# Patient Record
Sex: FEMALE | Race: BLACK OR AFRICAN AMERICAN | NOT HISPANIC OR LATINO | Employment: STUDENT | URBAN - METROPOLITAN AREA
[De-identification: names, ages, dates, MRNs, and addresses within clinical notes are randomized per-mention and may not be internally consistent; named-entity substitution may affect disease eponyms.]

---
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
## 2025-01-09 ENCOUNTER — LAB (OUTPATIENT)
Dept: LAB | Facility: HOSPITAL | Age: 20
End: 2025-01-09
Payer: COMMERCIAL

## 2025-01-09 ENCOUNTER — TRANSCRIBE ORDERS (OUTPATIENT)
Dept: ADMINISTRATIVE | Facility: HOSPITAL | Age: 20
End: 2025-01-09
Payer: COMMERCIAL

## 2025-01-09 DIAGNOSIS — R11.2 NAUSEA AND VOMITING, UNSPECIFIED VOMITING TYPE: Primary | ICD-10-CM

## 2025-01-09 DIAGNOSIS — E55.9 AVITAMINOSIS D: ICD-10-CM

## 2025-01-09 DIAGNOSIS — R11.2 NAUSEA AND VOMITING, UNSPECIFIED VOMITING TYPE: ICD-10-CM

## 2025-01-09 LAB
25(OH)D3 SERPL-MCNC: 43 NG/ML (ref 30–100)
ALBUMIN SERPL-MCNC: 4.9 G/DL (ref 3.5–5.2)
ALBUMIN/GLOB SERPL: 1.5 G/DL
ALP SERPL-CCNC: 83 U/L (ref 39–117)
ALT SERPL W P-5'-P-CCNC: 31 U/L (ref 1–33)
AMYLASE SERPL-CCNC: 85 U/L (ref 28–100)
ANION GAP SERPL CALCULATED.3IONS-SCNC: 13.3 MMOL/L (ref 5–15)
AST SERPL-CCNC: 26 U/L (ref 1–32)
B PARAPERT DNA SPEC QL NAA+PROBE: NOT DETECTED
B PERT DNA SPEC QL NAA+PROBE: NOT DETECTED
BASOPHILS # BLD AUTO: 0.01 10*3/MM3 (ref 0–0.2)
BASOPHILS NFR BLD AUTO: 0.1 % (ref 0–1.5)
BILIRUB SERPL-MCNC: 0.5 MG/DL (ref 0–1.2)
BUN SERPL-MCNC: 7 MG/DL (ref 6–20)
BUN/CREAT SERPL: 10.8 (ref 7–25)
C PNEUM DNA NPH QL NAA+NON-PROBE: NOT DETECTED
CALCIUM SPEC-SCNC: 9.9 MG/DL (ref 8.6–10.5)
CHLORIDE SERPL-SCNC: 103 MMOL/L (ref 98–107)
CO2 SERPL-SCNC: 22.7 MMOL/L (ref 22–29)
CREAT SERPL-MCNC: 0.65 MG/DL (ref 0.57–1)
DEPRECATED RDW RBC AUTO: 38 FL (ref 37–54)
EGFRCR SERPLBLD CKD-EPI 2021: 130.3 ML/MIN/1.73
EOSINOPHIL # BLD AUTO: 0 10*3/MM3 (ref 0–0.4)
EOSINOPHIL NFR BLD AUTO: 0 % (ref 0.3–6.2)
ERYTHROCYTE [DISTWIDTH] IN BLOOD BY AUTOMATED COUNT: 12.4 % (ref 12.3–15.4)
FLUAV SUBTYP SPEC NAA+PROBE: NOT DETECTED
FLUBV RNA ISLT QL NAA+PROBE: NOT DETECTED
GLOBULIN UR ELPH-MCNC: 3.2 GM/DL
GLUCOSE SERPL-MCNC: 107 MG/DL (ref 65–99)
HADV DNA SPEC NAA+PROBE: NOT DETECTED
HCOV 229E RNA SPEC QL NAA+PROBE: NOT DETECTED
HCOV HKU1 RNA SPEC QL NAA+PROBE: NOT DETECTED
HCOV NL63 RNA SPEC QL NAA+PROBE: NOT DETECTED
HCOV OC43 RNA SPEC QL NAA+PROBE: NOT DETECTED
HCT VFR BLD AUTO: 39.7 % (ref 34–46.6)
HGB BLD-MCNC: 13.7 G/DL (ref 12–15.9)
HMPV RNA NPH QL NAA+NON-PROBE: NOT DETECTED
HPIV1 RNA ISLT QL NAA+PROBE: NOT DETECTED
HPIV2 RNA SPEC QL NAA+PROBE: NOT DETECTED
HPIV3 RNA NPH QL NAA+PROBE: NOT DETECTED
HPIV4 P GENE NPH QL NAA+PROBE: NOT DETECTED
IMM GRANULOCYTES # BLD AUTO: 0.04 10*3/MM3 (ref 0–0.05)
IMM GRANULOCYTES NFR BLD AUTO: 0.4 % (ref 0–0.5)
LIPASE SERPL-CCNC: 24 U/L (ref 13–60)
LYMPHOCYTES # BLD AUTO: 0.76 10*3/MM3 (ref 0.7–3.1)
LYMPHOCYTES NFR BLD AUTO: 7.6 % (ref 19.6–45.3)
M PNEUMO IGG SER IA-ACNC: NOT DETECTED
MCH RBC QN AUTO: 29.2 PG (ref 26.6–33)
MCHC RBC AUTO-ENTMCNC: 34.5 G/DL (ref 31.5–35.7)
MCV RBC AUTO: 84.6 FL (ref 79–97)
MONOCYTES # BLD AUTO: 0.11 10*3/MM3 (ref 0.1–0.9)
MONOCYTES NFR BLD AUTO: 1.1 % (ref 5–12)
NEUTROPHILS NFR BLD AUTO: 9.07 10*3/MM3 (ref 1.7–7)
NEUTROPHILS NFR BLD AUTO: 90.8 % (ref 42.7–76)
NRBC BLD AUTO-RTO: 0 /100 WBC (ref 0–0.2)
PLATELET # BLD AUTO: 330 10*3/MM3 (ref 140–450)
PMV BLD AUTO: 10.7 FL (ref 6–12)
POTASSIUM SERPL-SCNC: 3.4 MMOL/L (ref 3.5–5.2)
PROT SERPL-MCNC: 8.1 G/DL (ref 6–8.5)
RBC # BLD AUTO: 4.69 10*6/MM3 (ref 3.77–5.28)
RHINOVIRUS RNA SPEC NAA+PROBE: DETECTED
RSV RNA NPH QL NAA+NON-PROBE: NOT DETECTED
SARS-COV-2 RNA RESP QL NAA+PROBE: NOT DETECTED
SODIUM SERPL-SCNC: 139 MMOL/L (ref 136–145)
WBC NRBC COR # BLD AUTO: 9.99 10*3/MM3 (ref 3.4–10.8)

## 2025-01-09 PROCEDURE — 82150 ASSAY OF AMYLASE: CPT

## 2025-01-09 PROCEDURE — 80053 COMPREHEN METABOLIC PANEL: CPT

## 2025-01-09 PROCEDURE — 85025 COMPLETE CBC W/AUTO DIFF WBC: CPT

## 2025-01-09 PROCEDURE — 87040 BLOOD CULTURE FOR BACTERIA: CPT

## 2025-01-09 PROCEDURE — 36415 COLL VENOUS BLD VENIPUNCTURE: CPT

## 2025-01-09 PROCEDURE — 83690 ASSAY OF LIPASE: CPT

## 2025-01-09 PROCEDURE — 82306 VITAMIN D 25 HYDROXY: CPT

## 2025-01-09 PROCEDURE — 0202U NFCT DS 22 TRGT SARS-COV-2: CPT

## 2025-01-14 LAB
BACTERIA SPEC AEROBE CULT: NORMAL
BACTERIA SPEC AEROBE CULT: NORMAL

## 2025-02-14 ENCOUNTER — HOSPITAL ENCOUNTER (EMERGENCY)
Facility: HOSPITAL | Age: 20
Discharge: HOME | End: 2025-02-14
Attending: EMERGENCY MEDICINE
Payer: COMMERCIAL

## 2025-02-14 ENCOUNTER — APPOINTMENT (OUTPATIENT)
Dept: GENERAL RADIOLOGY | Facility: HOSPITAL | Age: 20
End: 2025-02-14
Payer: COMMERCIAL

## 2025-02-14 VITALS
HEART RATE: 83 BPM | RESPIRATION RATE: 17 BRPM | OXYGEN SATURATION: 99 % | BODY MASS INDEX: 21.96 KG/M2 | TEMPERATURE: 99.7 F | DIASTOLIC BLOOD PRESSURE: 63 MMHG | HEIGHT: 67 IN | SYSTOLIC BLOOD PRESSURE: 105 MMHG

## 2025-02-14 DIAGNOSIS — J45.901 ASTHMA WITH ACUTE EXACERBATION, UNSPECIFIED ASTHMA SEVERITY, UNSPECIFIED WHETHER PERSISTENT: Primary | ICD-10-CM

## 2025-02-14 DIAGNOSIS — R00.2 PALPITATIONS: ICD-10-CM

## 2025-02-14 LAB
ALBUMIN SERPL-MCNC: 4.8 G/DL (ref 3.5–5.2)
ALBUMIN/GLOB SERPL: 1.5 G/DL
ALP SERPL-CCNC: 61 U/L (ref 39–117)
ALT SERPL W P-5'-P-CCNC: 16 U/L (ref 1–33)
ANION GAP SERPL CALCULATED.3IONS-SCNC: 11 MMOL/L (ref 5–15)
AST SERPL-CCNC: 29 U/L (ref 1–32)
BASOPHILS # BLD AUTO: 0.03 10*3/MM3 (ref 0–0.2)
BASOPHILS NFR BLD AUTO: 0.3 % (ref 0–1.5)
BILIRUB SERPL-MCNC: 0.8 MG/DL (ref 0–1.2)
BUN SERPL-MCNC: 9 MG/DL (ref 6–20)
BUN/CREAT SERPL: 13 (ref 7–25)
CALCIUM SPEC-SCNC: 9.6 MG/DL (ref 8.6–10.5)
CHLORIDE SERPL-SCNC: 102 MMOL/L (ref 98–107)
CO2 SERPL-SCNC: 24 MMOL/L (ref 22–29)
CREAT SERPL-MCNC: 0.69 MG/DL (ref 0.57–1)
D DIMER PPP FEU-MCNC: <0.27 MCGFEU/ML (ref 0–0.5)
DEPRECATED RDW RBC AUTO: 37.5 FL (ref 37–54)
EGFRCR SERPLBLD CKD-EPI 2021: 128.4 ML/MIN/1.73
EOSINOPHIL # BLD AUTO: 0.16 10*3/MM3 (ref 0–0.4)
EOSINOPHIL NFR BLD AUTO: 1.7 % (ref 0.3–6.2)
ERYTHROCYTE [DISTWIDTH] IN BLOOD BY AUTOMATED COUNT: 12.3 % (ref 12.3–15.4)
GEN 5 1HR TROPONIN T REFLEX: 8 NG/L
GLOBULIN UR ELPH-MCNC: 3.1 GM/DL
GLUCOSE SERPL-MCNC: 109 MG/DL (ref 65–99)
HCT VFR BLD AUTO: 40.1 % (ref 34–46.6)
HGB BLD-MCNC: 13.7 G/DL (ref 12–15.9)
HOLD SPECIMEN: NORMAL
IMM GRANULOCYTES # BLD AUTO: 0.03 10*3/MM3 (ref 0–0.05)
IMM GRANULOCYTES NFR BLD AUTO: 0.3 % (ref 0–0.5)
LYMPHOCYTES # BLD AUTO: 1.39 10*3/MM3 (ref 0.7–3.1)
LYMPHOCYTES NFR BLD AUTO: 14.4 % (ref 19.6–45.3)
MCH RBC QN AUTO: 28.4 PG (ref 26.6–33)
MCHC RBC AUTO-ENTMCNC: 34.2 G/DL (ref 31.5–35.7)
MCV RBC AUTO: 83.2 FL (ref 79–97)
MONOCYTES # BLD AUTO: 0.61 10*3/MM3 (ref 0.1–0.9)
MONOCYTES NFR BLD AUTO: 6.3 % (ref 5–12)
NEUTROPHILS NFR BLD AUTO: 7.46 10*3/MM3 (ref 1.7–7)
NEUTROPHILS NFR BLD AUTO: 77 % (ref 42.7–76)
NRBC BLD AUTO-RTO: 0 /100 WBC (ref 0–0.2)
PLATELET # BLD AUTO: 307 10*3/MM3 (ref 140–450)
PMV BLD AUTO: 10.6 FL (ref 6–12)
POTASSIUM SERPL-SCNC: 3.6 MMOL/L (ref 3.5–5.2)
PROT SERPL-MCNC: 7.9 G/DL (ref 6–8.5)
RBC # BLD AUTO: 4.82 10*6/MM3 (ref 3.77–5.28)
SODIUM SERPL-SCNC: 137 MMOL/L (ref 136–145)
TROPONIN T NUMERIC DELTA: NORMAL
TROPONIN T SERPL HS-MCNC: <6 NG/L
WBC NRBC COR # BLD AUTO: 9.68 10*3/MM3 (ref 3.4–10.8)

## 2025-02-14 PROCEDURE — 85025 COMPLETE CBC W/AUTO DIFF WBC: CPT | Performed by: EMERGENCY MEDICINE

## 2025-02-14 PROCEDURE — 71045 X-RAY EXAM CHEST 1 VIEW: CPT

## 2025-02-14 PROCEDURE — 99284 EMERGENCY DEPT VISIT MOD MDM: CPT

## 2025-02-14 PROCEDURE — 80053 COMPREHEN METABOLIC PANEL: CPT | Performed by: EMERGENCY MEDICINE

## 2025-02-14 PROCEDURE — 85379 FIBRIN DEGRADATION QUANT: CPT | Performed by: EMERGENCY MEDICINE

## 2025-02-14 PROCEDURE — 36415 COLL VENOUS BLD VENIPUNCTURE: CPT

## 2025-02-14 PROCEDURE — 84484 ASSAY OF TROPONIN QUANT: CPT | Performed by: EMERGENCY MEDICINE

## 2025-02-14 PROCEDURE — 93005 ELECTROCARDIOGRAM TRACING: CPT | Performed by: EMERGENCY MEDICINE

## 2025-02-14 RX ORDER — SODIUM CHLORIDE 0.9 % (FLUSH) 0.9 %
10 SYRINGE (ML) INJECTION AS NEEDED
Status: DISCONTINUED | OUTPATIENT
Start: 2025-02-14 | End: 2025-02-14 | Stop reason: HOSPADM

## 2025-02-14 NOTE — ED PROVIDER NOTES
Subjective   History of Present Illness  Patient is a 20y/o female with a history of chronic asthma who presents with a flare-up experienced this morning. While at work, she became lightheaded and noted her heart rate was 150 beats per minute and her oxygen saturation was 87%. She was moved to a different room and experienced a 20-minute coughing fit, during which she felt like she was choking. Subsequently, the right side of her head started tingling, and she had difficulty catching her breath. She used her rescue inhaler, which initially helped, but during a second flare-up, she experienced burning in the lower part of her chest, was hunched over, and sweating. She reports feeling out of breath and having episodes of tachycardia. She states she is seeing a specialist as she has been experiencing breathlessness and tachycardia for a period of 2-6 weeks.    Past Medical History: chronic asthma.    Medications: Rescue inhaler.  Review of Systems  See hpi  Past Medical History:   Diagnosis Date    Allergies     Asthma        Not on File    Past Surgical History:   Procedure Laterality Date    LEG TENDON REPAIR Right 01/24/2022    Procedure: TENDON REPAIR FOOT/TOE -posterior tibial tendon repair, Tarsal Tunnel decompression, biopsy of soft tissue mass;  Surgeon: DAMION Hammer DPM;  Location: [facility];  Service: Podiatry;  Laterality: Right;    LYMPH NODE BIOPSY Right     neck       Family History   Problem Relation Age of Onset    No Known Problems Mother     No Known Problems Father     Diabetes Maternal Grandmother     Cancer Maternal Grandmother     Hypertension Maternal Grandmother     Heart disease Maternal Grandfather     Heart attack Maternal Grandfather     Hypertension Maternal Grandfather        Social History     Socioeconomic History    Marital status: Single   Tobacco Use    Smoking status: Never    Smokeless tobacco: Never   Vaping Use    Vaping status: Never Used   Substance and Sexual Activity  "   Alcohol use: Never    Drug use: Never    Sexual activity: Not Currently           Objective   Physical Exam  No acute distress. Normocephalic. No scleral icterus. Moist oral mucosa. No observable neck masses on external visualization. Slight bilateral expiratory wheezes. Tachycardic rate and irregular rhythm. No lower extremity edema. No murmur appreciated. Intact distal pulses. Abdomen soft and nontender without peritoneal signs. Alert. Normal Speech.  Procedures           ED Course      /63   Pulse 83   Temp 99.7 °F (37.6 °C)   Resp 17   Ht 170.2 cm (67\")   SpO2 99%   BMI 21.96 kg/m²   Labs Reviewed   COMPREHENSIVE METABOLIC PANEL - Abnormal; Notable for the following components:       Result Value    Glucose 109 (*)     All other components within normal limits    Narrative:     GFR Categories in Chronic Kidney Disease (CKD)      GFR Category          GFR (mL/min/1.73)    Interpretation  G1                     90 or greater         Normal or high (1)  G2                      60-89                Mild decrease (1)  G3a                   45-59                Mild to moderate decrease  G3b                   30-44                Moderate to severe decrease  G4                    15-29                Severe decrease  G5                    14 or less           Kidney failure          (1)In the absence of evidence of kidney disease, neither GFR category G1 or G2 fulfill the criteria for CKD.    eGFR calculation 2021 CKD-EPI creatinine equation, which does not include race as a factor   CBC WITH AUTO DIFFERENTIAL - Abnormal; Notable for the following components:    Neutrophil % 77.0 (*)     Lymphocyte % 14.4 (*)     Neutrophils, Absolute 7.46 (*)     All other components within normal limits   TROPONIN - Normal    Narrative:     High Sensitive Troponin T Reference Range:  <14.0 ng/L- Negative Female for AMI  <22.0 ng/L- Negative Male for AMI  >=14 - Abnormal Female indicating possible myocardial " "injury.  >=22 - Abnormal Male indicating possible myocardial injury.   Clinicians would have to utilize clinical acumen, EKG, Troponin, and serial changes to determine if it is an Acute Myocardial Infarction or myocardial injury due to an underlying chronic condition.        D-DIMER, QUANTITATIVE - Normal    Narrative:     According to the assay 's published package insert, a normal (<0.50 MCGFEU/mL) D-dimer result in conjunction with a non-high clinical probability assessment, excludes deep vein thrombosis (DVT) and pulmonary embolism (PE) with high sensitivity.    D-dimer values increase with age and this can make VTE exclusion of an older population difficult. To address this, the [employer], based on best available evidence and recent guidelines, recommends that clinicians use age-adjusted D-dimer thresholds in patients greater than 50 years of age with: a) a low probability of PE who do not meet all Pulmonary Embolism Rule Out Criteria, or b) in those with intermediate probability of PE.   The formula for an age-adjusted D-dimer cut-off is \"age/100\".  For example, a 60 year old patient would have an age-adjusted cut-off of 0.60 MCGFEU/mL and an 80 year old 0.80 MCGFEU/mL.   HIGH SENSITIVITIY TROPONIN T 1HR    Narrative:     High Sensitive Troponin T Reference Range:  <14.0 ng/L- Negative Female for AMI  <22.0 ng/L- Negative Male for AMI  >=14 - Abnormal Female indicating possible myocardial injury.  >=22 - Abnormal Male indicating possible myocardial injury.   Clinicians would have to utilize clinical acumen, EKG, Troponin, and serial changes to determine if it is an Acute Myocardial Infarction or myocardial injury due to an underlying chronic condition.        CBC AND DIFFERENTIAL    Narrative:     The following orders were created for panel order CBC & Differential.  Procedure                               Abnormality         Status                     ---------                 "               -----------         ------                     CBC Auto Differential[[id number]]        Abnormal            Final result                 Please view results for these tests on the individual orders.   EXTRA TUBES    Narrative:     The following orders were created for panel order Extra Tubes.  Procedure                               Abnormality         Status                     ---------                               -----------         ------                     Gold Top - SST[[id number]]                                   Final result                 Please view results for these tests on the individual orders.   GOLD TOP - SST     Medications - No data to display  XR Chest 1 View   Final Result   Impression:   No acute cardiopulmonary findings.         Electronically Signed: Shirin Murray MD     2/14/2025 1:55 PM EST     Workstation ID: [id number]                                                         Medical Decision Making  Problems Addressed:  Asthma with acute exacerbation, unspecified asthma severity, unspecified whether persistent: complicated acute illness or injury  Palpitations: complicated acute illness or injury    Amount and/or Complexity of Data Reviewed  Labs: ordered.  Radiology: ordered.  ECG/medicine tests: ordered.    Risk  Prescription drug management.      EKG interpretation: 1335, rate 94, normal sinus rhythm, normal axis, normals, no acute ischemic changes.    Interpretation chest x-ray is no focal infiltrate or pneumothorax.  See system for radiology interpretation.    Troponin negative.  Dimer negative.  Reassuring labs and vital signs.  Patient nontoxic-appearing.  Wheezing resolved after breathing treatment.  Will DC.  Return ER precautions discussed.  Final diagnoses:   Asthma with acute exacerbation, unspecified asthma severity, unspecified whether persistent   Palpitations       ED Disposition  ED Disposition       ED Disposition   Discharge    Condition   Stable     Comment   --               Lola Handley, BELLA  [address] [place] [address]  [phone]    In 3 days           Medication List      No changes were made to your prescriptions during this visit.            Jarvis Deal MD  02/15/25 0000

## 2025-02-15 LAB
QT INTERVAL: 339 MS
QTC INTERVAL: 424 MS

## 2025-02-20 ENCOUNTER — HOSPITAL ENCOUNTER (EMERGENCY)
Facility: HOSPITAL | Age: 20
Discharge: HOME | End: 2025-02-20
Attending: EMERGENCY MEDICINE
Payer: COMMERCIAL

## 2025-02-20 ENCOUNTER — APPOINTMENT (OUTPATIENT)
Dept: GENERAL RADIOLOGY | Facility: HOSPITAL | Age: 20
End: 2025-02-20
Payer: COMMERCIAL

## 2025-02-20 VITALS
TEMPERATURE: 98.3 F | BODY MASS INDEX: 22.15 KG/M2 | HEIGHT: 67 IN | RESPIRATION RATE: 18 BRPM | OXYGEN SATURATION: 99 % | WEIGHT: 141.09 LBS | HEART RATE: 107 BPM | DIASTOLIC BLOOD PRESSURE: 69 MMHG | SYSTOLIC BLOOD PRESSURE: 105 MMHG

## 2025-02-20 DIAGNOSIS — J45.901 EXACERBATION OF ASTHMA, UNSPECIFIED ASTHMA SEVERITY, UNSPECIFIED WHETHER PERSISTENT: Primary | ICD-10-CM

## 2025-02-20 LAB
ANION GAP SERPL CALCULATED.3IONS-SCNC: 10.1 MMOL/L (ref 5–15)
B PARAPERT DNA SPEC QL NAA+PROBE: NOT DETECTED
B PERT DNA SPEC QL NAA+PROBE: NOT DETECTED
BASOPHILS # BLD AUTO: 0.05 10*3/MM3 (ref 0–0.2)
BASOPHILS NFR BLD AUTO: 0.5 % (ref 0–1.5)
BUN SERPL-MCNC: 6 MG/DL (ref 6–20)
BUN/CREAT SERPL: 9.8 (ref 7–25)
C PNEUM DNA NPH QL NAA+NON-PROBE: NOT DETECTED
CALCIUM SPEC-SCNC: 9.9 MG/DL (ref 8.6–10.5)
CHLORIDE SERPL-SCNC: 104 MMOL/L (ref 98–107)
CO2 SERPL-SCNC: 22.9 MMOL/L (ref 22–29)
CREAT SERPL-MCNC: 0.61 MG/DL (ref 0.57–1)
DEPRECATED RDW RBC AUTO: 37.3 FL (ref 37–54)
EGFRCR SERPLBLD CKD-EPI 2021: 132.3 ML/MIN/1.73
EOSINOPHIL # BLD AUTO: 0.05 10*3/MM3 (ref 0–0.4)
EOSINOPHIL NFR BLD AUTO: 0.5 % (ref 0.3–6.2)
ERYTHROCYTE [DISTWIDTH] IN BLOOD BY AUTOMATED COUNT: 12.5 % (ref 12.3–15.4)
FLUAV SUBTYP SPEC NAA+PROBE: NOT DETECTED
FLUBV RNA ISLT QL NAA+PROBE: NOT DETECTED
GLUCOSE SERPL-MCNC: 113 MG/DL (ref 65–99)
HADV DNA SPEC NAA+PROBE: NOT DETECTED
HCOV 229E RNA SPEC QL NAA+PROBE: NOT DETECTED
HCOV HKU1 RNA SPEC QL NAA+PROBE: NOT DETECTED
HCOV NL63 RNA SPEC QL NAA+PROBE: NOT DETECTED
HCOV OC43 RNA SPEC QL NAA+PROBE: NOT DETECTED
HCT VFR BLD AUTO: 37.5 % (ref 34–46.6)
HGB BLD-MCNC: 12.8 G/DL (ref 12–15.9)
HMPV RNA NPH QL NAA+NON-PROBE: NOT DETECTED
HOLD SPECIMEN: NORMAL
HPIV1 RNA ISLT QL NAA+PROBE: NOT DETECTED
HPIV2 RNA SPEC QL NAA+PROBE: NOT DETECTED
HPIV3 RNA NPH QL NAA+PROBE: NOT DETECTED
HPIV4 P GENE NPH QL NAA+PROBE: NOT DETECTED
IMM GRANULOCYTES # BLD AUTO: 0.04 10*3/MM3 (ref 0–0.05)
IMM GRANULOCYTES NFR BLD AUTO: 0.4 % (ref 0–0.5)
LYMPHOCYTES # BLD AUTO: 1.19 10*3/MM3 (ref 0.7–3.1)
LYMPHOCYTES NFR BLD AUTO: 12.7 % (ref 19.6–45.3)
M PNEUMO IGG SER IA-ACNC: NOT DETECTED
MCH RBC QN AUTO: 28.1 PG (ref 26.6–33)
MCHC RBC AUTO-ENTMCNC: 34.1 G/DL (ref 31.5–35.7)
MCV RBC AUTO: 82.2 FL (ref 79–97)
MONOCYTES # BLD AUTO: 0.29 10*3/MM3 (ref 0.1–0.9)
MONOCYTES NFR BLD AUTO: 3.1 % (ref 5–12)
NEUTROPHILS NFR BLD AUTO: 7.75 10*3/MM3 (ref 1.7–7)
NEUTROPHILS NFR BLD AUTO: 82.8 % (ref 42.7–76)
NRBC BLD AUTO-RTO: 0 /100 WBC (ref 0–0.2)
PLATELET # BLD AUTO: 314 10*3/MM3 (ref 140–450)
PMV BLD AUTO: 10.5 FL (ref 6–12)
POTASSIUM SERPL-SCNC: 3.6 MMOL/L (ref 3.5–5.2)
RBC # BLD AUTO: 4.56 10*6/MM3 (ref 3.77–5.28)
RHINOVIRUS RNA SPEC NAA+PROBE: NOT DETECTED
RSV RNA NPH QL NAA+NON-PROBE: NOT DETECTED
SARS-COV-2 RNA RESP QL NAA+PROBE: NOT DETECTED
SODIUM SERPL-SCNC: 137 MMOL/L (ref 136–145)
WBC NRBC COR # BLD AUTO: 9.37 10*3/MM3 (ref 3.4–10.8)
WHOLE BLOOD HOLD COAG: NORMAL

## 2025-02-20 PROCEDURE — 80048 BASIC METABOLIC PNL TOTAL CA: CPT | Performed by: EMERGENCY MEDICINE

## 2025-02-20 PROCEDURE — 94799 UNLISTED PULMONARY SVC/PX: CPT

## 2025-02-20 PROCEDURE — 94640 AIRWAY INHALATION TREATMENT: CPT

## 2025-02-20 PROCEDURE — 71045 X-RAY EXAM CHEST 1 VIEW: CPT

## 2025-02-20 PROCEDURE — 96366 THER/PROPH/DIAG IV INF ADDON: CPT

## 2025-02-20 PROCEDURE — 99284 EMERGENCY DEPT VISIT MOD MDM: CPT

## 2025-02-20 PROCEDURE — 85025 COMPLETE CBC W/AUTO DIFF WBC: CPT | Performed by: EMERGENCY MEDICINE

## 2025-02-20 PROCEDURE — 96365 THER/PROPH/DIAG IV INF INIT: CPT

## 2025-02-20 PROCEDURE — 25010000002 MAGNESIUM SULFATE 2 GM/50ML SOLUTION: Performed by: EMERGENCY MEDICINE

## 2025-02-20 PROCEDURE — 0202U NFCT DS 22 TRGT SARS-COV-2: CPT | Performed by: EMERGENCY MEDICINE

## 2025-02-20 PROCEDURE — 93005 ELECTROCARDIOGRAM TRACING: CPT | Performed by: EMERGENCY MEDICINE

## 2025-02-20 PROCEDURE — 94761 N-INVAS EAR/PLS OXIMETRY MLT: CPT

## 2025-02-20 RX ORDER — ALBUTEROL SULFATE 0.83 MG/ML
2.5 SOLUTION RESPIRATORY (INHALATION) ONCE
Status: COMPLETED | OUTPATIENT
Start: 2025-02-20 | End: 2025-02-20

## 2025-02-20 RX ORDER — SODIUM CHLORIDE 0.9 % (FLUSH) 0.9 %
10 SYRINGE (ML) INJECTION AS NEEDED
Status: DISCONTINUED | OUTPATIENT
Start: 2025-02-20 | End: 2025-02-20 | Stop reason: HOSPADM

## 2025-02-20 RX ORDER — MAGNESIUM SULFATE HEPTAHYDRATE 40 MG/ML
2 INJECTION, SOLUTION INTRAVENOUS ONCE
Status: COMPLETED | OUTPATIENT
Start: 2025-02-20 | End: 2025-02-20

## 2025-02-20 RX ADMIN — ALBUTEROL SULFATE 2.5 MG: 2.5 SOLUTION RESPIRATORY (INHALATION) at 17:12

## 2025-02-20 RX ADMIN — MAGNESIUM SULFATE IN WATER FOR 2 G: 40 INJECTION INTRAVENOUS at 16:43

## 2025-02-20 NOTE — ED PROVIDER NOTES
Subjective   History of Present Illness  Chief complaint: Shortness of breath    19-year-old female with history of asthma presents with shortness of breath.  Patient states symptoms have been present over the past 1 to 2 weeks.  She was at her primary doctor's office today and could not catch her breath.  She was given Solu-Medrol and a DuoNeb treatment in the office.  EMS also gave an albuterol treatment en route to the hospital.  She is oxygenating well on 2 L but still feels short of breath and is having cough and bronchospasm.  She denies fever.  She states she has been sweating and has been tachycardic.    History provided by:  Patient      Review of Systems   Constitutional:  Positive for diaphoresis. Negative for fever.   HENT:  Negative for congestion.    Respiratory:  Positive for cough and shortness of breath.    Cardiovascular:  Negative for chest pain.   Gastrointestinal:  Negative for abdominal pain and vomiting.   Neurological:  Negative for headaches.       Past Medical History:   Diagnosis Date    Allergies     Asthma        No Known Allergies    Past Surgical History:   Procedure Laterality Date    LEG TENDON REPAIR Right 01/24/2022    Procedure: TENDON REPAIR FOOT/TOE -posterior tibial tendon repair, Tarsal Tunnel decompression, biopsy of soft tissue mass;  Surgeon: DAMION Hammer DPM;  Location: [facility] OR;  Service: Podiatry;  Laterality: Right;    LYMPH NODE BIOPSY Right     neck       Family History   Problem Relation Age of Onset    No Known Problems Mother     No Known Problems Father     Diabetes Maternal Grandmother     Cancer Maternal Grandmother     Hypertension Maternal Grandmother     Heart disease Maternal Grandfather     Heart attack Maternal Grandfather     Hypertension Maternal Grandfather        Social History     Socioeconomic History    Marital status: Single   Tobacco Use    Smoking status: Never    Smokeless tobacco: Never   Vaping Use    Vaping status: Never Used  "  Substance and Sexual Activity    Alcohol use: Never    Drug use: Never    Sexual activity: Not Currently       /68   Pulse 101   Temp 98.3 °F (36.8 °C) (Oral)   Resp 18   Ht 170 cm (66.93\")   Wt 64 kg (141 lb 1.5 oz)   SpO2 96%   BMI 22.15 kg/m²       Objective   Physical Exam  Vitals and nursing note reviewed.   Constitutional:       Appearance: Normal appearance.   HENT:      Head: Normocephalic and atraumatic.      Mouth/Throat:      Mouth: Mucous membranes are moist.   Cardiovascular:      Rate and Rhythm: Regular rhythm. Tachycardia present.      Heart sounds: Normal heart sounds.   Pulmonary:      Effort: Accessory muscle usage present.      Breath sounds: Decreased breath sounds and wheezing present.   Abdominal:      Palpations: Abdomen is soft.      Tenderness: There is no abdominal tenderness.   Musculoskeletal:      Right lower leg: No edema.      Left lower leg: No edema.   Skin:     General: Skin is warm and dry.   Neurological:      Mental Status: She is alert and oriented to person, place, and time.         Procedures           ED Course      My interpretation of EKG shows sinus rhythm, rate of 95, no ST elevation, artifact present                                     Results for orders placed or performed during the hospital encounter of 02/20/25   ECG 12 Lead Dyspnea    Collection Time: 02/20/25  4:38 PM   Result Value Ref Range    QT Interval 343 ms    QTC Interval 432 ms   Respiratory Panel PCR w/COVID-19(SARS-CoV-2) SANTOSH/YUSEF/NEGRO/PAD/COR/GERDA In-House, NP Swab in [facility], 2 HR TAT - Swab, Nasopharynx    Collection Time: 02/20/25  4:40 PM    Specimen: Nasopharynx; Swab   Result Value Ref Range    ADENOVIRUS, PCR Not Detected Not Detected    Coronavirus 229E Not Detected Not Detected    Coronavirus HKU1 Not Detected Not Detected    Coronavirus NL63 Not Detected Not Detected    Coronavirus OC43 Not Detected Not Detected    COVID19 Not Detected Not Detected - Ref. Range    Human " Metapneumovirus Not Detected Not Detected    Human Rhinovirus/Enterovirus Not Detected Not Detected    Influenza A PCR Not Detected Not Detected    Influenza B PCR Not Detected Not Detected    Parainfluenza Virus 1 Not Detected Not Detected    Parainfluenza Virus 2 Not Detected Not Detected    Parainfluenza Virus 3 Not Detected Not Detected    Parainfluenza Virus 4 Not Detected Not Detected    RSV, PCR Not Detected Not Detected    Bordetella pertussis pcr Not Detected Not Detected    Bordetella parapertussis PCR Not Detected Not Detected    Chlamydophila pneumoniae PCR Not Detected Not Detected    Mycoplasma pneumo by PCR Not Detected Not Detected   Basic Metabolic Panel    Collection Time: 02/20/25  4:45 PM    Specimen: Blood   Result Value Ref Range    Glucose 113 (H) 65 - 99 mg/dL    BUN 6 6 - 20 mg/dL    Creatinine 0.61 0.57 - 1.00 mg/dL    Sodium 137 136 - 145 mmol/L    Potassium 3.6 3.5 - 5.2 mmol/L    Chloride 104 98 - 107 mmol/L    CO2 22.9 22.0 - 29.0 mmol/L    Calcium 9.9 8.6 - 10.5 mg/dL    BUN/Creatinine Ratio 9.8 7.0 - 25.0    Anion Gap 10.1 5.0 - 15.0 mmol/L    eGFR 132.3 >60.0 mL/min/1.73   CBC Auto Differential    Collection Time: 02/20/25  4:45 PM    Specimen: Blood   Result Value Ref Range    WBC 9.37 3.40 - 10.80 10*3/mm3    RBC 4.56 3.77 - 5.28 10*6/mm3    Hemoglobin 12.8 12.0 - 15.9 g/dL    Hematocrit 37.5 34.0 - 46.6 %    MCV 82.2 79.0 - 97.0 fL    MCH 28.1 26.6 - 33.0 pg    MCHC 34.1 31.5 - 35.7 g/dL    RDW 12.5 12.3 - 15.4 %    RDW-SD 37.3 37.0 - 54.0 fl    MPV 10.5 6.0 - 12.0 fL    Platelets 314 140 - 450 10*3/mm3    Neutrophil % 82.8 (H) 42.7 - 76.0 %    Lymphocyte % 12.7 (L) 19.6 - 45.3 %    Monocyte % 3.1 (L) 5.0 - 12.0 %    Eosinophil % 0.5 0.3 - 6.2 %    Basophil % 0.5 0.0 - 1.5 %    Immature Grans % 0.4 0.0 - 0.5 %    Neutrophils, Absolute 7.75 (H) 1.70 - 7.00 10*3/mm3    Lymphocytes, Absolute 1.19 0.70 - 3.10 10*3/mm3    Monocytes, Absolute 0.29 0.10 - 0.90 10*3/mm3    Eosinophils,  Absolute 0.05 0.00 - 0.40 10*3/mm3    Basophils, Absolute 0.05 0.00 - 0.20 10*3/mm3    Immature Grans, Absolute 0.04 0.00 - 0.05 10*3/mm3    nRBC 0.0 0.0 - 0.2 /100 WBC   Gold Top - SST    Collection Time: 02/20/25  4:45 PM   Result Value Ref Range    Extra Tube Hold for add-ons.    Light Blue Top    Collection Time: 02/20/25  4:45 PM   Result Value Ref Range    Extra Tube Hold for add-ons.      XR Chest 1 View    Result Date: 2/20/2025  Impression: No radiographic evidence of acute cardiopulmonary abnormality. Electronically Signed: Hugo Rivas  2/20/2025 5:12 PM EST  Workstation ID: [id number]                 Medical Decision Making  Amount and/or Complexity of Data Reviewed  Labs: ordered.  Radiology: ordered.  ECG/medicine tests: ordered.    Risk  Prescription drug management.      Patient had the above evaluation.  Results were discussed with the patient.  My interpretation of chest x-ray shows no infiltrate or effusion.  EKG shows no acute ischemia.  White blood cell count is normal.  BMP is unremarkable.  Respiratory panel is negative.  Patient was given breathing treatments and magnesium in the emergency room.  She is feeling much better.  Her cough is resolved.  She no longer has any wheezing on exam.  She is oxygenating well on room air.  She will be discharged to follow-up with her primary provider and pulmonologist.      Final diagnoses:   Exacerbation of asthma, unspecified asthma severity, unspecified whether persistent       ED Disposition  ED Disposition       ED Disposition   Discharge    Condition   Stable    Comment   --               Lola Handley NP  [address] [facility] [place] [facility]  [phone]    Call in 2 days           Medication List      No changes were made to your prescriptions during this visit.            Chidi Brothers MD  02/20/25 [id number]

## 2025-02-20 NOTE — DISCHARGE INSTRUCTIONS
Follow-up with your primary provider and pulmonologist.  Return to the emergency room for any new or worsening symptoms or if you have any other questions or concerns.

## 2025-02-21 LAB
QT INTERVAL: 343 MS
QTC INTERVAL: 432 MS

## 2025-03-24 ENCOUNTER — APPOINTMENT (OUTPATIENT)
Dept: GENERAL RADIOLOGY | Facility: HOSPITAL | Age: 20
End: 2025-03-24
Payer: COMMERCIAL

## 2025-03-24 ENCOUNTER — HOSPITAL ENCOUNTER (EMERGENCY)
Facility: HOSPITAL | Age: 20
Discharge: HOME | End: 2025-03-24
Attending: EMERGENCY MEDICINE | Admitting: EMERGENCY MEDICINE
Payer: COMMERCIAL

## 2025-03-24 VITALS
SYSTOLIC BLOOD PRESSURE: 101 MMHG | DIASTOLIC BLOOD PRESSURE: 65 MMHG | OXYGEN SATURATION: 100 % | TEMPERATURE: 98.4 F | RESPIRATION RATE: 16 BRPM | WEIGHT: 150.79 LBS | HEIGHT: 67 IN | HEART RATE: 85 BPM | BODY MASS INDEX: 23.67 KG/M2

## 2025-03-24 DIAGNOSIS — R11.10 VOMITING, UNSPECIFIED VOMITING TYPE, UNSPECIFIED WHETHER NAUSEA PRESENT: Primary | ICD-10-CM

## 2025-03-24 LAB
ALBUMIN SERPL-MCNC: 4.4 G/DL (ref 3.5–5.2)
ALBUMIN/GLOB SERPL: 1.8 G/DL
ALP SERPL-CCNC: 78 U/L (ref 39–117)
ALT SERPL W P-5'-P-CCNC: 82 U/L (ref 1–33)
ANION GAP SERPL CALCULATED.3IONS-SCNC: 11.1 MMOL/L (ref 5–15)
AST SERPL-CCNC: 48 U/L (ref 1–32)
B PARAPERT DNA SPEC QL NAA+PROBE: NOT DETECTED
B PERT DNA SPEC QL NAA+PROBE: NOT DETECTED
B-HCG UR QL: NEGATIVE
BASOPHILS # BLD AUTO: 0.03 10*3/MM3 (ref 0–0.2)
BASOPHILS NFR BLD AUTO: 0.4 % (ref 0–1.5)
BILIRUB SERPL-MCNC: 0.3 MG/DL (ref 0–1.2)
BILIRUB UR QL STRIP: NEGATIVE
BUN SERPL-MCNC: 5 MG/DL (ref 6–20)
BUN/CREAT SERPL: 7.8 (ref 7–25)
C PNEUM DNA NPH QL NAA+NON-PROBE: NOT DETECTED
CALCIUM SPEC-SCNC: 8.7 MG/DL (ref 8.6–10.5)
CHLORIDE SERPL-SCNC: 106 MMOL/L (ref 98–107)
CLARITY UR: CLEAR
CO2 SERPL-SCNC: 21.9 MMOL/L (ref 22–29)
COLOR UR: YELLOW
CREAT SERPL-MCNC: 0.64 MG/DL (ref 0.57–1)
DEPRECATED RDW RBC AUTO: 39.2 FL (ref 37–54)
EGFRCR SERPLBLD CKD-EPI 2021: 129.9 ML/MIN/1.73
EOSINOPHIL # BLD AUTO: 0.12 10*3/MM3 (ref 0–0.4)
EOSINOPHIL NFR BLD AUTO: 1.6 % (ref 0.3–6.2)
ERYTHROCYTE [DISTWIDTH] IN BLOOD BY AUTOMATED COUNT: 12.7 % (ref 12.3–15.4)
FLUAV SUBTYP SPEC NAA+PROBE: NOT DETECTED
FLUBV RNA ISLT QL NAA+PROBE: NOT DETECTED
GLOBULIN UR ELPH-MCNC: 2.5 GM/DL
GLUCOSE SERPL-MCNC: 88 MG/DL (ref 65–99)
GLUCOSE UR STRIP-MCNC: NEGATIVE MG/DL
HADV DNA SPEC NAA+PROBE: NOT DETECTED
HCOV 229E RNA SPEC QL NAA+PROBE: NOT DETECTED
HCOV HKU1 RNA SPEC QL NAA+PROBE: NOT DETECTED
HCOV NL63 RNA SPEC QL NAA+PROBE: NOT DETECTED
HCOV OC43 RNA SPEC QL NAA+PROBE: NOT DETECTED
HCT VFR BLD AUTO: 38 % (ref 34–46.6)
HGB BLD-MCNC: 12.6 G/DL (ref 12–15.9)
HGB UR QL STRIP.AUTO: NEGATIVE
HMPV RNA NPH QL NAA+NON-PROBE: NOT DETECTED
HPIV1 RNA ISLT QL NAA+PROBE: NOT DETECTED
HPIV2 RNA SPEC QL NAA+PROBE: NOT DETECTED
HPIV3 RNA NPH QL NAA+PROBE: NOT DETECTED
HPIV4 P GENE NPH QL NAA+PROBE: NOT DETECTED
IMM GRANULOCYTES # BLD AUTO: 0.03 10*3/MM3 (ref 0–0.05)
IMM GRANULOCYTES NFR BLD AUTO: 0.4 % (ref 0–0.5)
KETONES UR QL STRIP: NEGATIVE
LEUKOCYTE ESTERASE UR QL STRIP.AUTO: NEGATIVE
LIPASE SERPL-CCNC: 22 U/L (ref 13–60)
LYMPHOCYTES # BLD AUTO: 2.24 10*3/MM3 (ref 0.7–3.1)
LYMPHOCYTES NFR BLD AUTO: 29.7 % (ref 19.6–45.3)
M PNEUMO IGG SER IA-ACNC: NOT DETECTED
MCH RBC QN AUTO: 28.3 PG (ref 26.6–33)
MCHC RBC AUTO-ENTMCNC: 33.2 G/DL (ref 31.5–35.7)
MCV RBC AUTO: 85.2 FL (ref 79–97)
MONOCYTES # BLD AUTO: 0.56 10*3/MM3 (ref 0.1–0.9)
MONOCYTES NFR BLD AUTO: 7.4 % (ref 5–12)
NEUTROPHILS NFR BLD AUTO: 4.57 10*3/MM3 (ref 1.7–7)
NEUTROPHILS NFR BLD AUTO: 60.5 % (ref 42.7–76)
NITRITE UR QL STRIP: NEGATIVE
NRBC BLD AUTO-RTO: 0 /100 WBC (ref 0–0.2)
PH UR STRIP.AUTO: 7.5 [PH] (ref 5–8)
PLATELET # BLD AUTO: 286 10*3/MM3 (ref 140–450)
PMV BLD AUTO: 10.2 FL (ref 6–12)
POTASSIUM SERPL-SCNC: 3.7 MMOL/L (ref 3.5–5.2)
PROT SERPL-MCNC: 6.9 G/DL (ref 6–8.5)
PROT UR QL STRIP: NEGATIVE
RBC # BLD AUTO: 4.46 10*6/MM3 (ref 3.77–5.28)
RHINOVIRUS RNA SPEC NAA+PROBE: NOT DETECTED
RSV RNA NPH QL NAA+NON-PROBE: NOT DETECTED
SARS-COV-2 RNA RESP QL NAA+PROBE: NOT DETECTED
SODIUM SERPL-SCNC: 139 MMOL/L (ref 136–145)
SP GR UR STRIP: 1.01 (ref 1–1.03)
UROBILINOGEN UR QL STRIP: NORMAL
WBC NRBC COR # BLD AUTO: 7.55 10*3/MM3 (ref 3.4–10.8)

## 2025-03-24 PROCEDURE — 99284 EMERGENCY DEPT VISIT MOD MDM: CPT

## 2025-03-24 PROCEDURE — 85025 COMPLETE CBC W/AUTO DIFF WBC: CPT | Performed by: EMERGENCY MEDICINE

## 2025-03-24 PROCEDURE — 93005 ELECTROCARDIOGRAM TRACING: CPT | Performed by: EMERGENCY MEDICINE

## 2025-03-24 PROCEDURE — 83690 ASSAY OF LIPASE: CPT | Performed by: EMERGENCY MEDICINE

## 2025-03-24 PROCEDURE — 25010000002 ONDANSETRON PER 1 MG: Performed by: EMERGENCY MEDICINE

## 2025-03-24 PROCEDURE — 81025 URINE PREGNANCY TEST: CPT | Performed by: EMERGENCY MEDICINE

## 2025-03-24 PROCEDURE — 96374 THER/PROPH/DIAG INJ IV PUSH: CPT

## 2025-03-24 PROCEDURE — 81003 URINALYSIS AUTO W/O SCOPE: CPT | Performed by: EMERGENCY MEDICINE

## 2025-03-24 PROCEDURE — 71045 X-RAY EXAM CHEST 1 VIEW: CPT

## 2025-03-24 PROCEDURE — 25810000003 SODIUM CHLORIDE 0.9 % SOLUTION: Performed by: EMERGENCY MEDICINE

## 2025-03-24 PROCEDURE — 0202U NFCT DS 22 TRGT SARS-COV-2: CPT | Performed by: EMERGENCY MEDICINE

## 2025-03-24 PROCEDURE — 80053 COMPREHEN METABOLIC PANEL: CPT | Performed by: EMERGENCY MEDICINE

## 2025-03-24 RX ORDER — ONDANSETRON 2 MG/ML
4 INJECTION INTRAMUSCULAR; INTRAVENOUS ONCE
Status: COMPLETED | OUTPATIENT
Start: 2025-03-24 | End: 2025-03-24

## 2025-03-24 RX ORDER — SODIUM CHLORIDE 0.9 % (FLUSH) 0.9 %
10 SYRINGE (ML) INJECTION AS NEEDED
Status: DISCONTINUED | OUTPATIENT
Start: 2025-03-24 | End: 2025-03-24 | Stop reason: HOSPADM

## 2025-03-24 RX ORDER — OMEPRAZOLE 40 MG/1
40 CAPSULE, DELAYED RELEASE ORAL DAILY
Qty: 30 CAPSULE | Refills: 0 | Status: SHIPPED | OUTPATIENT
Start: 2025-03-24

## 2025-03-24 RX ORDER — ONDANSETRON 4 MG/1
4 TABLET, ORALLY DISINTEGRATING ORAL EVERY 8 HOURS PRN
Qty: 10 TABLET | Refills: 0 | Status: SHIPPED | OUTPATIENT
Start: 2025-03-24

## 2025-03-24 RX ADMIN — ONDANSETRON 4 MG: 2 INJECTION, SOLUTION INTRAMUSCULAR; INTRAVENOUS at 13:43

## 2025-03-24 RX ADMIN — SODIUM CHLORIDE 1000 ML: 9 INJECTION, SOLUTION INTRAVENOUS at 13:20

## 2025-03-24 NOTE — ED PROVIDER NOTES
Subjective   History of Present Illness  Chief complaint: Vomiting    20-year-old female presents with cough and vomiting.  She has a history of asthma.  She states she was recently started on DuoNeb and Pulmicort for her asthma.  She states she has been getting nauseous after doing the nebulizer treatments.  She has had a lot of vomiting.  She does report some diarrhea as well.  She states she has occasionally noticed small bits of blood in her vomit.  She states she saw her pulmonologist who recommended she half her DuoNeb treatments.  She states she has had no improvement.  She states after she vomits then she goes into coughing fits because of the acid sensation in her throat.  She denies any fever.    History provided by:  Patient      Review of Systems   Constitutional:  Negative for fever.   HENT:  Negative for congestion.    Respiratory:  Positive for cough and shortness of breath.    Cardiovascular:  Negative for chest pain.   Gastrointestinal:  Positive for diarrhea, nausea and vomiting. Negative for abdominal pain.   Musculoskeletal:  Negative for back pain.   Neurological:  Negative for headaches.   Psychiatric/Behavioral:  Negative for confusion.        Past Medical History:   Diagnosis Date    Allergies     Asthma        Allergies   Allergen Reactions    Breo Ellipta [Fluticasone Furoate-Vilanterol] Seizure       Past Surgical History:   Procedure Laterality Date    LEG TENDON REPAIR Right 01/24/2022    Procedure: TENDON REPAIR FOOT/TOE -posterior tibial tendon repair, Tarsal Tunnel decompression, biopsy of soft tissue mass;  Surgeon: DAMION Hammer DPM;  Location: [facility] OR;  Service: Podiatry;  Laterality: Right;    LYMPH NODE BIOPSY Right     neck       Family History   Problem Relation Age of Onset    No Known Problems Mother     No Known Problems Father     Diabetes Maternal Grandmother     Cancer Maternal Grandmother     Hypertension Maternal Grandmother     Heart disease Maternal  "Grandfather     Heart attack Maternal Grandfather     Hypertension Maternal Grandfather        Social History     Socioeconomic History    Marital status: Single   Tobacco Use    Smoking status: Never    Smokeless tobacco: Never   Vaping Use    Vaping status: Never Used   Substance and Sexual Activity    Alcohol use: Never    Drug use: Never    Sexual activity: Not Currently       /60   Pulse 93   Temp 98.4 °F (36.9 °C) (Temporal)   Resp 12   Ht 170.2 cm (67\")   Wt 68.4 kg (150 lb 12.7 oz)   LMP 03/11/2025 (Approximate)   SpO2 100%   BMI 23.62 kg/m²       Objective   Physical Exam  Vitals and nursing note reviewed.   Constitutional:       Appearance: Normal appearance.   HENT:      Head: Normocephalic and atraumatic.      Mouth/Throat:      Mouth: Mucous membranes are moist.   Cardiovascular:      Rate and Rhythm: Normal rate and regular rhythm.      Heart sounds: Normal heart sounds.   Pulmonary:      Effort: Pulmonary effort is normal. No respiratory distress.      Breath sounds: Normal breath sounds.      Comments: Frequent coughing  Abdominal:      Palpations: Abdomen is soft.      Tenderness: There is no abdominal tenderness.   Skin:     General: Skin is warm and dry.   Neurological:      Mental Status: She is alert and oriented to person, place, and time.         Procedures           ED Course      My interpretation of EKG shows sinus tachycardia, rate of 102, no ST elevation                                     Results for orders placed or performed during the hospital encounter of 03/24/25   CBC Auto Differential    Collection Time: 03/24/25  1:03 PM    Specimen: Blood   Result Value Ref Range    WBC 7.55 3.40 - 10.80 10*3/mm3    RBC 4.46 3.77 - 5.28 10*6/mm3    Hemoglobin 12.6 12.0 - 15.9 g/dL    Hematocrit 38.0 34.0 - 46.6 %    MCV 85.2 79.0 - 97.0 fL    MCH 28.3 26.6 - 33.0 pg    MCHC 33.2 31.5 - 35.7 g/dL    RDW 12.7 12.3 - 15.4 %    RDW-SD 39.2 37.0 - 54.0 fl    MPV 10.2 6.0 - 12.0 fL    " Platelets 286 140 - 450 10*3/mm3    Neutrophil % 60.5 42.7 - 76.0 %    Lymphocyte % 29.7 19.6 - 45.3 %    Monocyte % 7.4 5.0 - 12.0 %    Eosinophil % 1.6 0.3 - 6.2 %    Basophil % 0.4 0.0 - 1.5 %    Immature Grans % 0.4 0.0 - 0.5 %    Neutrophils, Absolute 4.57 1.70 - 7.00 10*3/mm3    Lymphocytes, Absolute 2.24 0.70 - 3.10 10*3/mm3    Monocytes, Absolute 0.56 0.10 - 0.90 10*3/mm3    Eosinophils, Absolute 0.12 0.00 - 0.40 10*3/mm3    Basophils, Absolute 0.03 0.00 - 0.20 10*3/mm3    Immature Grans, Absolute 0.03 0.00 - 0.05 10*3/mm3    nRBC 0.0 0.0 - 0.2 /100 WBC   Pregnancy, Urine - Urine, Clean Catch    Collection Time: 03/24/25  1:15 PM    Specimen: Urine, Clean Catch   Result Value Ref Range    HCG, Urine QL Negative Negative   Urinalysis With Microscopic If Indicated (No Culture) - Urine, Clean Catch    Collection Time: 03/24/25  1:15 PM    Specimen: Urine, Clean Catch   Result Value Ref Range    Color, UA Yellow Yellow, Straw    Appearance, UA Clear Clear    pH, UA 7.5 5.0 - 8.0    Specific Gravity, UA 1.008 1.005 - 1.030    Glucose, UA Negative Negative    Ketones, UA Negative Negative    Bilirubin, UA Negative Negative    Blood, UA Negative Negative    Protein, UA Negative Negative    Leuk Esterase, UA Negative Negative    Nitrite, UA Negative Negative    Urobilinogen, UA 0.2 E.U./dL 0.2 - 1.0 E.U./dL   Respiratory Panel PCR w/COVID-19(SARS-CoV-2) SANTOSH/YUSEF/NEGRO/PAD/COR/GERDA In-House, NP Swab in UTM/VTM, 2 HR TAT - Swab, Nasopharynx    Collection Time: 03/24/25  1:47 PM    Specimen: Nasopharynx; Swab   Result Value Ref Range    ADENOVIRUS, PCR Not Detected Not Detected    Coronavirus 229E Not Detected Not Detected    Coronavirus HKU1 Not Detected Not Detected    Coronavirus NL63 Not Detected Not Detected    Coronavirus OC43 Not Detected Not Detected    COVID19 Not Detected Not Detected - Ref. Range    Human Metapneumovirus Not Detected Not Detected    Human Rhinovirus/Enterovirus Not Detected Not Detected     Influenza A PCR Not Detected Not Detected    Influenza B PCR Not Detected Not Detected    Parainfluenza Virus 1 Not Detected Not Detected    Parainfluenza Virus 2 Not Detected Not Detected    Parainfluenza Virus 3 Not Detected Not Detected    Parainfluenza Virus 4 Not Detected Not Detected    RSV, PCR Not Detected Not Detected    Bordetella pertussis pcr Not Detected Not Detected    Bordetella parapertussis PCR Not Detected Not Detected    Chlamydophila pneumoniae PCR Not Detected Not Detected    Mycoplasma pneumo by PCR Not Detected Not Detected   Comprehensive Metabolic Panel    Collection Time: 03/24/25  1:47 PM    Specimen: Blood   Result Value Ref Range    Glucose 88 65 - 99 mg/dL    BUN 5 (L) 6 - 20 mg/dL    Creatinine 0.64 0.57 - 1.00 mg/dL    Sodium 139 136 - 145 mmol/L    Potassium 3.7 3.5 - 5.2 mmol/L    Chloride 106 98 - 107 mmol/L    CO2 21.9 (L) 22.0 - 29.0 mmol/L    Calcium 8.7 8.6 - 10.5 mg/dL    Total Protein 6.9 6.0 - 8.5 g/dL    Albumin 4.4 3.5 - 5.2 g/dL    ALT (SGPT) 82 (H) 1 - 33 U/L    AST (SGOT) 48 (H) 1 - 32 U/L    Alkaline Phosphatase 78 39 - 117 U/L    Total Bilirubin 0.3 0.0 - 1.2 mg/dL    Globulin 2.5 gm/dL    A/G Ratio 1.8 g/dL    BUN/Creatinine Ratio 7.8 7.0 - 25.0    Anion Gap 11.1 5.0 - 15.0 mmol/L    eGFR 129.9 >60.0 mL/min/1.73   Lipase    Collection Time: 03/24/25  1:47 PM    Specimen: Blood   Result Value Ref Range    Lipase 22 13 - 60 U/L   ECG 12 Lead Chest Pain    Collection Time: 03/24/25  1:49 PM   Result Value Ref Range    QT Interval 349 ms    QTC Interval 455 ms     XR Chest 1 View  Result Date: 3/24/2025  Impression: No active disease. Electronically Signed: Melchor Newell MD  3/24/2025 1:30 PM EDT  Workstation ID: [id number]                  Medical Decision Making  Amount and/or Complexity of Data Reviewed  Labs: ordered.  Radiology: ordered.  ECG/medicine tests: ordered.    Risk  Prescription drug management.      Patient had the above valuation.  Results were discussed  with the patient.  My interpretation of chest x-ray shows no infiltrate or effusion.  EKG shows no acute ischemia.  White blood cell count is normal.  CMP is unremarkable.  Respiratory panel is negative.  Urinalysis shows no UTI.  Patient is well-appearing on exam.  I see no indication for admission at this time.  She will be discharged with prescriptions for Zofran and omeprazole.  She will follow-up with her primary provider and pulmonologist.      Final diagnoses:   Vomiting, unspecified vomiting type, unspecified whether nausea present       ED Disposition  ED Disposition       ED Disposition   Discharge    Condition   Stable    Comment   --               Lola Handley, BELLA  [address] [facility] [place] [id number] [phone]    Call in 2 days           Medication List        New Prescriptions      omeprazole 40 MG capsule  Commonly known as: priLOSEC  Take 1 capsule by mouth Daily.     ondansetron ODT 4 MG disintegrating tablet  Commonly known as: ZOFRAN-ODT  Place 1 tablet on the tongue Every 8 (Eight) Hours As Needed for Nausea or Vomiting.               Where to Get Your Medications        These medications were sent to [id number] - [place], [place] - [address] [place] [address] - [phone]  - [phone] [address] [place] [address]      Hours: 24-hours Phone: [phone]   omeprazole 40 MG capsule  ondansetron ODT 4 MG disintegrating tablet            Chidi Brothers MD  03/24/25 1571

## 2025-03-25 LAB
QT INTERVAL: 349 MS
QTC INTERVAL: 455 MS

## 2025-04-21 ENCOUNTER — HOSPITAL ENCOUNTER (EMERGENCY)
Facility: HOSPITAL | Age: 20
Discharge: HOME | End: 2025-04-21
Payer: COMMERCIAL

## 2025-04-21 ENCOUNTER — APPOINTMENT (OUTPATIENT)
Dept: GENERAL RADIOLOGY | Facility: HOSPITAL | Age: 20
End: 2025-04-21
Payer: COMMERCIAL

## 2025-04-21 VITALS
DIASTOLIC BLOOD PRESSURE: 61 MMHG | SYSTOLIC BLOOD PRESSURE: 101 MMHG | TEMPERATURE: 97.4 F | RESPIRATION RATE: 20 BRPM | HEART RATE: 76 BPM | BODY MASS INDEX: 20.09 KG/M2 | HEIGHT: 67 IN | WEIGHT: 128 LBS | OXYGEN SATURATION: 96 %

## 2025-04-21 DIAGNOSIS — R52 ACUTE GENERALIZED BODY PAIN: ICD-10-CM

## 2025-04-21 DIAGNOSIS — E56.9 VITAMIN DEFICIENCY: ICD-10-CM

## 2025-04-21 DIAGNOSIS — R06.02 SHORTNESS OF BREATH: Primary | ICD-10-CM

## 2025-04-21 LAB
ALBUMIN SERPL-MCNC: 3.7 G/DL (ref 3.5–5.2)
ALBUMIN/GLOB SERPL: 1.4 G/DL
ALP SERPL-CCNC: 55 U/L (ref 39–117)
ALT SERPL W P-5'-P-CCNC: 27 U/L (ref 1–33)
ANION GAP SERPL CALCULATED.3IONS-SCNC: 11.2 MMOL/L (ref 5–15)
AST SERPL-CCNC: 26 U/L (ref 1–32)
BASOPHILS # BLD AUTO: 0.05 10*3/MM3 (ref 0–0.2)
BASOPHILS NFR BLD AUTO: 0.4 % (ref 0–1.5)
BILIRUB SERPL-MCNC: 0.4 MG/DL (ref 0–1.2)
BILIRUB UR QL STRIP: NEGATIVE
BUN SERPL-MCNC: 7 MG/DL (ref 6–20)
BUN/CREAT SERPL: 11.7 (ref 7–25)
CALCIUM SPEC-SCNC: 8.6 MG/DL (ref 8.6–10.5)
CHLORIDE SERPL-SCNC: 102 MMOL/L (ref 98–107)
CLARITY UR: CLEAR
CO2 SERPL-SCNC: 23.8 MMOL/L (ref 22–29)
COLOR UR: YELLOW
CREAT SERPL-MCNC: 0.6 MG/DL (ref 0.57–1)
DEPRECATED RDW RBC AUTO: 39.7 FL (ref 37–54)
EGFRCR SERPLBLD CKD-EPI 2021: 132 ML/MIN/1.73
EOSINOPHIL # BLD AUTO: 0.06 10*3/MM3 (ref 0–0.4)
EOSINOPHIL NFR BLD AUTO: 0.5 % (ref 0.3–6.2)
ERYTHROCYTE [DISTWIDTH] IN BLOOD BY AUTOMATED COUNT: 12.7 % (ref 12.3–15.4)
GLOBULIN UR ELPH-MCNC: 2.7 GM/DL
GLUCOSE SERPL-MCNC: 83 MG/DL (ref 65–99)
GLUCOSE UR STRIP-MCNC: NEGATIVE MG/DL
HCT VFR BLD AUTO: 36.6 % (ref 34–46.6)
HGB BLD-MCNC: 11.6 G/DL (ref 12–15.9)
HGB UR QL STRIP.AUTO: NEGATIVE
IMM GRANULOCYTES # BLD AUTO: 0.04 10*3/MM3 (ref 0–0.05)
IMM GRANULOCYTES NFR BLD AUTO: 0.3 % (ref 0–0.5)
KETONES UR QL STRIP: NEGATIVE
LEUKOCYTE ESTERASE UR QL STRIP.AUTO: NEGATIVE
LYMPHOCYTES # BLD AUTO: 1.64 10*3/MM3 (ref 0.7–3.1)
LYMPHOCYTES NFR BLD AUTO: 12.7 % (ref 19.6–45.3)
MCH RBC QN AUTO: 27.2 PG (ref 26.6–33)
MCHC RBC AUTO-ENTMCNC: 31.7 G/DL (ref 31.5–35.7)
MCV RBC AUTO: 85.9 FL (ref 79–97)
MONOCYTES # BLD AUTO: 0.85 10*3/MM3 (ref 0.1–0.9)
MONOCYTES NFR BLD AUTO: 6.6 % (ref 5–12)
NEUTROPHILS NFR BLD AUTO: 10.23 10*3/MM3 (ref 1.7–7)
NEUTROPHILS NFR BLD AUTO: 79.5 % (ref 42.7–76)
NITRITE UR QL STRIP: NEGATIVE
NRBC BLD AUTO-RTO: 0 /100 WBC (ref 0–0.2)
PH UR STRIP.AUTO: 7.5 [PH] (ref 5–8)
PLATELET # BLD AUTO: 251 10*3/MM3 (ref 140–450)
PMV BLD AUTO: 10.7 FL (ref 6–12)
POTASSIUM SERPL-SCNC: 3.6 MMOL/L (ref 3.5–5.2)
PROT SERPL-MCNC: 6.4 G/DL (ref 6–8.5)
PROT UR QL STRIP: NEGATIVE
QT INTERVAL: 360 MS
QTC INTERVAL: 437 MS
RBC # BLD AUTO: 4.26 10*6/MM3 (ref 3.77–5.28)
SODIUM SERPL-SCNC: 137 MMOL/L (ref 136–145)
SP GR UR STRIP: 1.01 (ref 1–1.03)
UROBILINOGEN UR QL STRIP: NORMAL
WBC NRBC COR # BLD AUTO: 12.87 10*3/MM3 (ref 3.4–10.8)

## 2025-04-21 PROCEDURE — 81003 URINALYSIS AUTO W/O SCOPE: CPT

## 2025-04-21 PROCEDURE — 99284 EMERGENCY DEPT VISIT MOD MDM: CPT

## 2025-04-21 PROCEDURE — 71045 X-RAY EXAM CHEST 1 VIEW: CPT

## 2025-04-21 PROCEDURE — 93005 ELECTROCARDIOGRAM TRACING: CPT | Performed by: EMERGENCY MEDICINE

## 2025-04-21 PROCEDURE — 36415 COLL VENOUS BLD VENIPUNCTURE: CPT

## 2025-04-21 PROCEDURE — 80053 COMPREHEN METABOLIC PANEL: CPT

## 2025-04-21 PROCEDURE — 85025 COMPLETE CBC W/AUTO DIFF WBC: CPT

## 2025-04-21 NOTE — DISCHARGE INSTRUCTIONS
You were seen today for an exacerbation of asthma with shortness of breath.  A breathing treatment was applied by EMS prior to arrival in the hospital, and additional breathing treatments were not indicated after you arrived.  Basic labs were obtained and were all within normal limits, showing no anemia, no electrolyte imbalance, and no kidney injury.  You may use [facility] connections for assistance with obtaining new primary care.  And I recommended you continue with rheumatology consults, as well as follow-up with pulmonology and mental health.  When she obtained primary care, please use them for all nonemergent medical concerns.  Return to the emergency department with any worsening of today's complaints, such as worsening shortness of breath, chest pain, dizziness/lightheadedness, or any other medical complaints or concerns.

## 2025-04-21 NOTE — ED NOTES
Pt states she is here for asthma attack. Pt states she was walking back to work from parking lot and became short of breath. Pt states she used rescue inhaler 4x. Pt states she is on multiple medications for asthma. Pt applied to monitor & placed in gown. V/s stable at this time

## 2025-04-21 NOTE — ED PROVIDER NOTES
Subjective   History of Present Illness    Review of Systems    Past Medical History:   Diagnosis Date    Allergies     Asthma        Allergies   Allergen Reactions    Breo Ellipta [Fluticasone Furoate-Vilanterol] Seizure       Past Surgical History:   Procedure Laterality Date    LEG TENDON REPAIR Right 01/24/2022    Procedure: TENDON REPAIR FOOT/TOE -posterior tibial tendon repair, Tarsal Tunnel decompression, biopsy of soft tissue mass;  Surgeon: DAMION Hammer DPM;  Location: [facility] OR;  Service: Podiatry;  Laterality: Right;    LYMPH NODE BIOPSY Right     neck       Family History   Problem Relation Age of Onset    No Known Problems Mother     No Known Problems Father     Diabetes Maternal Grandmother     Cancer Maternal Grandmother     Hypertension Maternal Grandmother     Heart disease Maternal Grandfather     Heart attack Maternal Grandfather     Hypertension Maternal Grandfather        Social History     Socioeconomic History    Marital status: Single   Tobacco Use    Smoking status: Never    Smokeless tobacco: Never   Vaping Use    Vaping status: Never Used   Substance and Sexual Activity    Alcohol use: Never    Drug use: Never    Sexual activity: Not Currently           Objective   Physical Exam    Procedures           ED Course                                                       Medical Decision Making    You were seen today for an exacerbation of asthma with shortness of breath.  A breathing treatment was applied by EMS prior to arrival in the hospital, and additional breathing treatments were not indicated after you arrived.  Basic labs were obtained and were all within normal limits, showing no anemia, no electrolyte imbalance, and no kidney injury.  You may use [facility] connections for assistance with obtaining new primary care.  And I recommended you continue with rheumatology consults, as well as follow-up with pulmonology and mental health.  When she obtained primary care, please use them  for all nonemergent medical concerns.  Return to the emergency department with any worsening of today's complaints, such as worsening shortness of breath, chest pain, dizziness/lightheadedness, or any other medical complaints or concerns.    Problems Addressed:  Acute generalized body pain: complicated acute illness or injury  Shortness of breath: complicated acute illness or injury  Vitamin deficiency: complicated acute illness or injury    Amount and/or Complexity of Data Reviewed  Labs: ordered.  Radiology: ordered.        Final diagnoses:   Shortness of breath   Acute generalized body pain   Vitamin deficiency       ED Disposition  ED Disposition       ED Disposition   Discharge    Condition   Stable    Comment   --               PATIENT CONNECTION - [facility] [address]  [phone]             Medication List      No changes were made to your prescriptions during this visit.          "General: No focal deficit present.      Mental Status: She is alert. Mental status is at baseline.   Psychiatric:         Mood and Affect: Mood normal.         Behavior: Behavior normal.         Thought Content: Thought content normal.         Judgment: Judgment normal.         Procedures           ED Course  /61   Pulse 76   Temp 97.4 °F (36.3 °C) (Oral)   Resp 20   Ht 170.2 cm (67\")   Wt 58.1 kg (128 lb)   SpO2 96%   BMI 20.05 kg/m²   Labs Reviewed   CBC WITH AUTO DIFFERENTIAL - Abnormal; Notable for the following components:       Result Value    WBC 12.87 (*)     Hemoglobin 11.6 (*)     Neutrophil % 79.5 (*)     Lymphocyte % 12.7 (*)     Neutrophils, Absolute 10.23 (*)     All other components within normal limits   URINALYSIS W/ CULTURE IF INDICATED - Normal    Narrative:     In absence of clinical symptoms, the presence of pyuria, bacteria, and/or nitrites on the urinalysis result does not correlate with infection.  Urine microscopic not indicated.   COMPREHENSIVE METABOLIC PANEL    Narrative:     GFR Categories in Chronic Kidney Disease (CKD)      GFR Category          GFR (mL/min/1.73)    Interpretation  G1                     90 or greater         Normal or high (1)  G2                      60-89                Mild decrease (1)  G3a                   45-59                Mild to moderate decrease  G3b                   30-44                Moderate to severe decrease  G4                    15-29                Severe decrease  G5                    14 or less           Kidney failure          (1)In the absence of evidence of kidney disease, neither GFR category G1 or G2 fulfill the criteria for CKD.    eGFR calculation 2021 CKD-EPI creatinine equation, which does not include race as a factor   CBC AND DIFFERENTIAL    Narrative:     The following orders were created for panel order CBC & Differential.  Procedure                               Abnormality         Status                   " "  ---------                               -----------         ------                     CBC Auto Differential[[id number]]        Abnormal            Final result                 Please view results for these tests on the individual orders.     Medications - No data to display  No radiology results for the last day  XR Chest 1 View  Result Date: 4/27/2025  Impression: No acute cardiopulmonary abnormality. Electronically Signed: Felix Perez MD  4/27/2025 3:25 PM EDT  Workstation ID: [id number]                                                         Medical Decision Making  /61   Pulse 76   Temp 97.4 °F (36.3 °C) (Oral)   Resp 20   Ht 170.2 cm (67\")   Wt 58.1 kg (128 lb)   SpO2 96%   BMI 20.05 kg/m²      Chart review: Patient has had multiple ED/UC visits for respiratory complaints as well as for mental health reasons in 2025.  Most recent complaints and visits reviewed.    Patient is a 20-year-old female who presents to the emergency department with complaints of asthma attack.  Full HPI, primary assessment, and initial physical exam are noted above.    Patient is placed into an ED room and bed for assessment.  IV access is maintained for labs and medication administration if indicated.  Primary differential for patient is asthma attack, pneumonia, sepsis, other viral infection.    Comorbidities:  has a past medical history of Allergic (2016), Allergies, Anxiety (2024), Arthritis (2021), Asthma, Depression (2025), Eating disorder (2019), Traumatic closed nondisplaced fracture of distal fibula, right, initial encounter (05/03/2021), and Visual impairment (2019).    Discussion with provider: Dr. Brothers    Radiology interpretation:  X-rays reviewed by me and interpreted by radiologist,   XR Chest 1 View  Result Date: 4/21/2025  Impression: No active disease. Electronically Signed: Lorenzo Up MD  4/21/2025 5:04 PM EDT  Workstation ID: [id number]      Lab interpretation:  Labs viewed by me significant for " leukocytosis of 12; patient has no other signs of any infectious process.  Otherwise labs unremarkable with CMP negative for electrolyte derangement or kidney injury and unremarkable UA.    EKG reviewed by ED attending physician and myself as sinus rhythm; rate 88,  QTc 437.       Findings and plan of care discussed with patient.  Repeat physical exam completed at this time.  Repeat exam unchanged from previous except patient's level of distress is significantly lowered due to breathing treatments and time.  Patient remains alert and oriented, nontoxic in appearance with GCS 15.  Patient is informed the plan of care will be to discharge from the ED and follow-up with her primary care provider.  Patient is also given patient [facility] information if she desires to obtain new primary care.  Patient verbalizes understanding of and is supportive of these plans.    The following discharge instructions were given to the patient:  You were seen today for an exacerbation of asthma with shortness of breath.  A breathing treatment was applied by EMS prior to arrival in the hospital, and additional breathing treatments were not indicated after you arrived.  Basic labs were obtained and were all within normal limits, showing no anemia, no electrolyte imbalance, and no kidney injury.  You may use [facility] connections for assistance with obtaining new primary care.  And I recommended you continue with rheumatology consults, as well as follow-up with pulmonology and mental health.  When she obtained primary care, please use them for all nonemergent medical concerns.  Return to the emergency department with any worsening of today's complaints, such as worsening shortness of breath, chest pain, dizziness/lightheadedness, or any other medical complaints or concerns.    Appropriate PPE worn during exam.    i discussed findings with patient who voices understanding of discharge instructions, signs and symptoms requiring  return to ED; discharged improved and in stable condition with follow up for re-evaluation.  This document is intended for medical expert use only. Reading of this document by patients and/or patient's family without participating medical staff guidance may result in misinterpretation and unintended morbidity.  Any interpretation of such data is the responsibility of the patient and/or family member responsible for the patient in concert with their primary or specialist providers, not to be left for sources of online searches such as [employer], [employer] or similar queries. Relying on these approaches to knowledge may result in misinterpretation, misguided goals of care and even death should patients or family members try recommendations outside of the realm of professional medical care in a supervised inpatient environment.       Problems Addressed:  Acute generalized body pain: complicated acute illness or injury  Shortness of breath: complicated acute illness or injury  Vitamin deficiency: complicated acute illness or injury    Amount and/or Complexity of Data Reviewed  Labs: ordered.  Radiology: ordered.        Final diagnoses:   Shortness of breath   Acute generalized body pain   Vitamin deficiency       ED Disposition  ED Disposition       ED Disposition   Discharge    Condition   Stable    Comment   --               PATIENT CONNECTION - [place] [id number]  [phone]             Medication List      No changes were made to your prescriptions during this visit.            Kelvin Bower PA-C  05/03/25 1525

## 2025-04-25 ENCOUNTER — OFFICE VISIT (OUTPATIENT)
Dept: FAMILY MEDICINE CLINIC | Facility: CLINIC | Age: 20
End: 2025-04-25
Payer: COMMERCIAL

## 2025-04-25 ENCOUNTER — LAB (OUTPATIENT)
Dept: FAMILY MEDICINE CLINIC | Facility: CLINIC | Age: 20
End: 2025-04-25
Payer: COMMERCIAL

## 2025-04-25 VITALS
BODY MASS INDEX: 24.84 KG/M2 | RESPIRATION RATE: 16 BRPM | DIASTOLIC BLOOD PRESSURE: 61 MMHG | WEIGHT: 158.3 LBS | HEART RATE: 95 BPM | HEIGHT: 67 IN | TEMPERATURE: 97.3 F | SYSTOLIC BLOOD PRESSURE: 108 MMHG | OXYGEN SATURATION: 100 %

## 2025-04-25 DIAGNOSIS — Z00.00 HEALTHCARE MAINTENANCE: ICD-10-CM

## 2025-04-25 DIAGNOSIS — J45.51 SEVERE PERSISTENT ASTHMA WITH ACUTE EXACERBATION: ICD-10-CM

## 2025-04-25 DIAGNOSIS — T78.40XD ALLERGY, SUBSEQUENT ENCOUNTER: ICD-10-CM

## 2025-04-25 DIAGNOSIS — Z00.00 HEALTHCARE MAINTENANCE: Primary | ICD-10-CM

## 2025-04-25 PROBLEM — T78.40XA ALLERGIES: Status: ACTIVE | Noted: 2025-04-25

## 2025-04-25 PROBLEM — S82.831A TRAUMATIC CLOSED NONDISPLACED FRACTURE OF DISTAL FIBULA, RIGHT, INITIAL ENCOUNTER: Status: RESOLVED | Noted: 2021-05-03 | Resolved: 2025-04-25

## 2025-04-25 PROBLEM — F33.2 MDD (MAJOR DEPRESSIVE DISORDER), RECURRENT SEVERE, WITHOUT PSYCHOSIS: Chronic | Status: ACTIVE | Noted: 2025-02-22

## 2025-04-25 LAB
25(OH)D3 SERPL-MCNC: 37.6 NG/ML (ref 30–100)
ALBUMIN SERPL-MCNC: 4.4 G/DL (ref 3.5–5.2)
ALBUMIN/GLOB SERPL: 1.4 G/DL
ALP SERPL-CCNC: 73 U/L (ref 39–117)
ALT SERPL W P-5'-P-CCNC: 66 U/L (ref 1–33)
ANION GAP SERPL CALCULATED.3IONS-SCNC: 9.6 MMOL/L (ref 5–15)
AST SERPL-CCNC: 74 U/L (ref 1–32)
BASOPHILS # BLD MANUAL: 0 10*3/MM3 (ref 0–0.2)
BASOPHILS NFR BLD MANUAL: 0 % (ref 0–1.5)
BILIRUB SERPL-MCNC: 0.3 MG/DL (ref 0–1.2)
BILIRUB UR QL STRIP: NEGATIVE
BUN SERPL-MCNC: 8 MG/DL (ref 6–20)
BUN/CREAT SERPL: 11.4 (ref 7–25)
BURR CELLS BLD QL SMEAR: NORMAL
C TRACH RRNA CVX QL NAA+PROBE: NOT DETECTED
CALCIUM SPEC-SCNC: 9.2 MG/DL (ref 8.6–10.5)
CHLORIDE SERPL-SCNC: 104 MMOL/L (ref 98–107)
CHOLEST SERPL-MCNC: 177 MG/DL (ref 0–200)
CLARITY UR: CLEAR
CO2 SERPL-SCNC: 24.4 MMOL/L (ref 22–29)
COLOR UR: YELLOW
CREAT SERPL-MCNC: 0.7 MG/DL (ref 0.57–1)
DEPRECATED RDW RBC AUTO: 37.9 FL (ref 37–54)
EGFRCR SERPLBLD CKD-EPI 2021: 127.2 ML/MIN/1.73
EOSINOPHIL # BLD MANUAL: 0.27 10*3/MM3 (ref 0–0.4)
EOSINOPHIL NFR BLD MANUAL: 4 % (ref 0.3–6.2)
ERYTHROCYTE [DISTWIDTH] IN BLOOD BY AUTOMATED COUNT: 12.4 % (ref 12.3–15.4)
GLOBULIN UR ELPH-MCNC: 3.1 GM/DL
GLUCOSE SERPL-MCNC: 89 MG/DL (ref 65–99)
GLUCOSE UR STRIP-MCNC: NEGATIVE MG/DL
HBA1C MFR BLD: 5.1 % (ref 4.8–5.6)
HCT VFR BLD AUTO: 38.4 % (ref 34–46.6)
HCV AB SER QL: NORMAL
HDLC SERPL-MCNC: 74 MG/DL (ref 40–60)
HGB BLD-MCNC: 12.5 G/DL (ref 12–15.9)
HGB UR QL STRIP.AUTO: NEGATIVE
HOLD SPECIMEN: NORMAL
KETONES UR QL STRIP: NEGATIVE
LDLC SERPL CALC-MCNC: 91 MG/DL (ref 0–100)
LDLC/HDLC SERPL: 1.22 {RATIO}
LEUKOCYTE ESTERASE UR QL STRIP.AUTO: NEGATIVE
LYMPHOCYTES # BLD MANUAL: 1.34 10*3/MM3 (ref 0.7–3.1)
LYMPHOCYTES NFR BLD MANUAL: 6 % (ref 5–12)
MCH RBC QN AUTO: 27.6 PG (ref 26.6–33)
MCHC RBC AUTO-ENTMCNC: 32.6 G/DL (ref 31.5–35.7)
MCV RBC AUTO: 84.8 FL (ref 79–97)
MONOCYTES # BLD: 0.4 10*3/MM3 (ref 0.1–0.9)
N GONORRHOEA RRNA SPEC QL NAA+PROBE: NOT DETECTED
NEUTROPHILS # BLD AUTO: 4.7 10*3/MM3 (ref 1.7–7)
NEUTROPHILS NFR BLD MANUAL: 70 % (ref 42.7–76)
NITRITE UR QL STRIP: NEGATIVE
OVALOCYTES BLD QL SMEAR: NORMAL
PH UR STRIP.AUTO: 7 [PH] (ref 5–8)
PLAT MORPH BLD: NORMAL
PLATELET # BLD AUTO: 316 10*3/MM3 (ref 140–450)
PMV BLD AUTO: 11.6 FL (ref 6–12)
POIKILOCYTOSIS BLD QL SMEAR: NORMAL
POTASSIUM SERPL-SCNC: 4.5 MMOL/L (ref 3.5–5.2)
PROT SERPL-MCNC: 7.5 G/DL (ref 6–8.5)
PROT UR QL STRIP: ABNORMAL
RBC # BLD AUTO: 4.53 10*6/MM3 (ref 3.77–5.28)
SODIUM SERPL-SCNC: 138 MMOL/L (ref 136–145)
SP GR UR STRIP: 1.02 (ref 1–1.03)
T4 FREE SERPL-MCNC: 1.03 NG/DL (ref 0.93–1.7)
TRICHOMONAS VAGINALIS PCR: NOT DETECTED
TRIGL SERPL-MCNC: 62 MG/DL (ref 0–150)
TSH SERPL DL<=0.05 MIU/L-ACNC: 1.86 UIU/ML (ref 0.27–4.2)
UROBILINOGEN UR QL STRIP: ABNORMAL
VARIANT LYMPHS NFR BLD MANUAL: 20 % (ref 19.6–45.3)
VIT B12 BLD-MCNC: 663 PG/ML (ref 211–946)
VLDLC SERPL-MCNC: 12 MG/DL (ref 5–40)
WBC MORPH BLD: NORMAL
WBC NRBC COR # BLD AUTO: 6.71 10*3/MM3 (ref 3.4–10.8)

## 2025-04-25 PROCEDURE — 87491 CHLMYD TRACH DNA AMP PROBE: CPT | Performed by: NURSE PRACTITIONER

## 2025-04-25 PROCEDURE — 80061 LIPID PANEL: CPT | Performed by: NURSE PRACTITIONER

## 2025-04-25 PROCEDURE — 81003 URINALYSIS AUTO W/O SCOPE: CPT | Performed by: NURSE PRACTITIONER

## 2025-04-25 PROCEDURE — 85007 BL SMEAR W/DIFF WBC COUNT: CPT | Performed by: NURSE PRACTITIONER

## 2025-04-25 PROCEDURE — 87591 N.GONORRHOEAE DNA AMP PROB: CPT | Performed by: NURSE PRACTITIONER

## 2025-04-25 PROCEDURE — 86803 HEPATITIS C AB TEST: CPT | Performed by: NURSE PRACTITIONER

## 2025-04-25 PROCEDURE — 82607 VITAMIN B-12: CPT | Performed by: NURSE PRACTITIONER

## 2025-04-25 PROCEDURE — 82306 VITAMIN D 25 HYDROXY: CPT | Performed by: NURSE PRACTITIONER

## 2025-04-25 PROCEDURE — 87661 TRICHOMONAS VAGINALIS AMPLIF: CPT | Performed by: NURSE PRACTITIONER

## 2025-04-25 PROCEDURE — 83036 HEMOGLOBIN GLYCOSYLATED A1C: CPT | Performed by: NURSE PRACTITIONER

## 2025-04-25 PROCEDURE — 84439 ASSAY OF FREE THYROXINE: CPT | Performed by: NURSE PRACTITIONER

## 2025-04-25 PROCEDURE — 80050 GENERAL HEALTH PANEL: CPT | Performed by: NURSE PRACTITIONER

## 2025-04-25 PROCEDURE — 36415 COLL VENOUS BLD VENIPUNCTURE: CPT

## 2025-04-25 RX ORDER — HYDROXYZINE PAMOATE 50 MG/1
50 CAPSULE ORAL 4 TIMES DAILY PRN
COMMUNITY
Start: 2025-03-14

## 2025-04-25 RX ORDER — CETIRIZINE HYDROCHLORIDE 10 MG/1
10 TABLET ORAL DAILY
COMMUNITY
Start: 2025-04-01

## 2025-04-25 RX ORDER — TIOTROPIUM BROMIDE AND OLODATEROL 3.124; 2.736 UG/1; UG/1
SPRAY, METERED RESPIRATORY (INHALATION)
COMMUNITY
Start: 2025-04-11

## 2025-04-25 RX ORDER — CARIPRAZINE 1.5 MG/1
1 CAPSULE, GELATIN COATED ORAL DAILY
COMMUNITY
Start: 2025-04-11

## 2025-04-25 RX ORDER — LAMOTRIGINE 100 MG/1
0.5 TABLET ORAL EVERY 12 HOURS SCHEDULED
COMMUNITY
Start: 2025-04-03

## 2025-04-25 RX ORDER — FLUVOXAMINE MALEATE 50 MG
50 TABLET ORAL NIGHTLY
COMMUNITY
Start: 2025-04-03

## 2025-04-25 RX ORDER — TEZEPELUMAB-EKKO 210 MG/1.9ML
INJECTION, SOLUTION SUBCUTANEOUS
COMMUNITY
Start: 2025-04-23

## 2025-04-25 RX ORDER — FLUTICASONE PROPIONATE 50 MCG
2 SPRAY, SUSPENSION (ML) NASAL DAILY
COMMUNITY

## 2025-04-25 NOTE — ASSESSMENT & PLAN NOTE
Asthma is worsening.  The patient is experiencing frequent daytime asthma symptoms and she is experiencing weekly nighttime asthma symptoms.    Plan: Continue same medication/s without change.   and f/u with Dr Krishna.    Discussed medication dosage, use, side effects, and goals of treatment in detail.    Discussed distinction between quick-relief and maintenance control medications.  Discussed monitoring symptoms and use of quick-relief medications and contacting provider early in the course of exacerbations.  Warning signs of respiratory distress were reviewed with the patient.   Pulmonology referral.  F/u with Dr Krishna asap.    Patient Treatment Goals: symptom prevention, minimizing limitation in activity, prevention of exacerbations and use of ER/inpatient care, maintenance of optimal pulmonary function, and minimization of adverse effects of treatment.    Followup in 2 weeks.

## 2025-04-25 NOTE — PROGRESS NOTES
"Chief Complaint  Establish Care and Asthma    Subjective        Rand Asencio presents to [facility]  History of Present Illness  Very pleasant 19 y/o  female presents to PC office to establish care.    Previous PCP, Lola Handley NP - last seen in March of 2025.    Asthma - Zehra Krishna.  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  Asthma, Long Covid.    MDD, PTSD, Eating disorder - Viktor ( saw 4/24/25 and f/u is in one month)    Allergist - Orlando Lognoria, [facility]    Rheumatology - pending establishment; 12/25.    LMP 4/6/25  Asthma  She complains of chest tightness, cough and shortness of breath. This is a chronic problem. The current episode started more than 1 year ago. The problem occurs daily. The problem has been gradually worsening. The cough is non-productive. Associated symptoms include dyspnea on exertion. Pertinent negatives include no myalgias or nasal congestion. Her symptoms are aggravated by any activity. Her symptoms are alleviated by rest, beta-agonist and ipratropium. She reports minimal improvement on treatment. Her symptoms are not alleviated by beta-agonist. Her past medical history is significant for asthma, bronchiectasis, bronchitis and pneumonia.       Objective   Vital Signs:  /61 (BP Location: Left arm, Patient Position: Sitting, Cuff Size: Adult)   Pulse 95   Temp 97.3 °F (36.3 °C) (Infrared)   Resp 16   Ht 170.2 cm (67\")   Wt 71.8 kg (158 lb 4.8 oz)   SpO2 100%   BMI 24.79 kg/m²   Estimated body mass index is 24.79 kg/m² as calculated from the following:    Height as of this encounter: 170.2 cm (67\").    Weight as of this encounter: 71.8 kg (158 lb 4.8 oz).    BMI is within normal parameters. No other follow-up for BMI required.      Physical Exam  Constitutional:       General: She is not in acute distress.     Appearance: Normal appearance. She is normal weight. She is " not ill-appearing, toxic-appearing or diaphoretic.   HENT:      Head: Normocephalic and atraumatic.      Right Ear: Tympanic membrane, ear canal and external ear normal. There is no impacted cerumen.      Left Ear: Tympanic membrane, ear canal and external ear normal. There is no impacted cerumen.      Nose: Nose normal. No congestion or rhinorrhea.      Mouth/Throat:      Mouth: Mucous membranes are moist.      Pharynx: Oropharynx is clear. No oropharyngeal exudate or posterior oropharyngeal erythema.   Eyes:      Extraocular Movements: Extraocular movements intact.      Pupils: Pupils are equal, round, and reactive to light.   Cardiovascular:      Rate and Rhythm: Normal rate.      Pulses: Normal pulses.      Heart sounds: Normal heart sounds.   Pulmonary:      Effort: Pulmonary effort is normal.      Breath sounds: Wheezing and rhonchi present.   Abdominal:      General: Abdomen is flat. Bowel sounds are normal.      Palpations: Abdomen is soft.      Tenderness: There is no abdominal tenderness. There is no right CVA tenderness, left CVA tenderness, guarding or rebound.   Musculoskeletal:         General: Normal range of motion.      Cervical back: Normal range of motion and neck supple.      Right lower leg: No edema.      Left lower leg: No edema.   Skin:     General: Skin is warm and dry.      Capillary Refill: Capillary refill takes less than 2 seconds.   Neurological:      General: No focal deficit present.      Mental Status: She is alert and oriented to person, place, and time.      Motor: Weakness present.   Psychiatric:         Mood and Affect: Mood normal.         Behavior: Behavior normal.         Thought Content: Thought content normal.         Judgment: Judgment normal.        Result Review  N/a         Assessment and Plan   Diagnoses and all orders for this visit:    1. Healthcare maintenance (Primary)  Assessment & Plan:  [facility] placard paperwork filled out.  Physical exam performed.  Establish care  labs ordered and obtained.  RTO in 2-4 weeks for f/u and to discuss establish care labs.  No refills of maintenance medications needed at this time.    Orders:  -     Vitamin D 25 hydroxy; Future  -     Vitamin B12  -     Ambulatory Referral to Gynecology  -     Hepatitis C antibody; Future  -     Urinalysis With Culture If Indicated - Urine, Clean Catch; Future  -     Chlamydia trachomatis, Neisseria gonorrhoeae, Trichomonas vaginalis, PCR - Urine, Urine, Clean Catch  -     CBC w MANUAL Differential; Future  -     Comprehensive metabolic panel; Future  -     TSH+Free T4; Future  -     Lipid panel; Future  -     Hemoglobin A1c; Future    2. Severe persistent asthma with acute exacerbation  Assessment & Plan:  Asthma is worsening.  The patient is experiencing frequent daytime asthma symptoms and she is experiencing weekly nighttime asthma symptoms.    Plan: Continue same medication/s without change.   and f/u with Dr Krishna.    Discussed medication dosage, use, side effects, and goals of treatment in detail.    Discussed distinction between quick-relief and maintenance control medications.  Discussed monitoring symptoms and use of quick-relief medications and contacting provider early in the course of exacerbations.  Warning signs of respiratory distress were reviewed with the patient.   Pulmonology referral.  F/u with Dr Krishna, asap.    Patient Treatment Goals: symptom prevention, minimizing limitation in activity, prevention of exacerbations and use of ER/inpatient care, maintenance of optimal pulmonary function, and minimization of adverse effects of treatment.    Followup in 2 weeks.            Orders:  -     CT Chest Without Contrast; Future    3. Allergy, subsequent encounter  Assessment & Plan:  Continue Cetirizine, daily and Flonase, daily.  Continue singulair 10mg, nightly.             I spent 30 minutes caring for Rand on this date of service. This time includes time spent by me in the following  activities:obtaining and/or reviewing a separately obtained history, performing a medically appropriate examination and/or evaluation , counseling and educating the patient/family/caregiver, ordering medications, tests, or procedures, referring and communicating with other health care professionals , and documenting information in the medical record  Follow Up   Return in about 3 weeks (around 5/16/2025).  Patient was given instructions and counseling regarding her condition or for health maintenance advice. Please see specific information pulled into the AVS if appropriate.

## 2025-04-25 NOTE — ASSESSMENT & PLAN NOTE
BMV placard paperwork filled out.  Physical exam performed.  Establish care labs ordered and obtained.  RTO in 2-4 weeks for f/u and to discuss establish care labs.  No refills of maintenance medications needed at this time.

## 2025-04-27 ENCOUNTER — APPOINTMENT (OUTPATIENT)
Dept: GENERAL RADIOLOGY | Facility: HOSPITAL | Age: 20
End: 2025-04-27
Payer: COMMERCIAL

## 2025-04-27 ENCOUNTER — HOSPITAL ENCOUNTER (EMERGENCY)
Facility: HOSPITAL | Age: 20
Discharge: HOME | End: 2025-04-27
Admitting: EMERGENCY MEDICINE
Payer: COMMERCIAL

## 2025-04-27 VITALS
BODY MASS INDEX: 24.57 KG/M2 | HEART RATE: 105 BPM | TEMPERATURE: 98.5 F | SYSTOLIC BLOOD PRESSURE: 132 MMHG | WEIGHT: 156.53 LBS | RESPIRATION RATE: 8 BRPM | OXYGEN SATURATION: 100 % | HEIGHT: 67 IN | DIASTOLIC BLOOD PRESSURE: 80 MMHG

## 2025-04-27 DIAGNOSIS — F41.9 ANXIETY: ICD-10-CM

## 2025-04-27 DIAGNOSIS — J45.21 EXACERBATION OF INTERMITTENT ASTHMA, UNSPECIFIED ASTHMA SEVERITY: Primary | ICD-10-CM

## 2025-04-27 DIAGNOSIS — R06.02 SHORTNESS OF BREATH: ICD-10-CM

## 2025-04-27 DIAGNOSIS — R06.2 WHEEZING: ICD-10-CM

## 2025-04-27 LAB
ANION GAP SERPL CALCULATED.3IONS-SCNC: 11.7 MMOL/L (ref 5–15)
B PARAPERT DNA SPEC QL NAA+PROBE: NOT DETECTED
B PERT DNA SPEC QL NAA+PROBE: NOT DETECTED
BASOPHILS # BLD AUTO: 0.04 10*3/MM3 (ref 0–0.2)
BASOPHILS NFR BLD AUTO: 0.5 % (ref 0–1.5)
BUN SERPL-MCNC: 9 MG/DL (ref 6–20)
BUN/CREAT SERPL: 12.3 (ref 7–25)
C PNEUM DNA NPH QL NAA+NON-PROBE: NOT DETECTED
CALCIUM SPEC-SCNC: 9.3 MG/DL (ref 8.6–10.5)
CHLORIDE SERPL-SCNC: 102 MMOL/L (ref 98–107)
CO2 SERPL-SCNC: 22.3 MMOL/L (ref 22–29)
CREAT SERPL-MCNC: 0.73 MG/DL (ref 0.57–1)
D DIMER PPP FEU-MCNC: <0.27 MCGFEU/ML (ref 0–0.5)
DEPRECATED RDW RBC AUTO: 37.6 FL (ref 37–54)
EGFRCR SERPLBLD CKD-EPI 2021: 120.9 ML/MIN/1.73
EOSINOPHIL # BLD AUTO: 0.05 10*3/MM3 (ref 0–0.4)
EOSINOPHIL NFR BLD AUTO: 0.6 % (ref 0.3–6.2)
ERYTHROCYTE [DISTWIDTH] IN BLOOD BY AUTOMATED COUNT: 12.5 % (ref 12.3–15.4)
FLUAV SUBTYP SPEC NAA+PROBE: NOT DETECTED
FLUBV RNA ISLT QL NAA+PROBE: NOT DETECTED
GLUCOSE SERPL-MCNC: 86 MG/DL (ref 65–99)
HADV DNA SPEC NAA+PROBE: NOT DETECTED
HCOV 229E RNA SPEC QL NAA+PROBE: NOT DETECTED
HCOV HKU1 RNA SPEC QL NAA+PROBE: NOT DETECTED
HCOV NL63 RNA SPEC QL NAA+PROBE: NOT DETECTED
HCOV OC43 RNA SPEC QL NAA+PROBE: NOT DETECTED
HCT VFR BLD AUTO: 38.5 % (ref 34–46.6)
HGB BLD-MCNC: 12.8 G/DL (ref 12–15.9)
HMPV RNA NPH QL NAA+NON-PROBE: NOT DETECTED
HOLD SPECIMEN: NORMAL
HOLD SPECIMEN: NORMAL
HPIV1 RNA ISLT QL NAA+PROBE: NOT DETECTED
HPIV2 RNA SPEC QL NAA+PROBE: NOT DETECTED
HPIV3 RNA NPH QL NAA+PROBE: NOT DETECTED
HPIV4 P GENE NPH QL NAA+PROBE: NOT DETECTED
IMM GRANULOCYTES # BLD AUTO: 0.03 10*3/MM3 (ref 0–0.05)
IMM GRANULOCYTES NFR BLD AUTO: 0.3 % (ref 0–0.5)
LYMPHOCYTES # BLD AUTO: 1.7 10*3/MM3 (ref 0.7–3.1)
LYMPHOCYTES NFR BLD AUTO: 19.2 % (ref 19.6–45.3)
M PNEUMO IGG SER IA-ACNC: NOT DETECTED
MCH RBC QN AUTO: 27.3 PG (ref 26.6–33)
MCHC RBC AUTO-ENTMCNC: 33.2 G/DL (ref 31.5–35.7)
MCV RBC AUTO: 82.1 FL (ref 79–97)
MONOCYTES # BLD AUTO: 0.79 10*3/MM3 (ref 0.1–0.9)
MONOCYTES NFR BLD AUTO: 8.9 % (ref 5–12)
NEUTROPHILS NFR BLD AUTO: 6.23 10*3/MM3 (ref 1.7–7)
NEUTROPHILS NFR BLD AUTO: 70.5 % (ref 42.7–76)
NRBC BLD AUTO-RTO: 0 /100 WBC (ref 0–0.2)
PLATELET # BLD AUTO: 311 10*3/MM3 (ref 140–450)
PMV BLD AUTO: 10.2 FL (ref 6–12)
POTASSIUM SERPL-SCNC: 3.9 MMOL/L (ref 3.5–5.2)
RBC # BLD AUTO: 4.69 10*6/MM3 (ref 3.77–5.28)
RHINOVIRUS RNA SPEC NAA+PROBE: NOT DETECTED
RSV RNA NPH QL NAA+NON-PROBE: NOT DETECTED
SARS-COV-2 RNA RESP QL NAA+PROBE: NOT DETECTED
SODIUM SERPL-SCNC: 136 MMOL/L (ref 136–145)
WBC NRBC COR # BLD AUTO: 8.84 10*3/MM3 (ref 3.4–10.8)
WHOLE BLOOD HOLD COAG: NORMAL
WHOLE BLOOD HOLD SPECIMEN: NORMAL

## 2025-04-27 PROCEDURE — 85379 FIBRIN DEGRADATION QUANT: CPT

## 2025-04-27 PROCEDURE — 94799 UNLISTED PULMONARY SVC/PX: CPT

## 2025-04-27 PROCEDURE — 96374 THER/PROPH/DIAG INJ IV PUSH: CPT

## 2025-04-27 PROCEDURE — 93005 ELECTROCARDIOGRAM TRACING: CPT

## 2025-04-27 PROCEDURE — 80048 BASIC METABOLIC PNL TOTAL CA: CPT

## 2025-04-27 PROCEDURE — 0202U NFCT DS 22 TRGT SARS-COV-2: CPT

## 2025-04-27 PROCEDURE — 99284 EMERGENCY DEPT VISIT MOD MDM: CPT

## 2025-04-27 PROCEDURE — 94640 AIRWAY INHALATION TREATMENT: CPT

## 2025-04-27 PROCEDURE — 71045 X-RAY EXAM CHEST 1 VIEW: CPT

## 2025-04-27 PROCEDURE — 94664 DEMO&/EVAL PT USE INHALER: CPT

## 2025-04-27 PROCEDURE — 94761 N-INVAS EAR/PLS OXIMETRY MLT: CPT

## 2025-04-27 PROCEDURE — 25010000002 DEXAMETHASONE PER 1 MG

## 2025-04-27 PROCEDURE — 85025 COMPLETE CBC W/AUTO DIFF WBC: CPT

## 2025-04-27 RX ORDER — PREDNISONE 20 MG/1
20 TABLET ORAL DAILY
Qty: 4 TABLET | Refills: 0 | Status: SHIPPED | OUTPATIENT
Start: 2025-04-27 | End: 2025-05-01

## 2025-04-27 RX ORDER — DEXAMETHASONE SODIUM PHOSPHATE 4 MG/ML
10 INJECTION, SOLUTION INTRA-ARTICULAR; INTRALESIONAL; INTRAMUSCULAR; INTRAVENOUS; SOFT TISSUE ONCE
Status: COMPLETED | OUTPATIENT
Start: 2025-04-27 | End: 2025-04-27

## 2025-04-27 RX ORDER — IPRATROPIUM BROMIDE AND ALBUTEROL SULFATE 2.5; .5 MG/3ML; MG/3ML
3 SOLUTION RESPIRATORY (INHALATION) ONCE
Status: COMPLETED | OUTPATIENT
Start: 2025-04-27 | End: 2025-04-27

## 2025-04-27 RX ORDER — SODIUM CHLORIDE 0.9 % (FLUSH) 0.9 %
10 SYRINGE (ML) INJECTION AS NEEDED
Status: DISCONTINUED | OUTPATIENT
Start: 2025-04-27 | End: 2025-04-27 | Stop reason: HOSPADM

## 2025-04-27 RX ADMIN — DEXAMETHASONE SODIUM PHOSPHATE 10 MG: 4 INJECTION, SOLUTION INTRAMUSCULAR; INTRAVENOUS at 13:28

## 2025-04-27 RX ADMIN — IPRATROPIUM BROMIDE AND ALBUTEROL SULFATE 3 ML: .5; 3 SOLUTION RESPIRATORY (INHALATION) at 16:04

## 2025-04-27 NOTE — DISCHARGE INSTRUCTIONS
Take prednisone daily starting tomorrow for the next 4 days.  Continue taking medications previously prescribed to you as directed.  Rest.  Avoid triggers such as allergens.      Follow-up with your pulmonologist for further evaluation of asthma.  Follow-up with primary care provider as needed.    Return to the ER for new or worsening symptoms.

## 2025-04-27 NOTE — ED PROVIDER NOTES
"Subjective   History of Present Illness  Patient is a pleasant 20-year old female with a history of asthma and anxiety presenting to the ER from home with complaints of SOA and midsternal chest pain following an asthma attack that woke her from sleep this morning. She states her asthma attacks will typically cause a spike in BP and HR that will \"level out\" but today she felt that her heart rate did not improve as normal. She reports palpitations since the asthma attack without dysuria, dizziness, or nausea. She took all her asthma medications this morning and reports allergies to Breo. She denies known exposure to illness and recent travel.     Pulm: Tomi  PCP: CHRISTINA Reyes      Review of Systems   Constitutional:  Negative for appetite change and fever.   Respiratory:  Positive for cough, chest tightness and shortness of breath.    Cardiovascular:  Positive for palpitations.   Gastrointestinal:  Negative for abdominal pain and nausea.   Genitourinary:  Negative for dysuria.   Neurological:  Negative for headaches.   Psychiatric/Behavioral:  The patient is nervous/anxious.    All other systems reviewed and are negative.      Past Medical History:   Diagnosis Date    Allergic 2016    Last allergy test was with  at [facility] in [place] in 2024    Allergies     Anxiety 2024    Arthritis 2021    Due to multiple ankle sprains/surgery    Asthma     Depression 2025    Due to suicide attempt    Eating disorder 2019    Due to wrestling    Traumatic closed nondisplaced fracture of distal fibula, right, initial encounter 05/03/2021    Visual impairment 2019    Astigmatism       Allergies   Allergen Reactions    Fluticasone Furoate-Vilanterol Seizure       Past Surgical History:   Procedure Laterality Date    LEG TENDON REPAIR Right 01/24/2022    Procedure: TENDON REPAIR FOOT/TOE -posterior tibial tendon repair, Tarsal Tunnel decompression, biopsy of soft tissue mass;  Surgeon: DAMION Hammer, " DPM;  Location: [facility] MAIN OR;  Service: Podiatry;  Laterality: Right;    LYMPH NODE BIOPSY Right     neck       Family History   Problem Relation Age of Onset    Miscarriages / Stillbirths Mother     No Known Problems Father     Diabetes Maternal Grandmother     Cancer Maternal Grandmother     Hypertension Maternal Grandmother     Heart disease Maternal Grandmother     Vision loss Maternal Grandmother     Heart disease Maternal Grandfather     Heart attack Maternal Grandfather     Hypertension Maternal Grandfather        Social History     Socioeconomic History    Marital status: Single   Tobacco Use    Smoking status: Never     Passive exposure: Never    Smokeless tobacco: Never   Vaping Use    Vaping status: Never Used   Substance and Sexual Activity    Alcohol use: Never    Drug use: Never    Sexual activity: Yes     Partners: Male     Birth control/protection: Condom           Objective   Physical Exam  Vitals and nursing note reviewed.   Constitutional:       General: She is not in acute distress.     Appearance: Normal appearance. She is not ill-appearing.   HENT:      Head: Normocephalic and atraumatic.   Eyes:      Pupils: Pupils are equal, round, and reactive to light.   Cardiovascular:      Rate and Rhythm: Regular rhythm. Tachycardia present.      Pulses: Normal pulses.      Heart sounds: Normal heart sounds. No murmur heard.  Pulmonary:      Effort: No respiratory distress.      Breath sounds: Wheezing present.   Abdominal:      General: Bowel sounds are normal.      Tenderness: There is no abdominal tenderness.   Neurological:      Mental Status: She is alert and oriented to person, place, and time.         Procedures           ED Course  ED Course as of 04/27/25 1622   Sun Apr 27, 2025   1612 Respiratory therapist at bedside administering breathing treatment [SJ]      ED Course User Index  [SJ] Phoebe Reveles, APRN      /70   Pulse 83   Temp 98.5 °F (36.9 °C) (Oral)   Resp 8   Ht 170.2  "cm (67\")   Wt 71 kg (156 lb 8.4 oz)   LMP 04/06/2025   SpO2 97%   BMI 24.52 kg/m²   Labs Reviewed   CBC WITH AUTO DIFFERENTIAL - Abnormal; Notable for the following components:       Result Value    Lymphocyte % 19.2 (*)     All other components within normal limits   RESPIRATORY PANEL PCR W/ COVID-19 (SARS-COV-2), NP SWAB IN UTM/VTP, 2 HR TAT - Normal    Narrative:     In the setting of a positive respiratory panel with a viral infection PLUS a negative procalcitonin without other underlying concern for bacterial infection, consider observing off antibiotics or discontinuation of antibiotics and continue supportive care. If the respiratory panel is positive for atypical bacterial infection (Bordetella pertussis, Chlamydophila pneumoniae, or Mycoplasma pneumoniae), consider antibiotic de-escalation to target atypical bacterial infection.   BASIC METABOLIC PANEL - Normal    Narrative:     GFR Categories in Chronic Kidney Disease (CKD)      GFR Category          GFR (mL/min/1.73)    Interpretation  G1                     90 or greater         Normal or high (1)  G2                      60-89                Mild decrease (1)  G3a                   45-59                Mild to moderate decrease  G3b                   30-44                Moderate to severe decrease  G4                    15-29                Severe decrease  G5                    14 or less           Kidney failure          (1)In the absence of evidence of kidney disease, neither GFR category G1 or G2 fulfill the criteria for CKD.    eGFR calculation 2021 CKD-EPI creatinine equation, which does not include race as a factor   D-DIMER, QUANTITATIVE - Normal    Narrative:     According to the assay 's published package insert, a normal (<0.50 MCGFEU/mL) D-dimer result in conjunction with a non-high clinical probability assessment, excludes deep vein thrombosis (DVT) and pulmonary embolism (PE) with high sensitivity.    D-dimer values " "increase with age and this can make VTE exclusion of an older population difficult. To address this, the American College of Physicians, based on best available evidence and recent guidelines, recommends that clinicians use age-adjusted D-dimer thresholds in patients greater than 50 years of age with: a) a low probability of PE who do not meet all Pulmonary Embolism Rule Out Criteria, or b) in those with intermediate probability of PE.   The formula for an age-adjusted D-dimer cut-off is \"age/100\".  For example, a 60 year old patient would have an age-adjusted cut-off of 0.60 MCGFEU/mL and an 80 year old 0.80 MCGFEU/mL.   RAINBOW DRAW    Narrative:     The following orders were created for panel order [place] Draw.  Procedure                               Abnormality         Status                     ---------                               -----------         ------                     Green Top (Gel)[[id number]]                                  Final result               Lavender Top[[id number]]                                     Final result               Gold Top - SST[[id number]]                                   Final result               Light Blue Top[[id number]]                                   Final result                 Please view results for these tests on the individual orders.   GREEN TOP   LAVENDER TOP   GOLD TOP - SST   LIGHT BLUE TOP   CBC AND DIFFERENTIAL    Narrative:     The following orders were created for panel order CBC & Differential.  Procedure                               Abnormality         Status                     ---------                               -----------         ------                     CBC Auto Differential[[id number]]        Abnormal            Final result                 Please view results for these tests on the individual orders.     Medications   sodium chloride 0.9 % flush 10 mL (has no administration in time range)   dexAMETHasone (DECADRON) injection 10 mg (10 mg " Intravenous Given 4/27/25 1328)   ipratropium-albuterol (DUO-NEB) nebulizer solution 3 mL (3 mL Nebulization Given 4/27/25 1604)     XR Chest 1 View  Result Date: 4/27/2025  Impression: No acute cardiopulmonary abnormality. Electronically Signed: Felix Perez MD  4/27/2025 3:25 PM EDT  Workstation ID: [id number]                                                     Medical Decision Making  Amount and/or Complexity of Data Reviewed  Labs: ordered. Decision-making details documented in ED Course.  Radiology: ordered. Decision-making details documented in ED Course.  ECG/medicine tests: ordered.     Details: My interpretation of EKG reveals sinus tachycardia with a regular rate of 105.  No acute ST elevation or ectopy.  Other than rate, there is no significant change from previous study completed 4/21/2025.  EKG was also reviewed by Dr. Pierre, who is agreeable to my interpretation.    Risk  Prescription drug management.    Patient is a 20-year old female with a history of asthma who presents to the emergency room with complaints of shortness of breath related to an asthma attack that woke her up from sleep this morning.  On exam, patient is anxious but answers questions appropriately.  Vitals are stable and she is not tachycardic or hypoxic.  Mild tachypnea noted.  on exam, she has normal S1/S2 without clicks murmurs.  No JVD.  Wheezing noted throughout lungs on auscultation.  Her abdomen is soft and nontender with normal bowel sounds throughout.  Initial differentials include asthma exacerbation, viral syndrome, pneumonia, pulmonary embolism.  This is not a complete list.    IV was established labs were obtained.  Patient received the above examination.  She was given Decadron in the emergency room.  Her CBC and CMP are grossly unremarkable.  D-dimer was collected to rule out pulmonary embolism and found to be negative.  Respiratory panel negative.  My interpretation of chest x-ray reveals no evidence of infiltrates,  pneumothorax or nodules.  This did concur with radiologist, who notes an unremarkable study.  After negative respiratory panel was reviewed, breathing treatment administered from respiratory therapist.  Upon reassessment, patient is far less anxious and heart rate has improved.  She is no longer tachycardic.  Patient feels generally improved after treatment in the emergency room.  At this time, I do not believe further treatment or admission is required.  Discharge was discussed with patient and she will be given a short course of steroids with follow-up urged to her primary care provider or pulmonologist.  Additionally, I considered treating patient's anxiety but she affirms that she takes anxiety medication at home.  I encouraged her to continue doing so because her anxiety may be compounding her asthma and vice versa.  She verbalized understanding and is agreeable to plan of care.  No acute distress noted.  She is ambulatory upright, steadily without assistance upon discharge.    I discussed the findings with patient who voices understanding of discharge instructions, signs and symptoms requiring return to the ED; discharged improved and stable condition with follow-up for reevaluation.    Patient is aware that discharge does not mean that nothing is wrong but it indicates no emergency is present and they must continue care with follow-up as given below or physician of their choice.    This document is intended for medical expert use only.  Reading of this document by patients and/or patient's family without participating medical staff guidance may result in misinterpretation and unintended morbidity.  Any interpretation of such data is the responsibility of the patient and/or family member responsible for the patient in concert with their primary or specialist providers, not to be left for sources of online search as such as [place], ooma or similar queries.  Relying on these approaches to knowledge may result  in misinterpretation, misguided goals of care and even death should patient or family members try recommendations outside of the realm of professional medical care in a supervised inpatient environment.    This medical document was created using Dragon dictation system. Some errors in speech recognition may occur.    Final diagnoses:   Exacerbation of intermittent asthma, unspecified asthma severity   Shortness of breath   Wheezing   Anxiety       ED Disposition  ED Disposition       ED Disposition   Discharge    Condition   Stable    Comment   --               Aida Reyes, APRN  [address] [facility]  [address]  [place] [address] [phone]          Zehra Krishna MD  [address] [place] [address] [phone]               Medication List        New Prescriptions      predniSONE 20 MG tablet  Commonly known as: DELTASONE  Take 1 tablet by mouth Daily for 4 days.               Where to Get Your Medications        These medications were sent to [facility]/pharmacy #[id number] - [place], [place] - [address] [place] [address] - [phone]  - [phone] [address] [place] [address]      Hours: 24-hours Phone: [phone]   predniSONE 20 MG tablet            Phoebe Reveles, APRN  04/27/25 8451

## 2025-04-28 LAB
QT INTERVAL: 324 MS
QTC INTERVAL: 428 MS

## 2025-05-02 ENCOUNTER — TELEPHONE (OUTPATIENT)
Dept: FAMILY MEDICINE CLINIC | Facility: CLINIC | Age: 20
End: 2025-05-02
Payer: COMMERCIAL

## 2025-05-02 NOTE — TELEPHONE ENCOUNTER
Caller: Rand Asencio    Relationship to patient: Self    Best call back number: [phone]    Patient is needing: PATIENT STATES THAT HER PULMONOLOGIST DOESN'T FEEL COMFORTABLE WITH MAKING THE DECISION FOR HER TO RETURN TO WORK AND TOLD HER TO GET WITH PCP TO GET RELEASED TO RETURN TO WORK.     SHE STATES THAT HER FORMER PCP AND SPECIALIST ARE THE ONES WHO TOOK HER OFF WORK DUE TO HER ASTHMA. PLEASE REACH OUT TO PATIENT TO ADVISE.

## 2025-05-07 ENCOUNTER — HOSPITAL ENCOUNTER (OUTPATIENT)
Dept: PET IMAGING | Facility: HOSPITAL | Age: 20
Discharge: HOME | End: 2025-05-07
Admitting: NURSE PRACTITIONER
Payer: COMMERCIAL

## 2025-05-07 DIAGNOSIS — J45.51 SEVERE PERSISTENT ASTHMA WITH ACUTE EXACERBATION: ICD-10-CM

## 2025-05-07 LAB
QT INTERVAL: 360 MS
QTC INTERVAL: 437 MS

## 2025-05-07 PROCEDURE — 71250 CT THORAX DX C-: CPT

## 2025-05-12 NOTE — TELEPHONE ENCOUNTER
Could you look at this message and let me know what to do. Please.  This was sent on 5/2. Thank you

## 2025-05-14 NOTE — TELEPHONE ENCOUNTER
LVM for pt to call back to discuss if needing [place] [employer] paperwork, or if [employer] took care of needs for work.     Advised to call back or can discuss at OV on 5/16.    HUB TO RELAY

## 2025-05-16 ENCOUNTER — OFFICE VISIT (OUTPATIENT)
Dept: FAMILY MEDICINE CLINIC | Facility: CLINIC | Age: 20
End: 2025-05-16
Payer: COMMERCIAL

## 2025-05-16 VITALS
SYSTOLIC BLOOD PRESSURE: 101 MMHG | RESPIRATION RATE: 16 BRPM | TEMPERATURE: 98.3 F | BODY MASS INDEX: 24.34 KG/M2 | HEIGHT: 67 IN | WEIGHT: 155.1 LBS | DIASTOLIC BLOOD PRESSURE: 68 MMHG | OXYGEN SATURATION: 98 % | HEART RATE: 96 BPM

## 2025-05-16 DIAGNOSIS — R00.2 PALPITATIONS: Primary | ICD-10-CM

## 2025-05-16 DIAGNOSIS — F33.2 MDD (MAJOR DEPRESSIVE DISORDER), RECURRENT SEVERE, WITHOUT PSYCHOSIS: Chronic | ICD-10-CM

## 2025-05-16 DIAGNOSIS — J45.51 SEVERE PERSISTENT ASTHMA WITH ACUTE EXACERBATION: ICD-10-CM

## 2025-05-16 NOTE — LETTER
May 16, 2025     Patient: Rand Asencio   YOB: 2005   Date of Visit: 5/16/2025       To Whom It May Concern:    It is my medical opinion that Rand Asencio may return to work in two days and she must be able to take inhalers and prescribed medications, as needed.         Sincerely,        MARIAELENA Kauffman    CC: No Recipients

## 2025-05-16 NOTE — ASSESSMENT & PLAN NOTE
Asthma is stable, but patient insists that it is worsening.  The patient is experiencing frequent daytime asthma symptoms and she is experiencing frequent nighttime asthma symptoms.    Plan: Continue same medication/s without change.   and f/u with pulmonology for medication recommendations and changes.    Discussed medication dosage, use, side effects, and goals of treatment in detail.    Discussed distinction between quick-relief and maintenance control medications.  Discussed monitoring symptoms and use of quick-relief medications and contacting provider early in the course of exacerbations.  Warning signs of respiratory distress were reviewed with the patient.   Discussed result of CT.    Patient Treatment Goals: symptom prevention, minimizing limitation in activity, prevention of exacerbations and use of ER/inpatient care, maintenance of optimal pulmonary function, and minimization of adverse effects of treatment.    Followup in 3 months.

## 2025-05-16 NOTE — ASSESSMENT & PLAN NOTE
Persistent.  Numerous ER visits for shortness of breath and palpitations.  V/s WNL today and at previous visit.  Patient is very tearful at visit today.  Reassured patient that palpations is a normal side effect of using rescue inhaler.  Listened and assessed lung sounds throughout visit, today; good air movement, stable SaO2.  She states that she sees her pulmonologist on a regular basis and tells her asthma is well controlled with listed medications.  [facility] 30 day holter monitor ordered today.  Advised patient that she will hear from [facility] to schedule a time to place holter monitor and that she must wear for 30 days.  Plan to return to office in about 6-12 weeks, to discuss results.  Patient was very friendly at previous visits, today she is very tearful and asking what to do when she can't work due to shortness of breath/asthma attack and palpitations.  Discussed and educated on rescue inhaler safety and use.  HIGHLY encouraged patient to discuss findings with Psych provider, perhaps psych medication adjustments need to be made.

## 2025-05-16 NOTE — ASSESSMENT & PLAN NOTE
Patient's depression is a recurrent episode that is moderate without psychosis. Depression is active but patient insists that mental health is better and worsening.    Plan:   Continue current medication therapy   F/u with psych for further evaluation and care and medication management.    Followup in 3 months.

## 2025-05-16 NOTE — PROGRESS NOTES
"Chief Complaint  Follow-up (3 week f/u)    Subjective        Rand Asencio presents to [facility]  History of Present Illness    Objective   Vital Signs:  /68 (BP Location: Left arm, Patient Position: Sitting, Cuff Size: Adult)   Pulse 96   Temp 98.3 °F (36.8 °C) (Infrared)   Resp 16   Ht 170.2 cm (67\")   Wt 70.4 kg (155 lb 1.6 oz)   SpO2 98%   BMI 24.29 kg/m²   Estimated body mass index is 24.29 kg/m² as calculated from the following:    Height as of this encounter: 170.2 cm (67\").    Weight as of this encounter: 70.4 kg (155 lb 1.6 oz).    BMI is within normal parameters. No other follow-up for BMI required.      Physical Exam   Result Review :                Assessment and Plan   There are no diagnoses linked to this encounter.         Follow Up   No follow-ups on file.  Patient was given instructions and counseling regarding her condition or for health maintenance advice. Please see specific information pulled into the AVS if appropriate.             "

## 2025-05-16 NOTE — PROGRESS NOTES
"Chief Complaint  Follow-up (3 week f/u)    Subjective        Rand Asencio presents to [facility]  History of Present Illness  21 y/o Rand presents to PC office for 3 week f/u.  Shortness of Breath  Chronicity:  Chronic  Onset:  More than 1 year ago  Frequency:  Constantly  Progression since onset:  Coming and going  Fever:  No fever  Associated symptoms: chest congestion, chest pain/tightness, headaches, leg pain, orthopnea, PND, sore throat, sputum production and wheezing    Associated symptoms: no congestion, no fever, no hemoptysis, no leg swelling, no swollen glands, no syncope, no vomiting and no non-productive cough    Aggravating factors:  Emotional upset, occupational exposure, exercise, pollens, weather changes and lying flat  PMH Includes: asthma    PMH Includes: no COPD    Recent oxygen %:  93  Additional Information:  My heartrate has been uncontrolable, continuous body weakness, and shortness of breath      Objective   Vital Signs:  /68 (BP Location: Left arm, Patient Position: Sitting, Cuff Size: Adult)   Pulse 96   Temp 98.3 °F (36.8 °C) (Infrared)   Resp 16   Ht 170.2 cm (67\")   Wt 70.4 kg (155 lb 1.6 oz)   SpO2 98%   BMI 24.29 kg/m²   Estimated body mass index is 24.29 kg/m² as calculated from the following:    Height as of this encounter: 170.2 cm (67\").    Weight as of this encounter: 70.4 kg (155 lb 1.6 oz).    BMI is within normal parameters. No other follow-up for BMI required.      Physical Exam  Constitutional:       General: She is not in acute distress.     Appearance: She is not ill-appearing, toxic-appearing or diaphoretic.   HENT:      Head: Normocephalic and atraumatic.   Cardiovascular:      Rate and Rhythm: Normal rate and regular rhythm.      Pulses: Normal pulses.      Heart sounds: Normal heart sounds.   Pulmonary:      Effort: Pulmonary effort is normal.      Breath sounds: Normal breath sounds. No wheezing, rhonchi or " rales.   Skin:     General: Skin is warm and dry.      Capillary Refill: Capillary refill takes less than 2 seconds.   Neurological:      General: No focal deficit present.      Mental Status: She is alert and oriented to person, place, and time.      Motor: No weakness.      Gait: Gait normal.   Psychiatric:         Mood and Affect: Affect is blunt, flat and tearful.         Speech: Speech normal.         Behavior: Behavior is cooperative.         Thought Content: Thought content normal.         Cognition and Memory: Cognition and memory normal.        Result Review :  The following data was reviewed by: MARIAELENA Kauffman on 05/16/2025:  CMP          4/21/2025    18:11 4/25/2025    09:31 4/27/2025    13:11   CMP   Glucose 83  89  86    BUN 7  8  9    Creatinine 0.60  0.70  0.73    EGFR 132.0  127.2  120.9    Sodium 137  138  136    Potassium 3.6  4.5  3.9    Chloride 102  104  102    Calcium 8.6  9.2  9.3    Total Protein 6.4  7.5     Albumin 3.7  4.4     Globulin 2.7  3.1     Total Bilirubin 0.4  0.3     Alkaline Phosphatase 55  73     AST (SGOT) 26  74     ALT (SGPT) 27  66     Albumin/Globulin Ratio 1.4  1.4     BUN/Creatinine Ratio 11.7  11.4  12.3    Anion Gap 11.2  9.6  11.7      CBC w/diff          4/21/2025    17:04 4/25/2025    09:31 4/27/2025    13:11   CBC w/Diff   WBC 12.87  6.71  8.84    RBC 4.26  4.53  4.69    Hemoglobin 11.6  12.5  12.8    Hematocrit 36.6  38.4  38.5    MCV 85.9  84.8  82.1    MCH 27.2  27.6  27.3    MCHC 31.7  32.6  33.2    RDW 12.7  12.4  12.5    Platelets 251  316  311    Neutrophil Rel % 79.5   70.5    Immature Granulocyte Rel % 0.3   0.3    Lymphocyte Rel % 12.7   19.2    Monocyte Rel % 6.6   8.9    Eosinophil Rel % 0.5   0.6    Basophil Rel % 0.4   0.5      Lipid Panel          4/25/2025    09:31   Lipid Panel   Total Cholesterol 177    Triglycerides 62    HDL Cholesterol 74    VLDL Cholesterol 12    LDL Cholesterol  91    LDL/HDL Ratio 1.22      TSH          4/25/2025     09:31   TSH   TSH 1.860      Most Recent A1C          4/25/2025    09:31   HGBA1C Most Recent   Hemoglobin A1C 5.10      UA          3/24/2025    13:15 4/21/2025    17:20 4/25/2025    09:31   Urinalysis   Specific Gravity, UA 1.008  1.012  1.025    Ketones, UA Negative  Negative  Negative    Blood, UA Negative  Negative  Negative    Leukocytes, UA Negative  Negative  Negative    Nitrite, UA Negative  Negative  Negative        Data reviewed : Radiologic studies reviewed with patient at f/u visit 5/16/25  CT CHEST WO CONTRAST DIAGNOSTIC     Date of Exam: 5/7/2025 11:01 AM EDT     Indication: cough.     Comparison: Chest x-ray April 27, 2025     Technique: Axial CT images were obtained of the chest without contrast administration.  Sagittal and coronal reconstructions were performed.  Automated exposure control and iterative reconstruction methods were used.        Findings:  There is calcification in the subcarinal, right hilar and right lower lobe areas compatible with prior granulomatous exposure. There is some minimal atelectasis in the right middle lobe and lingula. There is no kamini consolidation or obvious pleural   effusions.     There is soft tissue in the anterior mediastinum which could reflect residual thymic tissue.     Lower slices through the upper abdomen reveal no acute abnormality.     Visualized osseous structures do not appear unusual.     IMPRESSION:  Impression:  1.Evidence for prior granulomatous exposure.  2.Minimal atelectasis right middle lobe and lingula.  3.Soft tissue in the anterior mediastinum that could reflect residual thymic tissue.           Electronically Signed: Darrell Strickland MD    5/12/2025 1:36 PM EDT    Workstation ID: [id number]         Assessment and Plan   Diagnoses and all orders for this visit:    1. Palpitations (Primary)  Assessment & Plan:  Persistent.  Numerous ER visits for shortness of breath and palpitations.  V/s WNL today and at previous visit.  Patient is very tearful  at visit today.  Reassured patient that palpations is a normal side effect of using rescue inhaler.  Listened and assessed lung sounds throughout visit, today; good air movement, stable SaO2.  She states that she sees her pulmonologist on a regular basis and tells her asthma is well controlled with listed medications.  OT 30 day holter monitor ordered today.  Advised patient that she will hear from [facility] to schedule a time to place holter monitor and that she must wear for 30 days.  Plan to return to office in about 6-12 weeks, to discuss results.  Patient was very friendly at previous visits, today she is very tearful and asking what to do when she can't work due to shortness of breath/asthma attack and palpitations.  Discussed and educated on rescue inhaler safety and use.  HIGHLY encouraged patient to discuss findings with Psych provider, perhaps psych medication adjustments need to be made.    Orders:  -     Cardiac Event Monitor (LEONILA) or Mobile Cardiac Outpatient Telemetry (MCT); Future    2. Severe persistent asthma with acute exacerbation  Assessment & Plan:  Asthma is stable, but patient insists that it is worsening.  The patient is experiencing frequent daytime asthma symptoms and she is experiencing frequent nighttime asthma symptoms.    Plan: Continue same medication/s without change.   and f/u with pulmonology for medication recommendations and changes.    Discussed medication dosage, use, side effects, and goals of treatment in detail.    Discussed distinction between quick-relief and maintenance control medications.  Discussed monitoring symptoms and use of quick-relief medications and contacting provider early in the course of exacerbations.  Warning signs of respiratory distress were reviewed with the patient.   Discussed result of CT.    Patient Treatment Goals: symptom prevention, minimizing limitation in activity, prevention of exacerbations and use of ER/inpatient care, maintenance  of optimal pulmonary function, and minimization of adverse effects of treatment.    Followup in 3 months.              3. MDD (major depressive disorder), recurrent severe, without psychosis  Assessment & Plan:  Patient's depression is a recurrent episode that is moderate without psychosis. Depression is active but patient insists that mental health is better and worsening.    Plan:   Continue current medication therapy   F/u with psych for further evaluation and care and medication management.    Followup in 3 months.              I spent 40 minutes caring for Rand on this date of service. This time includes time spent by me in the following activities:preparing for the visit, reviewing tests, obtaining and/or reviewing a separately obtained history, performing a medically appropriate examination and/or evaluation , counseling and educating the patient/family/caregiver, ordering medications, tests, or procedures, documenting information in the medical record, and independently interpreting results and communicating that information with the patient/family/caregiver  Follow Up   Return in about 3 months (around 8/16/2025) for Next scheduled follow up.  Patient was given instructions and counseling regarding her condition or for health maintenance advice. Please see specific information pulled into the AVS if appropriate.

## 2025-06-02 ENCOUNTER — HOSPITAL ENCOUNTER (EMERGENCY)
Facility: HOSPITAL | Age: 20
Discharge: HOME | End: 2025-06-02
Attending: EMERGENCY MEDICINE | Admitting: EMERGENCY MEDICINE
Payer: COMMERCIAL

## 2025-06-02 ENCOUNTER — APPOINTMENT (OUTPATIENT)
Dept: MRI IMAGING | Facility: HOSPITAL | Age: 20
End: 2025-06-02
Payer: COMMERCIAL

## 2025-06-02 ENCOUNTER — APPOINTMENT (OUTPATIENT)
Dept: GENERAL RADIOLOGY | Facility: HOSPITAL | Age: 20
End: 2025-06-02
Payer: COMMERCIAL

## 2025-06-02 VITALS
DIASTOLIC BLOOD PRESSURE: 73 MMHG | HEART RATE: 83 BPM | SYSTOLIC BLOOD PRESSURE: 102 MMHG | WEIGHT: 155.2 LBS | RESPIRATION RATE: 20 BRPM | TEMPERATURE: 98.4 F | OXYGEN SATURATION: 100 % | HEIGHT: 67 IN | BODY MASS INDEX: 24.36 KG/M2

## 2025-06-02 DIAGNOSIS — J45.909 MILD ASTHMA WITHOUT COMPLICATION, UNSPECIFIED WHETHER PERSISTENT: ICD-10-CM

## 2025-06-02 DIAGNOSIS — H53.9 VISUAL DISTURBANCE: Primary | ICD-10-CM

## 2025-06-02 LAB
ANION GAP SERPL CALCULATED.3IONS-SCNC: 14.9 MMOL/L (ref 5–15)
B PARAPERT DNA SPEC QL NAA+PROBE: NOT DETECTED
B PERT DNA SPEC QL NAA+PROBE: NOT DETECTED
BASOPHILS # BLD AUTO: 0.06 10*3/MM3 (ref 0–0.2)
BASOPHILS NFR BLD AUTO: 0.3 % (ref 0–1.5)
BUN SERPL-MCNC: 7 MG/DL (ref 6–20)
BUN/CREAT SERPL: 9.5 (ref 7–25)
C PNEUM DNA NPH QL NAA+NON-PROBE: NOT DETECTED
CALCIUM SPEC-SCNC: 9.1 MG/DL (ref 8.6–10.5)
CHLORIDE SERPL-SCNC: 100 MMOL/L (ref 98–107)
CO2 SERPL-SCNC: 21.1 MMOL/L (ref 22–29)
CREAT SERPL-MCNC: 0.74 MG/DL (ref 0.57–1)
DEPRECATED RDW RBC AUTO: 39.6 FL (ref 37–54)
EGFRCR SERPLBLD CKD-EPI 2021: 119 ML/MIN/1.73
EOSINOPHIL # BLD AUTO: 0.07 10*3/MM3 (ref 0–0.4)
EOSINOPHIL NFR BLD AUTO: 0.4 % (ref 0.3–6.2)
ERYTHROCYTE [DISTWIDTH] IN BLOOD BY AUTOMATED COUNT: 13 % (ref 12.3–15.4)
ERYTHROCYTE [SEDIMENTATION RATE] IN BLOOD: 5 MM/HR (ref 0–20)
FLUAV SUBTYP SPEC NAA+PROBE: NOT DETECTED
FLUBV RNA ISLT QL NAA+PROBE: NOT DETECTED
GLUCOSE SERPL-MCNC: 88 MG/DL (ref 65–99)
HADV DNA SPEC NAA+PROBE: NOT DETECTED
HCOV 229E RNA SPEC QL NAA+PROBE: NOT DETECTED
HCOV HKU1 RNA SPEC QL NAA+PROBE: NOT DETECTED
HCOV NL63 RNA SPEC QL NAA+PROBE: NOT DETECTED
HCOV OC43 RNA SPEC QL NAA+PROBE: NOT DETECTED
HCT VFR BLD AUTO: 40.1 % (ref 34–46.6)
HGB BLD-MCNC: 13.3 G/DL (ref 12–15.9)
HMPV RNA NPH QL NAA+NON-PROBE: NOT DETECTED
HPIV1 RNA ISLT QL NAA+PROBE: NOT DETECTED
HPIV2 RNA SPEC QL NAA+PROBE: NOT DETECTED
HPIV3 RNA NPH QL NAA+PROBE: NOT DETECTED
HPIV4 P GENE NPH QL NAA+PROBE: NOT DETECTED
IMM GRANULOCYTES # BLD AUTO: 0.15 10*3/MM3 (ref 0–0.05)
IMM GRANULOCYTES NFR BLD AUTO: 0.8 % (ref 0–0.5)
LYMPHOCYTES # BLD AUTO: 3.3 10*3/MM3 (ref 0.7–3.1)
LYMPHOCYTES NFR BLD AUTO: 17.4 % (ref 19.6–45.3)
M PNEUMO IGG SER IA-ACNC: NOT DETECTED
MAGNESIUM SERPL-MCNC: 2.2 MG/DL (ref 1.7–2.2)
MCH RBC QN AUTO: 27.7 PG (ref 26.6–33)
MCHC RBC AUTO-ENTMCNC: 33.2 G/DL (ref 31.5–35.7)
MCV RBC AUTO: 83.4 FL (ref 79–97)
MONOCYTES # BLD AUTO: 1.13 10*3/MM3 (ref 0.1–0.9)
MONOCYTES NFR BLD AUTO: 6 % (ref 5–12)
NEUTROPHILS NFR BLD AUTO: 14.28 10*3/MM3 (ref 1.7–7)
NEUTROPHILS NFR BLD AUTO: 75.1 % (ref 42.7–76)
NRBC BLD AUTO-RTO: 0 /100 WBC (ref 0–0.2)
PLATELET # BLD AUTO: 360 10*3/MM3 (ref 140–450)
PMV BLD AUTO: 10.2 FL (ref 6–12)
POTASSIUM SERPL-SCNC: 3.3 MMOL/L (ref 3.5–5.2)
RBC # BLD AUTO: 4.81 10*6/MM3 (ref 3.77–5.28)
RHINOVIRUS RNA SPEC NAA+PROBE: NOT DETECTED
RSV RNA NPH QL NAA+NON-PROBE: NOT DETECTED
SARS-COV-2 RNA RESP QL NAA+PROBE: NOT DETECTED
SODIUM SERPL-SCNC: 136 MMOL/L (ref 136–145)
TSH SERPL DL<=0.05 MIU/L-ACNC: 2.01 UIU/ML (ref 0.27–4.2)
WBC NRBC COR # BLD AUTO: 18.99 10*3/MM3 (ref 3.4–10.8)

## 2025-06-02 PROCEDURE — 99284 EMERGENCY DEPT VISIT MOD MDM: CPT

## 2025-06-02 PROCEDURE — 70551 MRI BRAIN STEM W/O DYE: CPT

## 2025-06-02 PROCEDURE — 85025 COMPLETE CBC W/AUTO DIFF WBC: CPT | Performed by: EMERGENCY MEDICINE

## 2025-06-02 PROCEDURE — 71045 X-RAY EXAM CHEST 1 VIEW: CPT

## 2025-06-02 PROCEDURE — 83735 ASSAY OF MAGNESIUM: CPT | Performed by: EMERGENCY MEDICINE

## 2025-06-02 PROCEDURE — 0202U NFCT DS 22 TRGT SARS-COV-2: CPT | Performed by: NURSE PRACTITIONER

## 2025-06-02 PROCEDURE — 85652 RBC SED RATE AUTOMATED: CPT | Performed by: EMERGENCY MEDICINE

## 2025-06-02 PROCEDURE — 84443 ASSAY THYROID STIM HORMONE: CPT | Performed by: EMERGENCY MEDICINE

## 2025-06-02 PROCEDURE — 80048 BASIC METABOLIC PNL TOTAL CA: CPT | Performed by: EMERGENCY MEDICINE

## 2025-06-02 RX ORDER — SODIUM CHLORIDE 0.9 % (FLUSH) 0.9 %
10 SYRINGE (ML) INJECTION AS NEEDED
Status: DISCONTINUED | OUTPATIENT
Start: 2025-06-02 | End: 2025-06-03 | Stop reason: HOSPADM

## 2025-06-02 RX ORDER — POTASSIUM CHLORIDE 1500 MG/1
40 TABLET, EXTENDED RELEASE ORAL ONCE
Status: COMPLETED | OUTPATIENT
Start: 2025-06-02 | End: 2025-06-02

## 2025-06-02 RX ADMIN — POTASSIUM CHLORIDE 40 MEQ: 1500 TABLET, EXTENDED RELEASE ORAL at 22:47

## 2025-06-02 NOTE — ED PROVIDER NOTES
"Subjective   Provider in Triage Note  Reports headache and \"asthma attack\" this evening. Used an inhaler with improvement. Presented to the er \"because I still feel out of it.\"   +thc use. Denies etoh use. No seizure hx.    Due to significant overcrowding in the emergency department patient was initially seen and evaluated in triage.  Provider in triage recommended patient placement in the treatment area to initiate therapy and movement to an ER bed as soon as possible.   Orders placed; medications will be deferred to main provider per protocol.        History of Present Illness  Chief complaint I had an asthma attack and I feel numb in my head and the visions not normal.    History of present illness 20-year-old female recently seen at another facility for asthma who is finishing up a steroid pack who was at work today and she started to feel like she was wheezing having an asthma attack she used her inhaler and then she started to feel numb in her head and had blurred vision.  There was no loss of vision.  She denies any speech difficulty chest pain neck arm jaw pain she states she is no longer short of breath.  She has no weakness to extremities she is able to walk and function and talk normally otherwise.  She still feels out of it and still feels like her vision is not normal blurred bilaterally.  Denies any recent injury illness flus viruses trauma chiropractic manipulations or ill exposures or foreign travels leg pain or swelling      Review of Systems   Constitutional:  Negative for chills and fever.   Eyes:  Positive for visual disturbance. Negative for photophobia.   Respiratory:  Positive for shortness of breath. Negative for chest tightness.    Cardiovascular:  Negative for chest pain, palpitations and leg swelling.   Gastrointestinal:  Negative for abdominal pain and vomiting.   Skin:  Negative for rash.   Neurological:  Positive for numbness. Negative for facial asymmetry and speech difficulty. "   Psychiatric/Behavioral:  Negative for confusion.        Past Medical History:   Diagnosis Date    Allergic 2016    Last allergy test was with  at [facility] in [place] in 2024    Allergies     Anxiety 2024    Arthritis 2021    Due to multiple ankle sprains/surgery    Asthma     Depression 2025    Due to suicide attempt    Eating disorder 2019    Due to wrestling    Traumatic closed nondisplaced fracture of distal fibula, right, initial encounter 05/03/2021    Visual impairment 2019    Astigmatism       Allergies   Allergen Reactions    Fluticasone Furoate-Vilanterol Seizure       Past Surgical History:   Procedure Laterality Date    LEG TENDON REPAIR Right 01/24/2022    Procedure: TENDON REPAIR FOOT/TOE -posterior tibial tendon repair, Tarsal Tunnel decompression, biopsy of soft tissue mass;  Surgeon: DAMION Hammer DPM;  Location: [facility] MAIN OR;  Service: Podiatry;  Laterality: Right;    LYMPH NODE BIOPSY Right     neck       Family History   Problem Relation Age of Onset    Miscarriages / Stillbirths Mother     No Known Problems Father     Diabetes Maternal Grandmother     Cancer Maternal Grandmother     Hypertension Maternal Grandmother     Heart disease Maternal Grandmother     Vision loss Maternal Grandmother     Heart disease Maternal Grandfather     Heart attack Maternal Grandfather     Hypertension Maternal Grandfather        Social History     Socioeconomic History    Marital status: Single   Tobacco Use    Smoking status: Never     Passive exposure: Never    Smokeless tobacco: Never   Vaping Use    Vaping status: Never Used   Substance and Sexual Activity    Alcohol use: Never    Drug use: Never    Sexual activity: Yes     Partners: Male     Birth control/protection: Condom     Prior to Admission medications    Medication Sig Start Date End Date Taking? Authorizing Provider   albuterol (ACCUNEB) 0.63 MG/3ML nebulizer solution Take 3 mL by nebulization Every 6 (Six) Hours As  Needed for Wheezing. 11/8/24   Marissa Vogel PA   budesonide (Pulmicort) 0.5 MG/2ML nebulizer solution Take 2 mL by nebulization 2 (Two) Times a Day. Dispense as 1 box of unit doses 11/20/24   Eric Pierre MD   cetirizine (zyrTEC) 10 MG tablet Take 1 tablet by mouth Daily. 4/1/25   Екатерина Alcocer MD   fluticasone (FLONASE) 50 MCG/ACT nasal spray Administer 2 sprays into the nostril(s) as directed by provider Daily.    Екатерина Alcocer MD   fluvoxaMINE (LUVOX) 50 MG tablet Take 1 tablet by mouth Every Night. 4/3/25   Екатерина Alcocer MD   hydrOXYzine pamoate (VISTARIL) 50 MG capsule Take 1 capsule by mouth 4 (Four) Times a Day As Needed. 3/14/25   Екатерина Alcocer MD   ipratropium-albuterol (COMBIVENT RESPIMAT)  MCG/ACT inhaler Inhale 1 puff 4 (Four) Times a Day As Needed for Wheezing.    Екатерина Alcocer MD   ipratropium-albuterol (DUO-NEB) 0.5-2.5 mg/3 ml nebulizer Take 3 mL by nebulization Every 4 (Four) Hours As Needed for Wheezing. 11/20/24   Eric Pierre MD   lamoTRIgine (LaMICtal) 100 MG tablet Take 0.5 tablets by mouth Every 12 (Twelve) Hours. 4/3/25   Екатерина Alcocer MD   montelukast (SINGULAIR) 10 MG tablet Take 1 tablet by mouth Every Night. 11/20/24   Eric Pierre MD   Tezspire 210 MG/1.91ML solution auto-injector  4/23/25   Екатерина Alcocer MD   Vraylar 1.5 MG capsule capsule Take 1 capsule by mouth Daily. 4/11/25   Екатерина Alcocer MD          Objective   Physical Exam  Constitutional is a 20-year-old female awake alert no acute distress triage vital signs reviewed.  HEENT extraocular muscles are intact pupils equal round react there is no photophobia there is no papilledema no conjunctival erythema.  Mouth is clear no exudate abscess stridor drooling neck is supple no adenopathy no JV no bruits no meningeal signs.  Lungs clear no retraction no use of accessory heart was regular without murmur rub abdomen soft nontender good bowel sounds  extremities pulses equal upper and lower extremities no edema no cords no Homans' sign no evidence of DVT skin warm dry without rashes or cellulitic changes neurologic awake alert orientated x 4 no facial asymmetry speech normal all extraocular muscles intact pupils equal round reaction vision is intact she can read print appropriately peripheral vision intact to confrontation no extinction no drift the arms or legs normal finger-to-nose no truncal ataxia she walks without difficulty NIH is 0.  Procedures           ED Course      Results for orders placed or performed during the hospital encounter of 06/02/25   Respiratory Panel PCR w/COVID-19(SARS-CoV-2) SANTOSH/YUSEF/NEGRO/PAD/COR/GERDA In-House, NP Swab in UTM/VTM, 2 HR TAT - Swab, Nasopharynx    Collection Time: 06/02/25  7:31 PM    Specimen: Nasopharynx; Swab   Result Value Ref Range    ADENOVIRUS, PCR Not Detected Not Detected    Coronavirus 229E Not Detected Not Detected    Coronavirus HKU1 Not Detected Not Detected    Coronavirus NL63 Not Detected Not Detected    Coronavirus OC43 Not Detected Not Detected    COVID19 Not Detected Not Detected - Ref. Range    Human Metapneumovirus Not Detected Not Detected    Human Rhinovirus/Enterovirus Not Detected Not Detected    Influenza A PCR Not Detected Not Detected    Influenza B PCR Not Detected Not Detected    Parainfluenza Virus 1 Not Detected Not Detected    Parainfluenza Virus 2 Not Detected Not Detected    Parainfluenza Virus 3 Not Detected Not Detected    Parainfluenza Virus 4 Not Detected Not Detected    RSV, PCR Not Detected Not Detected    Bordetella pertussis pcr Not Detected Not Detected    Bordetella parapertussis PCR Not Detected Not Detected    Chlamydophila pneumoniae PCR Not Detected Not Detected    Mycoplasma pneumo by PCR Not Detected Not Detected   Basic Metabolic Panel    Collection Time: 06/02/25  8:52 PM    Specimen: Blood   Result Value Ref Range    Glucose 88 65 - 99 mg/dL    BUN 7.0 6.0 - 20.0 mg/dL     Creatinine 0.74 0.57 - 1.00 mg/dL    Sodium 136 136 - 145 mmol/L    Potassium 3.3 (L) 3.5 - 5.2 mmol/L    Chloride 100 98 - 107 mmol/L    CO2 21.1 (L) 22.0 - 29.0 mmol/L    Calcium 9.1 8.6 - 10.5 mg/dL    BUN/Creatinine Ratio 9.5 7.0 - 25.0    Anion Gap 14.9 5.0 - 15.0 mmol/L    eGFR 119.0 >60.0 mL/min/1.73   TSH Rfx On Abnormal To Free T4    Collection Time: 06/02/25  8:52 PM    Specimen: Blood   Result Value Ref Range    TSH 2.010 0.270 - 4.200 uIU/mL   Magnesium    Collection Time: 06/02/25  8:52 PM    Specimen: Blood   Result Value Ref Range    Magnesium 2.2 1.7 - 2.2 mg/dL   CBC Auto Differential    Collection Time: 06/02/25  8:52 PM    Specimen: Blood   Result Value Ref Range    WBC 18.99 (H) 3.40 - 10.80 10*3/mm3    RBC 4.81 3.77 - 5.28 10*6/mm3    Hemoglobin 13.3 12.0 - 15.9 g/dL    Hematocrit 40.1 34.0 - 46.6 %    MCV 83.4 79.0 - 97.0 fL    MCH 27.7 26.6 - 33.0 pg    MCHC 33.2 31.5 - 35.7 g/dL    RDW 13.0 12.3 - 15.4 %    RDW-SD 39.6 37.0 - 54.0 fl    MPV 10.2 6.0 - 12.0 fL    Platelets 360 140 - 450 10*3/mm3    Neutrophil % 75.1 42.7 - 76.0 %    Lymphocyte % 17.4 (L) 19.6 - 45.3 %    Monocyte % 6.0 5.0 - 12.0 %    Eosinophil % 0.4 0.3 - 6.2 %    Basophil % 0.3 0.0 - 1.5 %    Immature Grans % 0.8 (H) 0.0 - 0.5 %    Neutrophils, Absolute 14.28 (H) 1.70 - 7.00 10*3/mm3    Lymphocytes, Absolute 3.30 (H) 0.70 - 3.10 10*3/mm3    Monocytes, Absolute 1.13 (H) 0.10 - 0.90 10*3/mm3    Eosinophils, Absolute 0.07 0.00 - 0.40 10*3/mm3    Basophils, Absolute 0.06 0.00 - 0.20 10*3/mm3    Immature Grans, Absolute 0.15 (H) 0.00 - 0.05 10*3/mm3    nRBC 0.0 0.0 - 0.2 /100 WBC   Sedimentation Rate    Collection Time: 06/02/25  8:52 PM    Specimen: Blood   Result Value Ref Range    Sed Rate 5 0 - 20 mm/hr     MRI Brain Without Contrast  Result Date: 6/2/2025  Impression: No acute intracranial process identified. Electronically Signed: Shashank Donald MD  6/2/2025 9:54 PM EDT  Workstation ID: [id number]    XR Chest AP  Result  Date: 6/2/2025  Impression: No acute cardiopulmonary process. Electronically Signed: Shashank Donald MD  6/2/2025 8:44 PM EDT  Workstation ID: [id number]    Medications   potassium chloride (KLOR-CON M20) CR tablet 40 mEq (40 mEq Oral Given 6/2/25 2470)                                                        Medical Decision Making  Medical decision making.  Patient had respiratory panel which was negative.  She was placed on the monitor my review of sinus rhythm.  An IV established.  The patient underwent a chest x-ray my independent review no evidence of failure pneumonia or pneumothorax radiology review unremarkable.  Labs obtained by independent review basic metabolic profile unremarkable other than potassium 3.3 TSH magnesium normal sed rate normal CBC white count is 18.9.  Otherwise unremarkable.  Patient given potassium 40 mill equivalents p.o.  MRI of the brain obtained my independent review I do not see any tumors masses or acute findings on CT acute stroke radiology reviewed no acute findings were noted.  The patient on repeat exam is resting company no distress sats were 100% on room air respiratory of 18 lungs clear heart was regular without murmur she is awake alert follows commands no facial asymmetry speech normal there is no focal findings all extraocular muscles are intact there is no photophobia or papilledema.  I do not see any evidence suggest an acute stroke I do not see any evidence of an acute carotid or vertebral dissection or acute posterior infarct do not see any evidence that would suggest an acute meningitis or encephalitis or acute optic ischemia or any evidence that suggest optic neuritis or any glaucoma or acute intraocular issue.  I do not see any evidence that suggest acute cardiac rhythm myocardial infarction DVT pulm embolism or dissection pericardial effusion or sepsis based on my history and physical and clinical findings at this time.  The patient white count is about 18,000 but  she is on a steroid pack currently.  And I suspect is related to that no evidence of pneumonia.  She denies any urinary complaints.  We talked about the findings and what to return for and she was understanding she was discharged home for outpatient management and follow-up.    Problems Addressed:  Mild asthma without complication, unspecified whether persistent: complicated acute illness or injury  Visual disturbance: complicated acute illness or injury    Amount and/or Complexity of Data Reviewed  Labs: ordered. Decision-making details documented in ED Course.  Radiology: ordered and independent interpretation performed. Decision-making details documented in ED Course.    Risk  Prescription drug management.        Final diagnoses:   Visual disturbance   Mild asthma without complication, unspecified whether persistent       ED Disposition  ED Disposition       ED Disposition   Discharge    Condition   Stable    Comment   --               Aida Reyes S, APRN  [address]  [address]  [place] [address]  [phone]    In 1 day      Rosendo Thayer MD  [address] [facility] [address]  [place] [address]  [phone]    In 1 day           Medication List      No changes were made to your prescriptions during this visit.            Lorenzo Lieberman MD  06/03/25 [id number]

## 2025-06-03 NOTE — DISCHARGE INSTRUCTIONS
Follow-up with your eye doctor or the eye doctor listed above tomorrow.  Follow-up primary care doctor this week.  Return for severe headache loss of vision paralysis to one-sided any other increasing shortness of breath coughing up blood chest pain altered mental status dizziness passing out leg pain or swelling or any other new or worsening problems or concerns return immediately to the ER

## 2025-06-05 ENCOUNTER — APPOINTMENT (OUTPATIENT)
Dept: CT IMAGING | Facility: HOSPITAL | Age: 20
End: 2025-06-05
Payer: COMMERCIAL

## 2025-06-05 ENCOUNTER — HOSPITAL ENCOUNTER (OUTPATIENT)
Facility: HOSPITAL | Age: 20
Setting detail: OBSERVATION
Discharge: HOME | End: 2025-06-06
Attending: EMERGENCY MEDICINE | Admitting: EMERGENCY MEDICINE
Payer: COMMERCIAL

## 2025-06-05 ENCOUNTER — APPOINTMENT (OUTPATIENT)
Dept: GENERAL RADIOLOGY | Facility: HOSPITAL | Age: 20
End: 2025-06-05
Payer: COMMERCIAL

## 2025-06-05 DIAGNOSIS — R41.82 ALTERED MENTAL STATUS, UNSPECIFIED ALTERED MENTAL STATUS TYPE: Primary | ICD-10-CM

## 2025-06-05 LAB
ALBUMIN SERPL-MCNC: 4.7 G/DL (ref 3.5–5.2)
ALBUMIN/GLOB SERPL: 1.7 G/DL
ALP SERPL-CCNC: 58 U/L (ref 39–117)
ALT SERPL W P-5'-P-CCNC: 28 U/L (ref 1–33)
AMPHET+METHAMPHET UR QL: NEGATIVE
AMPHETAMINES UR QL: NEGATIVE
ANION GAP SERPL CALCULATED.3IONS-SCNC: 10.9 MMOL/L (ref 5–15)
APAP SERPL-MCNC: <5 MCG/ML (ref 0–30)
AST SERPL-CCNC: 29 U/L (ref 1–32)
BARBITURATES UR QL SCN: NEGATIVE
BASOPHILS # BLD AUTO: 0.02 10*3/MM3 (ref 0–0.2)
BASOPHILS NFR BLD AUTO: 0.3 % (ref 0–1.5)
BENZODIAZ UR QL SCN: NEGATIVE
BILIRUB SERPL-MCNC: 0.6 MG/DL (ref 0–1.2)
BUN SERPL-MCNC: 7.7 MG/DL (ref 6–20)
BUN/CREAT SERPL: 10.5 (ref 7–25)
BUPRENORPHINE SERPL-MCNC: NEGATIVE NG/ML
CALCIUM SPEC-SCNC: 9.3 MG/DL (ref 8.6–10.5)
CANNABINOIDS SERPL QL: POSITIVE
CHLORIDE SERPL-SCNC: 104 MMOL/L (ref 98–107)
CK SERPL-CCNC: 51 U/L (ref 20–180)
CO2 SERPL-SCNC: 21.1 MMOL/L (ref 22–29)
COCAINE UR QL: NEGATIVE
CREAT SERPL-MCNC: 0.73 MG/DL (ref 0.57–1)
D-LACTATE SERPL-SCNC: <0.3 MMOL/L (ref 0.3–2)
DEPRECATED RDW RBC AUTO: 38.5 FL (ref 37–54)
EGFRCR SERPLBLD CKD-EPI 2021: 120.9 ML/MIN/1.73
EOSINOPHIL # BLD AUTO: 0.06 10*3/MM3 (ref 0–0.4)
EOSINOPHIL NFR BLD AUTO: 0.9 % (ref 0.3–6.2)
ERYTHROCYTE [DISTWIDTH] IN BLOOD BY AUTOMATED COUNT: 12.8 % (ref 12.3–15.4)
ETHANOL UR QL: <0.01 %
GLOBULIN UR ELPH-MCNC: 2.8 GM/DL
GLUCOSE BLDC GLUCOMTR-MCNC: 108 MG/DL (ref 70–105)
GLUCOSE SERPL-MCNC: 112 MG/DL (ref 65–99)
HCG SERPL QL: NEGATIVE
HCT VFR BLD AUTO: 39.9 % (ref 34–46.6)
HGB BLD-MCNC: 13 G/DL (ref 12–15.9)
IMM GRANULOCYTES # BLD AUTO: 0.03 10*3/MM3 (ref 0–0.05)
IMM GRANULOCYTES NFR BLD AUTO: 0.4 % (ref 0–0.5)
LYMPHOCYTES # BLD AUTO: 1.45 10*3/MM3 (ref 0.7–3.1)
LYMPHOCYTES NFR BLD AUTO: 21.4 % (ref 19.6–45.3)
MCH RBC QN AUTO: 26.9 PG (ref 26.6–33)
MCHC RBC AUTO-ENTMCNC: 32.6 G/DL (ref 31.5–35.7)
MCV RBC AUTO: 82.4 FL (ref 79–97)
METHADONE UR QL SCN: NEGATIVE
MONOCYTES # BLD AUTO: 0.62 10*3/MM3 (ref 0.1–0.9)
MONOCYTES NFR BLD AUTO: 9.2 % (ref 5–12)
NEUTROPHILS NFR BLD AUTO: 4.59 10*3/MM3 (ref 1.7–7)
NEUTROPHILS NFR BLD AUTO: 67.8 % (ref 42.7–76)
NRBC BLD AUTO-RTO: 0 /100 WBC (ref 0–0.2)
OPIATES UR QL: NEGATIVE
OXYCODONE UR QL SCN: NEGATIVE
PCP UR QL SCN: NEGATIVE
PLATELET # BLD AUTO: 340 10*3/MM3 (ref 140–450)
PMV BLD AUTO: 9.8 FL (ref 6–12)
POTASSIUM SERPL-SCNC: 4 MMOL/L (ref 3.5–5.2)
PROT SERPL-MCNC: 7.5 G/DL (ref 6–8.5)
RBC # BLD AUTO: 4.84 10*6/MM3 (ref 3.77–5.28)
SALICYLATES SERPL-MCNC: <0.5 MG/DL
SODIUM SERPL-SCNC: 136 MMOL/L (ref 136–145)
TRICYCLICS UR QL SCN: NEGATIVE
TSH SERPL DL<=0.05 MIU/L-ACNC: 1.17 UIU/ML (ref 0.27–4.2)
WBC NRBC COR # BLD AUTO: 6.77 10*3/MM3 (ref 3.4–10.8)

## 2025-06-05 PROCEDURE — 80050 GENERAL HEALTH PANEL: CPT | Performed by: EMERGENCY MEDICINE

## 2025-06-05 PROCEDURE — 80143 DRUG ASSAY ACETAMINOPHEN: CPT | Performed by: EMERGENCY MEDICINE

## 2025-06-05 PROCEDURE — 93005 ELECTROCARDIOGRAM TRACING: CPT | Performed by: EMERGENCY MEDICINE

## 2025-06-05 PROCEDURE — 82550 ASSAY OF CK (CPK): CPT | Performed by: EMERGENCY MEDICINE

## 2025-06-05 PROCEDURE — 83605 ASSAY OF LACTIC ACID: CPT

## 2025-06-05 PROCEDURE — 84703 CHORIONIC GONADOTROPIN ASSAY: CPT | Performed by: EMERGENCY MEDICINE

## 2025-06-05 PROCEDURE — 80179 DRUG ASSAY SALICYLATE: CPT | Performed by: EMERGENCY MEDICINE

## 2025-06-05 PROCEDURE — 81001 URINALYSIS AUTO W/SCOPE: CPT | Performed by: NURSE PRACTITIONER

## 2025-06-05 PROCEDURE — 99285 EMERGENCY DEPT VISIT HI MDM: CPT

## 2025-06-05 PROCEDURE — 25810000003 SODIUM CHLORIDE 0.9 % SOLUTION: Performed by: EMERGENCY MEDICINE

## 2025-06-05 PROCEDURE — 71045 X-RAY EXAM CHEST 1 VIEW: CPT

## 2025-06-05 PROCEDURE — 80306 DRUG TEST PRSMV INSTRMNT: CPT | Performed by: EMERGENCY MEDICINE

## 2025-06-05 PROCEDURE — G0378 HOSPITAL OBSERVATION PER HR: HCPCS

## 2025-06-05 PROCEDURE — 82948 REAGENT STRIP/BLOOD GLUCOSE: CPT

## 2025-06-05 PROCEDURE — 93005 ELECTROCARDIOGRAM TRACING: CPT

## 2025-06-05 PROCEDURE — 82077 ASSAY SPEC XCP UR&BREATH IA: CPT | Performed by: EMERGENCY MEDICINE

## 2025-06-05 PROCEDURE — 70450 CT HEAD/BRAIN W/O DYE: CPT

## 2025-06-05 RX ORDER — AMOXICILLIN 250 MG
2 CAPSULE ORAL 2 TIMES DAILY PRN
Status: DISCONTINUED | OUTPATIENT
Start: 2025-06-05 | End: 2025-06-06 | Stop reason: HOSPADM

## 2025-06-05 RX ORDER — SODIUM CHLORIDE 0.9 % (FLUSH) 0.9 %
10 SYRINGE (ML) INJECTION EVERY 12 HOURS SCHEDULED
Status: DISCONTINUED | OUTPATIENT
Start: 2025-06-05 | End: 2025-06-06 | Stop reason: HOSPADM

## 2025-06-05 RX ORDER — ACETAMINOPHEN 160 MG/5ML
650 SOLUTION ORAL EVERY 4 HOURS PRN
Status: DISCONTINUED | OUTPATIENT
Start: 2025-06-05 | End: 2025-06-06 | Stop reason: HOSPADM

## 2025-06-05 RX ORDER — BISACODYL 5 MG/1
5 TABLET, DELAYED RELEASE ORAL DAILY PRN
Status: DISCONTINUED | OUTPATIENT
Start: 2025-06-05 | End: 2025-06-06 | Stop reason: HOSPADM

## 2025-06-05 RX ORDER — TIOTROPIUM BROMIDE AND OLODATEROL 3.124; 2.736 UG/1; UG/1
1 SPRAY, METERED RESPIRATORY (INHALATION)
COMMUNITY

## 2025-06-05 RX ORDER — ALUMINA, MAGNESIA, AND SIMETHICONE 2400; 2400; 240 MG/30ML; MG/30ML; MG/30ML
15 SUSPENSION ORAL EVERY 6 HOURS PRN
Status: DISCONTINUED | OUTPATIENT
Start: 2025-06-05 | End: 2025-06-06 | Stop reason: HOSPADM

## 2025-06-05 RX ORDER — ACETAMINOPHEN 650 MG/1
650 SUPPOSITORY RECTAL EVERY 4 HOURS PRN
Status: DISCONTINUED | OUTPATIENT
Start: 2025-06-05 | End: 2025-06-06 | Stop reason: HOSPADM

## 2025-06-05 RX ORDER — ONDANSETRON 2 MG/ML
4 INJECTION INTRAMUSCULAR; INTRAVENOUS EVERY 6 HOURS PRN
Status: DISCONTINUED | OUTPATIENT
Start: 2025-06-05 | End: 2025-06-06 | Stop reason: HOSPADM

## 2025-06-05 RX ORDER — PREDNISONE 50 MG/1
50 TABLET ORAL DAILY
COMMUNITY

## 2025-06-05 RX ORDER — SODIUM CHLORIDE 0.9 % (FLUSH) 0.9 %
10 SYRINGE (ML) INJECTION AS NEEDED
Status: DISCONTINUED | OUTPATIENT
Start: 2025-06-05 | End: 2025-06-06 | Stop reason: HOSPADM

## 2025-06-05 RX ORDER — ACETAMINOPHEN 325 MG/1
650 TABLET ORAL EVERY 4 HOURS PRN
Status: DISCONTINUED | OUTPATIENT
Start: 2025-06-05 | End: 2025-06-06 | Stop reason: HOSPADM

## 2025-06-05 RX ORDER — BISACODYL 10 MG
10 SUPPOSITORY, RECTAL RECTAL DAILY PRN
Status: DISCONTINUED | OUTPATIENT
Start: 2025-06-05 | End: 2025-06-06 | Stop reason: HOSPADM

## 2025-06-05 RX ORDER — SODIUM CHLORIDE 9 MG/ML
40 INJECTION, SOLUTION INTRAVENOUS AS NEEDED
Status: DISCONTINUED | OUTPATIENT
Start: 2025-06-05 | End: 2025-06-06 | Stop reason: HOSPADM

## 2025-06-05 RX ORDER — ONDANSETRON 4 MG/1
4 TABLET, ORALLY DISINTEGRATING ORAL EVERY 6 HOURS PRN
Status: DISCONTINUED | OUTPATIENT
Start: 2025-06-05 | End: 2025-06-06 | Stop reason: HOSPADM

## 2025-06-05 RX ORDER — POLYETHYLENE GLYCOL 3350 17 G/17G
17 POWDER, FOR SOLUTION ORAL DAILY PRN
Status: DISCONTINUED | OUTPATIENT
Start: 2025-06-05 | End: 2025-06-06 | Stop reason: HOSPADM

## 2025-06-05 RX ADMIN — ACETAMINOPHEN 650 MG: 325 TABLET ORAL at 17:20

## 2025-06-05 RX ADMIN — SODIUM CHLORIDE 1000 ML: 900 INJECTION, SOLUTION INTRAVENOUS at 12:08

## 2025-06-05 NOTE — CASE MANAGEMENT/SOCIAL WORK
Discharge Planning Assessment   Rudi     Patient Name: Rand Asencio  MRN: [id number]  Today's Date: 6/5/2025    Admit Date: 6/5/2025    Plan: Home with Fan SO, [employer] (Unsure of [employer] company)   Discharge Needs Assessment       Row Name 06/05/25 0419       Living Environment    People in Home significant other;other (see comments)    Name(s) of People in Home Yandel Womack (adult brother to Fan)    Current Living Arrangements home    Potentially Unsafe Housing Conditions none    In the past 12 months has the electric, gas, oil, or water company threatened to shut off services in your home? No    Primary Care Provided by self    Provides Primary Care For no one    Family Caregiver if Needed significant other    Family Caregiver Names Fan    Quality of Family Relationships helpful;involved;supportive    Able to Return to Prior Arrangements yes       Resource/Environmental Concerns    Resource/Environmental Concerns none    Transportation Concerns none       Transportation Needs    In the past 12 months, has lack of transportation kept you from medical appointments or from getting medications? no    In the past 12 months, has lack of transportation kept you from meetings, work, or from getting things needed for daily living? No       Food Insecurity    Within the past 12 months, you worried that your food would run out before you got the money to buy more. Never true    Within the past 12 months, the food you bought just didn't last and you didn't have money to get more. Never true       Transition Planning    Patient/Family Anticipates Transition to other (see comments)  Home with Fan and his brother Yandel    Patient/Family Anticipated Services at Transition none    Transportation Anticipated car, drives self;family or friend will provide       Discharge Needs Assessment    Readmission Within the Last 30 Days no previous admission in last 30 days    Equipment Currently Used at Home cane,  straight;nebulizer  Fan is unsure of [email or web address] company pt uses.    Concerns to be Addressed discharge planning    Concerns Comments Pt. works FT job at home per Fan.    Anticipated Changes Related to Illness other (see comments)  Unknown at this time.    Equipment Needed After Discharge none              Discharge Plan       Row Name 06/05/25 1700       Plan    Plan Home with Fan SO, [employer] (Unsure of DME company)    Patient/Family in Agreement with Plan yes    Plan Comments CM met with patient at the bedside to review discharge planning. Patient asked that I call Fan to answer questions.  Fan called a [phone].  Patient lives in a house with Fan and his adult brother Yandel.  Pt is independent with most IADLs but Fan shares in helping out with some.  Fan will transport patient at d/c. Confirmed PCP-Aida Reyes, insurance, and pharmacy. Fan declined M2B, updated in EPIC. Fan voiced he thought pt had some difficulties paying rent at the house prior because she was paying for her apartment as well.  Fan voiced things are better now and Rand is helping pay the rent on the home. Fan denies any difficulty affording food, utilities, or medications. Fan voiced pt. has been stressed over the cost of medications but has been able to work things out. Patient is not current with any HHC/PT services. DC Barriers: Monitor labs, Neurology Consult.              Demographic Summary       Row Name 06/05/25 1653       General Information    Admission Type observation    Arrived From home    Referral Source admission list    Reason for Consult discharge planning    Preferred Language English       Contact Information    Permission Granted to Share Info With ;family/designee  Fan boyfriend              Functional Status       Row Name 06/05/25 1654       Functional Status    Usual Activity Tolerance moderate    Current Activity Tolerance poor       Functional Status, IADL    Medications  independent    Meal Preparation assistive person  Fan and patient share this chore.    Housekeeping assistive person  Fan and patient share this chore.    Laundry independent    Shopping assistive person  Fan and patient share this chore.    If for any reason you need help with day-to-day activities such as bathing, preparing meals, shopping, managing finances, etc., do you get the help you need? I get all the help I need     Petra Anaya RN    [address] [address]  Phone: [phone]  Fax: [phone]

## 2025-06-05 NOTE — PLAN OF CARE
Goal Outcome Evaluation:    New admit. Awaiting neurology consult for further recommendations

## 2025-06-05 NOTE — ED PROVIDER NOTES
Subjective   History of Present Illness  20-year-old female presents for altered mental status.  Found down this morning.  Had last talked to her boyfriend on the phone approximately 30 to 40 minutes before being found.  Initially alert and oriented x 0 now alert and oriented x 1.  Remote history of seizures when she was on a medication but has not had any since she stopped that medication.  States she has a headache and some slight chest pain.  Apparently recently had a pneumothorax per family that is with her.  See her birthday.  Review of Systems  See HPI.  Past Medical History:   Diagnosis Date    Allergic 2016    Last allergy test was with  at [facility] in [place] in 2024    Allergies     Anxiety 2024    Arthritis 2021    Due to multiple ankle sprains/surgery    Asthma     Depression 2025    Due to suicide attempt    Eating disorder 2019    Due to wrestling    Traumatic closed nondisplaced fracture of distal fibula, right, initial encounter 05/03/2021    Visual impairment 2019    Astigmatism       Allergies   Allergen Reactions    Fluticasone Furoate-Vilanterol Seizure       Past Surgical History:   Procedure Laterality Date    LEG TENDON REPAIR Right 01/24/2022    Procedure: TENDON REPAIR FOOT/TOE -posterior tibial tendon repair, Tarsal Tunnel decompression, biopsy of soft tissue mass;  Surgeon: DAMION Hammer DPM;  Location: [facility] OR;  Service: Podiatry;  Laterality: Right;    LYMPH NODE BIOPSY Right     neck       Family History   Problem Relation Age of Onset    Miscarriages / Stillbirths Mother     No Known Problems Father     Diabetes Maternal Grandmother     Cancer Maternal Grandmother     Hypertension Maternal Grandmother     Heart disease Maternal Grandmother     Vision loss Maternal Grandmother     Heart disease Maternal Grandfather     Heart attack Maternal Grandfather     Hypertension Maternal Grandfather        Social History     Socioeconomic History    Marital  "status: Single   Tobacco Use    Smoking status: Never     Passive exposure: Never    Smokeless tobacco: Never   Vaping Use    Vaping status: Never Used   Substance and Sexual Activity    Alcohol use: Never    Drug use: Never    Sexual activity: Yes     Partners: Male     Birth control/protection: Condom           Objective   Physical Exam  Constitutional: Tearful  Head:  Atraumatic.  Normocephalic.   Eyes:  No scleral icterus. Normal conjunctivae  ENT:  Moist mucosa.  No nasal discharge present.  Cardiovascular:  Well perfused.  Equal pulses.  Regular rate.  Normal capillary refill.    Pulmonary/Chest:  No respiratory distress.  Airway patent.  No tachypnea.  No accessory muscle usage.  Clear to auscultation bilaterally  Abdominal:  Nondistended. Nontender.   Skin:  Warm, dry  Neurological:  Alert, awake.  Normal speech.  Oriented x 1.  Normal strength sensation all 4 extremities.  Cranial nerves II through XII grossly intact.    Procedures           ED Course      /71 (BP Location: Right arm, Patient Position: Lying)   Pulse 83   Temp 98.1 °F (36.7 °C) (Oral)   Resp 16   Ht 170.2 cm (67\")   Wt 70.4 kg (155 lb 3.3 oz)   LMP 05/06/2025 (Exact Date)   SpO2 100%   BMI 24.31 kg/m²   Labs Reviewed   COMPREHENSIVE METABOLIC PANEL - Abnormal; Notable for the following components:       Result Value    Glucose 112 (*)     CO2 21.1 (*)     All other components within normal limits    Narrative:     GFR Categories in Chronic Kidney Disease (CKD)              GFR Category          GFR (mL/min/1.73)    Interpretation  G1                    90 or greater        Normal or high (1)  G2                    60-89                Mild decrease (1)  G3a                   45-59                Mild to moderate decrease  G3b                   30-44                Moderate to severe decrease  G4                    15-29                Severe decrease  G5                    14 or less           Kidney failure    (1)In the " absence of evidence of kidney disease, neither GFR category G1 or G2 fulfill the criteria for CKD.    eGFR calculation 2021 CKD-EPI creatinine equation, which does not include race as a factor   URINE DRUG SCREEN - Abnormal; Notable for the following components:    THC, Screen, Urine Positive (*)     All other components within normal limits    Narrative:     Cutoff For Drugs Screened:    Amphetamines               500 ng/ml  Barbiturates               200 ng/ml  Benzodiazepines            150 ng/ml  Cocaine                    150 ng/ml  Methadone                  200 ng/ml  Opiates                    100 ng/ml  Phencyclidine               25 ng/ml  THC                         50 ng/ml  Methamphetamine            500 ng/ml  Tricyclic Antidepressants  300 ng/ml  Oxycodone                  100 ng/ml  Buprenorphine               10 ng/ml    The normal value for all drugs tested is negative. This report includes unconfirmed screening results, with the cutoff values listed, to be used for medical treatment purposes only.  Unconfirmed results must not be used for non-medical purposes such as employment or legal testing.  Clinical consideration should be applied to any drug of abuse test, particularly when unconfirmed results are used.    All urine drugs of abuse requests without chain of custody are for medical screening purposes only.  False positives are possible.     POCT GLUCOSE FINGERSTICK - Abnormal; Notable for the following components:    Glucose 108 (*)     All other components within normal limits   POC LACTATE - Abnormal; Notable for the following components:    Lactate <0.3 (*)     All other components within normal limits   HCG, SERUM, QUALITATIVE - Normal   TSH RFX ON ABNORMAL TO FREE T4 - Normal   CK - Normal   SALICYLATE LEVEL - Normal   ACETAMINOPHEN LEVEL - Normal    Narrative:     Acetaminophen Therapeutic Range  5-20 ug/mL      Hours after ingestion            Toxic Value    4 Hours                            150 ug/mL    8 Hours                            70 ug/mL   12 Hours                            40 ug/mL   16 Hours                            20 ug/mL    These values apply to a single ingestion only.    CBC WITH AUTO DIFFERENTIAL - Normal   ETHANOL    Narrative:     Plasma Ethanol Clinical Symptoms:    ETOH (%)               Clinical Symptom  .01-.05              No apparent influence  .03-.12              Euphoria, Diminished judgment and attention   .09-.25              Impaired comprehension, Muscle incoordination  .18-.30              Confusion, Staggered gait, Slurred speech  .25-.40              Markedly decreased response to stimuli, unable to stand or                        walk, vomitting, sleep or stupor  .35-.50              Comatose, Anesthesia, Subnormal body temperature       POC LACTATE   CBC AND DIFFERENTIAL    Narrative:     The following orders were created for panel order CBC & Differential.  Procedure                               Abnormality         Status                     ---------                               -----------         ------                     CBC Auto Differential[[id number]]        Normal              Final result                 Please view results for these tests on the individual orders.     Medications   sodium chloride 0.9 % flush 10 mL (has no administration in time range)   sodium chloride 0.9 % flush 10 mL (has no administration in time range)   sodium chloride 0.9 % flush 10 mL (has no administration in time range)   sodium chloride 0.9 % infusion 40 mL (has no administration in time range)   aluminum-magnesium hydroxide-simethicone (MAALOX MAX) 400-400-40 MG/5ML suspension 15 mL (has no administration in time range)   ondansetron ODT (ZOFRAN-ODT) disintegrating tablet 4 mg (has no administration in time range)     Or   ondansetron (ZOFRAN) injection 4 mg (has no administration in time range)   sennosides-docusate (PERICOLACE) 8.6-50 MG per tablet 2 tablet (has  no administration in time range)     And   polyethylene glycol (MIRALAX) packet 17 g (has no administration in time range)     And   bisacodyl (DULCOLAX) EC tablet 5 mg (has no administration in time range)     And   bisacodyl (DULCOLAX) suppository 10 mg (has no administration in time range)   acetaminophen (TYLENOL) tablet 650 mg (has no administration in time range)     Or   acetaminophen (TYLENOL) 160 MG/5ML oral solution 650 mg (has no administration in time range)     Or   acetaminophen (TYLENOL) suppository 650 mg (has no administration in time range)   sodium chloride 0.9 % bolus 1,000 mL (0 mL Intravenous Stopped 6/5/25 7472)     CT Head Without Contrast   Final Result   Impression:   1. No acute intracranial abnormality.   2. Low-lying cerebellar tonsils.               Electronically Signed: Neto Dias MD     6/5/2025 1:57 PM EDT     Workstation ID: [id number]      XR Chest 1 View   Final Result   Impression:   No acute process.               Electronically Signed: Neto Dias MD     6/5/2025 1:23 PM EDT     Workstation ID: [id number]                                                         Medical Decision Making  Problems Addressed:  Altered mental status, unspecified altered mental status type: complicated acute illness or injury    Amount and/or Complexity of Data Reviewed  Labs: ordered.  Radiology: ordered.  ECG/medicine tests: ordered.    Risk  OTC drugs.  Prescription drug management.  Decision regarding hospitalization.    My interpretation of CT head is negative for subdural hematoma.  T-System for radiology interpretation.    EKG interpretation: 1145, rate 92, normal sinus rhythm, normal axis, normal intervals, no acute ischemic changes    Patient still not quite at mental status baseline but is improving.  Down his birthday.  Able to answer more questions appropriately per family.  Hemodynamically stable.  Unclear etiology.  High suspicion for seizure with prolonged postictal state based on  history and progression here.  Will place in observation.    Final diagnoses:   Altered mental status, unspecified altered mental status type       ED Disposition  ED Disposition       ED Disposition   Decision to Admit    Condition   --    Comment   --               No follow-up provider specified.       Medication List        ASK your doctor about these medications      ipratropium-albuterol 0.5-2.5 mg/3 ml nebulizer  Commonly known as: DUO-NEB  Take 3 mL by nebulization Every 4 (Four) Hours As Needed for Wheezing.  Ask about: Which instructions should I use?                 Jarvis Deal MD  06/05/25 6651

## 2025-06-06 ENCOUNTER — READMISSION MANAGEMENT (OUTPATIENT)
Dept: CALL CENTER | Facility: HOSPITAL | Age: 20
End: 2025-06-06
Payer: COMMERCIAL

## 2025-06-06 VITALS
TEMPERATURE: 97.9 F | WEIGHT: 155.2 LBS | DIASTOLIC BLOOD PRESSURE: 59 MMHG | SYSTOLIC BLOOD PRESSURE: 99 MMHG | BODY MASS INDEX: 24.36 KG/M2 | RESPIRATION RATE: 16 BRPM | OXYGEN SATURATION: 98 % | HEIGHT: 67 IN | HEART RATE: 78 BPM

## 2025-06-06 LAB
ANION GAP SERPL CALCULATED.3IONS-SCNC: 10.1 MMOL/L (ref 5–15)
BACTERIA UR QL AUTO: NORMAL /HPF
BASOPHILS # BLD AUTO: 0.03 10*3/MM3 (ref 0–0.2)
BASOPHILS NFR BLD AUTO: 0.5 % (ref 0–1.5)
BILIRUB UR QL STRIP: NEGATIVE
BUN SERPL-MCNC: 10 MG/DL (ref 6–20)
BUN/CREAT SERPL: 12.8 (ref 7–25)
CALCIUM SPEC-SCNC: 9.1 MG/DL (ref 8.6–10.5)
CHLORIDE SERPL-SCNC: 104 MMOL/L (ref 98–107)
CLARITY UR: CLEAR
CO2 SERPL-SCNC: 22.9 MMOL/L (ref 22–29)
COLOR UR: YELLOW
CREAT SERPL-MCNC: 0.78 MG/DL (ref 0.57–1)
DEPRECATED RDW RBC AUTO: 39.6 FL (ref 37–54)
EGFRCR SERPLBLD CKD-EPI 2021: 111.7 ML/MIN/1.73
EOSINOPHIL # BLD AUTO: 0.1 10*3/MM3 (ref 0–0.4)
EOSINOPHIL NFR BLD AUTO: 1.7 % (ref 0.3–6.2)
ERYTHROCYTE [DISTWIDTH] IN BLOOD BY AUTOMATED COUNT: 13 % (ref 12.3–15.4)
GLUCOSE SERPL-MCNC: 88 MG/DL (ref 65–99)
GLUCOSE UR STRIP-MCNC: NEGATIVE MG/DL
HCT VFR BLD AUTO: 36 % (ref 34–46.6)
HGB BLD-MCNC: 11.8 G/DL (ref 12–15.9)
HGB UR QL STRIP.AUTO: ABNORMAL
HYALINE CASTS UR QL AUTO: NORMAL /LPF
IMM GRANULOCYTES # BLD AUTO: 0.03 10*3/MM3 (ref 0–0.05)
IMM GRANULOCYTES NFR BLD AUTO: 0.5 % (ref 0–0.5)
KETONES UR QL STRIP: NEGATIVE
LEUKOCYTE ESTERASE UR QL STRIP.AUTO: ABNORMAL
LYMPHOCYTES # BLD AUTO: 2.14 10*3/MM3 (ref 0.7–3.1)
LYMPHOCYTES NFR BLD AUTO: 36.6 % (ref 19.6–45.3)
MAGNESIUM SERPL-MCNC: 2.2 MG/DL (ref 1.7–2.2)
MCH RBC QN AUTO: 27.6 PG (ref 26.6–33)
MCHC RBC AUTO-ENTMCNC: 32.8 G/DL (ref 31.5–35.7)
MCV RBC AUTO: 84.1 FL (ref 79–97)
MONOCYTES # BLD AUTO: 0.72 10*3/MM3 (ref 0.1–0.9)
MONOCYTES NFR BLD AUTO: 12.3 % (ref 5–12)
NEUTROPHILS NFR BLD AUTO: 2.83 10*3/MM3 (ref 1.7–7)
NEUTROPHILS NFR BLD AUTO: 48.4 % (ref 42.7–76)
NITRITE UR QL STRIP: NEGATIVE
NRBC BLD AUTO-RTO: 0 /100 WBC (ref 0–0.2)
PH UR STRIP.AUTO: 8 [PH] (ref 5–8)
PHOSPHATE SERPL-MCNC: 4 MG/DL (ref 2.5–4.5)
PLATELET # BLD AUTO: 284 10*3/MM3 (ref 140–450)
PMV BLD AUTO: 10.2 FL (ref 6–12)
POTASSIUM SERPL-SCNC: 3.9 MMOL/L (ref 3.5–5.2)
PROT UR QL STRIP: NEGATIVE
QT INTERVAL: 341 MS
QTC INTERVAL: 422 MS
RBC # BLD AUTO: 4.28 10*6/MM3 (ref 3.77–5.28)
RBC # UR STRIP: NORMAL /HPF
REF LAB TEST METHOD: NORMAL
SODIUM SERPL-SCNC: 137 MMOL/L (ref 136–145)
SP GR UR STRIP: 1.01 (ref 1–1.03)
SQUAMOUS #/AREA URNS HPF: NORMAL /HPF
UROBILINOGEN UR QL STRIP: ABNORMAL
WBC # UR STRIP: NORMAL /HPF
WBC NRBC COR # BLD AUTO: 5.85 10*3/MM3 (ref 3.4–10.8)

## 2025-06-06 PROCEDURE — 83735 ASSAY OF MAGNESIUM: CPT | Performed by: NURSE PRACTITIONER

## 2025-06-06 PROCEDURE — 85025 COMPLETE CBC W/AUTO DIFF WBC: CPT | Performed by: PHYSICIAN ASSISTANT

## 2025-06-06 PROCEDURE — 80048 BASIC METABOLIC PNL TOTAL CA: CPT | Performed by: PHYSICIAN ASSISTANT

## 2025-06-06 PROCEDURE — G0378 HOSPITAL OBSERVATION PER HR: HCPCS

## 2025-06-06 PROCEDURE — 94761 N-INVAS EAR/PLS OXIMETRY MLT: CPT

## 2025-06-06 PROCEDURE — 94799 UNLISTED PULMONARY SVC/PX: CPT

## 2025-06-06 PROCEDURE — 99214 OFFICE O/P EST MOD 30 MIN: CPT | Performed by: NURSE PRACTITIONER

## 2025-06-06 PROCEDURE — 84100 ASSAY OF PHOSPHORUS: CPT | Performed by: NURSE PRACTITIONER

## 2025-06-06 PROCEDURE — 99204 OFFICE O/P NEW MOD 45 MIN: CPT | Performed by: PSYCHIATRY & NEUROLOGY

## 2025-06-06 PROCEDURE — 94640 AIRWAY INHALATION TREATMENT: CPT

## 2025-06-06 PROCEDURE — 94664 DEMO&/EVAL PT USE INHALER: CPT

## 2025-06-06 RX ORDER — IPRATROPIUM BROMIDE AND ALBUTEROL SULFATE 2.5; .5 MG/3ML; MG/3ML
3 SOLUTION RESPIRATORY (INHALATION) EVERY 4 HOURS PRN
Status: DISCONTINUED | OUTPATIENT
Start: 2025-06-06 | End: 2025-06-06 | Stop reason: HOSPADM

## 2025-06-06 RX ORDER — ALBUTEROL SULFATE 0.63 MG/3ML
1 SOLUTION RESPIRATORY (INHALATION) EVERY 6 HOURS PRN
Status: DISCONTINUED | OUTPATIENT
Start: 2025-06-06 | End: 2025-06-06 | Stop reason: HOSPADM

## 2025-06-06 RX ORDER — BUDESONIDE 0.5 MG/2ML
0.5 INHALANT ORAL 2 TIMES DAILY
Status: DISCONTINUED | OUTPATIENT
Start: 2025-06-06 | End: 2025-06-06 | Stop reason: HOSPADM

## 2025-06-06 RX ORDER — HYDROXYZINE HYDROCHLORIDE 25 MG/1
50 TABLET, FILM COATED ORAL 4 TIMES DAILY PRN
Status: DISCONTINUED | OUTPATIENT
Start: 2025-06-06 | End: 2025-06-06 | Stop reason: HOSPADM

## 2025-06-06 RX ORDER — FLUVOXAMINE MALEATE 50 MG
100 TABLET ORAL NIGHTLY
Status: DISCONTINUED | OUTPATIENT
Start: 2025-06-06 | End: 2025-06-06 | Stop reason: HOSPADM

## 2025-06-06 RX ORDER — CETIRIZINE HYDROCHLORIDE 10 MG/1
10 TABLET ORAL DAILY
Status: DISCONTINUED | OUTPATIENT
Start: 2025-06-06 | End: 2025-06-06 | Stop reason: HOSPADM

## 2025-06-06 RX ORDER — LAMOTRIGINE 25 MG/1
50 TABLET ORAL EVERY 12 HOURS SCHEDULED
Status: DISCONTINUED | OUTPATIENT
Start: 2025-06-06 | End: 2025-06-06 | Stop reason: HOSPADM

## 2025-06-06 RX ORDER — FLUVOXAMINE MALEATE 50 MG
50 TABLET ORAL NIGHTLY
Status: DISCONTINUED | OUTPATIENT
Start: 2025-06-06 | End: 2025-06-06

## 2025-06-06 RX ADMIN — CARIPRAZINE 1.5 MG: 1.5 CAPSULE, GELATIN COATED ORAL at 08:05

## 2025-06-06 RX ADMIN — Medication 10 ML: at 08:05

## 2025-06-06 RX ADMIN — Medication 10 ML: at 00:31

## 2025-06-06 RX ADMIN — CETIRIZINE HYDROCHLORIDE 10 MG: 10 TABLET, FILM COATED ORAL at 08:05

## 2025-06-06 RX ADMIN — BUDESONIDE 0.5 MG: 0.5 SUSPENSION RESPIRATORY (INHALATION) at 10:39

## 2025-06-06 RX ADMIN — LAMOTRIGINE 50 MG: 25 TABLET ORAL at 08:05

## 2025-06-06 NOTE — PAYOR COMM NOTE
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    Allergies     Anxiety 2024    Arthritis 2021    Due to multiple ankle sprains/surgery    Asthma     Depression 2025    Due to suicide attempt    Eating disorder 2019    Due to wrestling    Traumatic closed nondisplaced fracture of distal fibula, right, initial encounter 05/03/2021    Visual impairment 2019    Astigmatism   ,   Past Surgical History:   Procedure Laterality Date    LEG TENDON REPAIR Right 01/24/2022    Procedure: TENDON REPAIR FOOT/TOE -posterior tibial tendon repair, Tarsal Tunnel decompression, biopsy of soft tissue mass;  Surgeon: DAMION Hammer DPM;  Location: [facility];  Service: Podiatry;  Laterality: Right;    LYMPH NODE BIOPSY Right     neck   ,   Family History   Problem Relation Age of Onset    Miscarriages / Stillbirths Mother     No Known Problems Father     Diabetes Maternal Grandmother     Cancer Maternal Grandmother     Hypertension Maternal Grandmother     Heart disease Maternal Grandmother     Vision loss Maternal Grandmother     Heart disease Maternal Grandfather     Heart attack Maternal Grandfather     Hypertension Maternal Grandfather    ,   Social History     Tobacco Use     Smoking status: Never     Passive exposure: Never    Smokeless tobacco: Never   Vaping Use    Vaping status: Never Used   Substance Use Topics    Alcohol use: Never    Drug use: Never   ,   Prior to Admission medications    Medication Sig Start Date End Date Taking? Authorizing Provider   budesonide (Pulmicort) 0.5 MG/2ML nebulizer solution Take 2 mL by nebulization 2 (Two) Times a Day. Dispense as 1 box of unit doses 11/20/24  Yes Eric Pierre MD   cetirizine (zyrTEC) 10 MG tablet Take 1 tablet by mouth Daily. 4/1/25  Yes Екатерина Alcocer MD   fluticasone (FLONASE) 50 MCG/ACT nasal spray Administer 2 sprays into the nostril(s) as directed by provider Daily.   Yes Екатерина Alcocer MD   fluvoxaMINE (LUVOX) 100 MG tablet Take 1 tablet by mouth Every Night. 4/3/25  Yes Екатерина Alcocer MD   lamoTRIgine (LaMICtal) 100 MG tablet Take 0.5 tablets by mouth Every 12 (Twelve) Hours. 4/3/25  Yes Екатерина Alcocer MD   Tezspire 210 MG/1.91ML solution auto-injector Inject 1.91 mL under the skin into the appropriate area as directed Every 30 (Thirty) Days.   Yes Екатерина Alcocer MD   tiotropium bromide-olodaterol (Stiolto Respimat) 2.5-2.5 MCG/ACT aerosol solution inhaler Inhale 1 puff Daily.   Yes Екатерина Alcocer MD   Vraylar 1.5 MG capsule capsule Take 1 capsule by mouth Daily. 4/11/25  Yes Екатерина Alcocer MD   albuterol (ACCUNEB) 0.63 MG/3ML nebulizer solution Take 3 mL by nebulization Every 6 (Six) Hours As Needed for Wheezing. 11/8/24   Marissa Vogel PA   hydrOXYzine pamoate (VISTARIL) 50 MG capsule Take 1 capsule by mouth 4 (Four) Times a Day As Needed. 3/14/25   Екатерина Alcocer MD   ipratropium-albuterol (DUO-NEB) 0.5-2.5 mg/3 ml nebulizer Take 3 mL by nebulization Every 4 (Four) Hours As Needed for Wheezing. 11/20/24   Eric Pierre MD   predniSONE (DELTASONE) 50 MG tablet Take 1 tablet by mouth Daily.    Provider, Екатерина, MD    Allergies:  Fluticasone  furoate-vilanterol    Current Facility-Administered Medications   Medication Dose Route Frequency Provider Last Rate Last Admin    acetaminophen (TYLENOL) tablet 650 mg  650 mg Oral Q4H PRN Paola Reyes PA-C   650 mg at 06/05/25 1720    Or    acetaminophen (TYLENOL) 160 MG/5ML oral solution 650 mg  650 mg Oral Q4H PRN Paola Reyes PA-C        Or    acetaminophen (TYLENOL) suppository 650 mg  650 mg Rectal Q4H PRN Paola Reyes PA-C        albuterol (ACCUNEB) nebulizer solution 0.63 mg  1 ampule Nebulization Q6H PRN Dorothea Arreguin APRN        aluminum-magnesium hydroxide-simethicone (MAALOX MAX) 400-400-40 MG/5ML suspension 15 mL  15 mL Oral Q6H PRN Paola Reyes PA-C        sennosides-docusate (PERICOLACE) 8.6-50 MG per tablet 2 tablet  2 tablet Oral BID PRN Paola Reyes PA-C        And    polyethylene glycol (MIRALAX) packet 17 g  17 g Oral Daily PRN Paola Reyes PA-C        And    bisacodyl (DULCOLAX) EC tablet 5 mg  5 mg Oral Daily PRN Paola Reyes PA-C        And    bisacodyl (DULCOLAX) suppository 10 mg  10 mg Rectal Daily PRN Paola Reyes PA-C        budesonide (PULMICORT) nebulizer solution 0.5 mg  0.5 mg Nebulization BID Dorothea Arreguin APRN   0.5 mg at 06/06/25 1039    Cariprazine HCl (VRAYLAR) capsule capsule 1.5 mg  1.5 mg Oral Daily Dorothea Arreguin APRN   1.5 mg at 06/06/25 0805    cetirizine (zyrTEC) tablet 10 mg  10 mg Oral Daily Dorothea Arreguin APRN   10 mg at 06/06/25 0805    fluvoxaMINE (LUVOX) tablet 100 mg  100 mg Oral Nightly Dorothea Arreguin APRN        hydrOXYzine (ATARAX) tablet 50 mg  50 mg Oral 4x Daily PRN Dorothea Arreguin APRN        ipratropium-albuterol (DUO-NEB) nebulizer solution 3 mL  3 mL Nebulization Q4H PRN Dorothea Arreguin APRN        lamoTRIgine (LaMICtal) tablet 50 mg  50 mg Oral Q12H Dorothea Arreguin APRN   50 mg at 06/06/25 0805    ondansetron ODT (ZOFRAN-ODT) disintegrating tablet 4 mg  4 mg Oral Q6H PRN  Paola Reyes PA-C        Or    ondansetron (ZOFRAN) injection 4 mg  4 mg Intravenous Q6H PRN Paola Reyes PA-C        sodium chloride 0.9 % flush 10 mL  10 mL Intravenous PRN Jarvis Deal MD        sodium chloride 0.9 % flush 10 mL  10 mL Intravenous Q12H Paola Reyes PA-C   10 mL at 06/06/25 0805    sodium chloride 0.9 % flush 10 mL  10 mL Intravenous PRN Paola Reyes PA-C        sodium chloride 0.9 % infusion 40 mL  40 mL Intravenous PRN Paola Reyes PA-C            ________________________________________________________     Objective   OBJECTIVE:  Upon today's exam, pt is awake and alert. She is a little groggy and slow to respond, but appropriate.     PHYSICAL EXAM:    CONSTITUTIONAL: The patient is in no apparent distress, awake and alert.      HEENT: There is no tenderness over the temporal arteries bilaterally. Normocephalic, atraumatic. TMJ open symmetrically without tenderness.    NECK: ROM normal, supple    RESPIRATORY: Breathing unlabored, Breath sounds are normal    CARDIAC: Regular rate and rhythm.     EXTREMITIES:  No clubbing, cyanosis or edema.    PSYCHIATRIC: Mood/affect flat, judgement normal, appropriate    NEUROLOGICAL:    Cognition:   Oriented to person and place. States it is March 2023  Fund of knowledge good.  Concentration and attention normal.   Language normal with normal comprehension, fluent speech, intact repetition and naming.   Poor historian     Cranial nerves;    II - pupils bilaterally equal reacting to light,  No new Visual field deficits;  Fundoscopic exam- Not able to be done, non-dilated exam  III,IV,VI: EOMI with no diplopia  V: Normal facial sensations  VII: No facial asymmetry,  VIII: No New hearing abnormality  IX, X, XI: normal gag and shoulder shrug;  XII: tongue is in the midline.    Sensory:  Intact to light touch in all extremities.     Motor: Strength 5/5 bilaterally upper and lower extremities. No involuntary movements present. Normal tone  and bulk.  Deep tendon reflexes: 2/4 and symmetrical in biceps, brachioradialis, triceps, bilateral 2/4 knees and ankles. Both plantars are flexor.    Cerebellar: Finger to nose and mirror movements normal bilaterally.    Gait and balance: deferred            ________________________________________________________   RESULTS REVIEW:    VITAL SIGNS:   Temp:  [97.8 °F (36.6 °C)-98.3 °F (36.8 °C)] 97.9 °F (36.6 °C)  Heart Rate:  [] 78  Resp:  [16-20] 16  BP: ()/() 99/59     LABS:      Lab 06/06/25  0513 06/05/25  1210 06/05/25  1206 06/02/25 2052   WBC 5.85  --  6.77 18.99*   HEMOGLOBIN 11.8*  --  13.0 13.3   HEMATOCRIT 36.0  --  39.9 40.1   PLATELETS 284  --  340 360   NEUTROS ABS 2.83  --  4.59 14.28*   IMMATURE GRANS (ABS) 0.03  --  0.03 0.15*   LYMPHS ABS 2.14  --  1.45 3.30*   MONOS ABS 0.72  --  0.62 1.13*   EOS ABS 0.10  --  0.06 0.07   MCV 84.1  --  82.4 83.4   SED RATE  --   --   --  5   LACTATE  --  <0.3*  --   --          Lab 06/06/25  0513 06/05/25  1206 06/02/25 2052   SODIUM 137 136 136   POTASSIUM 3.9 4.0 3.3*   CHLORIDE 104 104 100   CO2 22.9 21.1* 21.1*   ANION GAP 10.1 10.9 14.9   BUN 10.0 7.7 7.0   CREATININE 0.78 0.73 0.74   EGFR 111.7 120.9 119.0   GLUCOSE 88 112* 88   CALCIUM 9.1 9.3 9.1   MAGNESIUM  --   --  2.2   TSH  --  1.170 2.010         Lab 06/05/25  1206   TOTAL PROTEIN 7.5   ALBUMIN 4.7   GLOBULIN 2.8   ALT (SGPT) 28   AST (SGOT) 29   BILIRUBIN 0.6   ALK PHOS 58                     UA          4/21/2025    17:20 4/25/2025    09:31 6/5/2025    13:18   Urinalysis   Squamous Epithelial Cells, UA   0-2    Specific Gravity, UA 1.012  1.025  1.010    Ketones, UA Negative  Negative  Negative    Blood, UA Negative  Negative  Moderate (2+)    Leukocytes, UA Negative  Negative  Trace    Nitrite, UA Negative  Negative  Negative    RBC, UA   0-2    WBC, UA   0-2    Bacteria, UA   None Seen        Lab Results   Component Value Date    TSH 1.170 06/05/2025    LDL 91 04/25/2025     HGBA1C 5.10 04/25/2025    [id number] 663 04/25/2025       IMAGING STUDIES:  CT Head Without Contrast  Result Date: 6/5/2025  Impression: 1. No acute intracranial abnormality. 2. Low-lying cerebellar tonsils. Electronically Signed: Neto Dias MD  6/5/2025 1:57 PM EDT  Workstation ID: [id number]    XR Chest 1 View  Result Date: 6/5/2025  Impression: No acute process. Electronically Signed: Neto Dias MD  6/5/2025 1:23 PM EDT  Workstation ID: [id number]      I reviewed the patient's new clinical results.    ________________________________________________________     PROBLEM LIST:    Altered mental status, unspecified        ASSESSMENT/PLAN:  1. Unresponsive episode, found down. Possible syncope vs seizure. Pt states the spell was likely preceded by an asthma attack. She has a remote hx of seizure due to a medication and seizures stopped once the medication was stopped. No hx of epilepsy. The spell was not witnessed. CT shows low lying cerebellar tonsils which, in the setting of an asthma attack severe coughing spells, could lead to tussive syncope   - CT Head: No acute intracranial abnormality. Low-lying cerebellar tonsils.   - MRI brain (6/2/25): No acute intracranial process identified   - EKG:Sinus rhythm   - EEG can be done electively as outpt. EEG is not available at this facility on the weekends   - Labs: TSH: 1.17, mag: P, phos: P, sodium: 137, calcium: 9.1  - Seizures/Syncope precautions (see below)  - Pt is already on Lamictal so will continue. If she has any future events, consider increasing dose and follow up with neuro   - Consider outpt neurosurgery evaluation to further evaluate low-lying cerebellar tonsils with possible tussive syncope and frequent headaches.       SEIZURE/SYNCOPE INSTRUCTIONS:  -Recommended to observe all seizure precautions, including, but not limited to:   -Recommend no driving until seizure/syncope free for more than 3 months- Refer to local/state driving laws from licensing state;    -Avoid all high-risk activity, Take showers instead of baths, Avoid swimming without observation, Avoid open heat sources, Avoid working at heights, and Avoid engaging in all potentially hazardous activities.   - Maintain healthy lifestyle including stress management, routine sleep schedule, and a well balanced diet.  Alcohol and drugs lowers the seizure threshold and should be avoided. Take all medications as prescribed.  -Patient expressed clear understanding.    Will sign off. Please call with questions or concerns.       I discussed the patient's findings and my recommendations with patient, nursing staff, and consulting provider    MARIAELENA Qureshi  06/06/25  11:47 EDT          Electronically signed by Ashanti Rodriguez APRN at 06/06/25 1002

## 2025-06-06 NOTE — CASE MANAGEMENT/SOCIAL WORK
Social Work Assessment  [facility]     Patient Name: Rand Asencio  MRN: [id number]  Today's Date: 6/6/2025    Admit Date: 6/5/2025     Demographic Summary       Row Name 06/06/25 1323       General Information    Admission Type observation    Arrived From emergency department    Referral Source admission list    Reason for Consult mental health concerns    General Information Comments LSW received S/C that attending MD requesting psych consult for mental health concerns including a HX of SI. LSW notified attending, Dorothea and care team that this writer will notify weekend SW to follow. LSW sent S/C to weekend SWDiego and placed info on weekend handoff. LSW to follow.       Contact Information    Permission Granted to Share Info With            Katerina Richter, [facility]  Medical Social Worker

## 2025-06-06 NOTE — H&P
"[facility] H&P    Patient Name: Rand Asencio  : 2005  MRN: [id number]  Primary Care Physician: Aida Reyes APRN  Date of admission: 2025     Patient Care Team:  Aida Reyes APRN as PCP - General (Emergency Medicine)          Subjective   History Present Illness     Chief Complaint:   Chief Complaint   Patient presents with    Altered Mental Status     Family states she was found home unresponsive on the floor.  States she texted him that she felt like she was going to pass out and when he got home he found her on the floor.  Pt has headache. Unsure if she has history of seizure. Pt unable to verbalize b/d. Or where she is at.  Was able to name her family at bedside.       Altered mental status     Altered Mental Status    ED 2025  20-year-old female presents for altered mental status. Found down this morning. Had last talked to her boyfriend on the phone approximately 30 to 40 minutes before being found. Initially alert and oriented x 0 now alert and oriented x 1. Remote history of seizures when she was on a medication but has not had any since she stopped that medication. States she has a headache and some slight chest pain. Apparently recently had a pneumothorax per family that is with her. See her birthday.     Observation 2025  Patient is alert and oriented x 1 this morning.  She recalls her first name but not her last name.  She can recall her birthday but is very slow to remember.  She is not oriented to place or situation.  She states to provider, \" they told me that I am in the hospital\". \"  I think I live with Fan, he told me he is my boyfriend\". She is pleasant. She thinks she has a history of seizures. She is alone in her room, no family at bedside. She endorses right side headache, rates pain 6/10 now.    Per chart review she has  a history of depression and suicidal behavior. Neurologist consulted by ED provider, will consider psych eval inpatient " but will wait on neurologist evaluation first.     Review of Systems   Psychiatric/Behavioral:  Positive for altered mental status.    Unable to assess        Personal History     Past Medical History:   Past Medical History:   Diagnosis Date    Allergic 2016    Last allergy test was with  at [facility] in [place] in 2024    Allergies     Anxiety 2024    Arthritis 2021    Due to multiple ankle sprains/surgery    Asthma     Depression 2025    Due to suicide attempt    Eating disorder 2019    Due to wrestling    Traumatic closed nondisplaced fracture of distal fibula, right, initial encounter 05/03/2021    Visual impairment 2019    Astigmatism       Surgical History:      Past Surgical History:   Procedure Laterality Date    LEG TENDON REPAIR Right 01/24/2022    Procedure: TENDON REPAIR FOOT/TOE -posterior tibial tendon repair, Tarsal Tunnel decompression, biopsy of soft tissue mass;  Surgeon: DAMION Hammer DPM;  Location: [facility] OR;  Service: Podiatry;  Laterality: Right;    LYMPH NODE BIOPSY Right     neck           Family History: family history includes Cancer in her maternal grandmother; Diabetes in her maternal grandmother; Heart attack in her maternal grandfather; Heart disease in her maternal grandfather and maternal grandmother; Hypertension in her maternal grandfather and maternal grandmother; Miscarriages / Stillbirths in her mother; No Known Problems in her father; Vision loss in her maternal grandmother. Otherwise pertinent FHx was reviewed and unremarkable.     Social History:  reports that she has never smoked. She has never been exposed to tobacco smoke. She has never used smokeless tobacco. She reports that she does not drink alcohol and does not use drugs.      Medications:  Prior to Admission medications    Medication Sig Start Date End Date Taking? Authorizing Provider   budesonide (Pulmicort) 0.5 MG/2ML nebulizer solution Take 2 mL by nebulization 2 (Two) Times  a Day. Dispense as 1 box of unit doses 11/20/24  Yes Eric Pierre MD   cetirizine (zyrTEC) 10 MG tablet Take 1 tablet by mouth Daily. 4/1/25  Yes Екатерина Alcocer MD   fluticasone (FLONASE) 50 MCG/ACT nasal spray Administer 2 sprays into the nostril(s) as directed by provider Daily.   Yes Екатерина Alcocer MD   fluvoxaMINE (LUVOX) 100 MG tablet Take 1 tablet by mouth Every Night. 4/3/25  Yes Екатерина Alcocer MD   lamoTRIgine (LaMICtal) 100 MG tablet Take 0.5 tablets by mouth Every 12 (Twelve) Hours. 4/3/25  Yes Екатерина Alcocer MD   Tezspire 210 MG/1.91ML solution auto-injector Inject 1.91 mL under the skin into the appropriate area as directed Every 30 (Thirty) Days.   Yes Екатерина Alcocer MD   tiotropium bromide-olodaterol (Stiolto Respimat) 2.5-2.5 MCG/ACT aerosol solution inhaler Inhale 1 puff Daily.   Yes Екатерина Alcocer MD   Vraylar 1.5 MG capsule capsule Take 1 capsule by mouth Daily. 4/11/25  Yes Екатерина Alcocer MD   albuterol (ACCUNEB) 0.63 MG/3ML nebulizer solution Take 3 mL by nebulization Every 6 (Six) Hours As Needed for Wheezing. 11/8/24   Marissa Vogel PA   hydrOXYzine pamoate (VISTARIL) 50 MG capsule Take 1 capsule by mouth 4 (Four) Times a Day As Needed. 3/14/25   Екатерина Alcocer MD   ipratropium-albuterol (DUO-NEB) 0.5-2.5 mg/3 ml nebulizer Take 3 mL by nebulization Every 4 (Four) Hours As Needed for Wheezing. 11/20/24   Eric Pierre MD   predniSONE (DELTASONE) 50 MG tablet Take 1 tablet by mouth Daily.    ProviderЕкатерина MD       Allergies:    Allergies   Allergen Reactions    Fluticasone Furoate-Vilanterol Seizure       Objective   Objective     Vital Signs  Temp:  [97.8 °F (36.6 °C)-98.3 °F (36.8 °C)] 97.9 °F (36.6 °C)  Heart Rate:  [] 78  Resp:  [16-20] 16  BP: ()/() 99/59  SpO2:  [96 %-100 %] 98 %  on   ;   Device (Oxygen Therapy): room air  Body mass index is 24.31 kg/m².    Physical Exam  Vitals and nursing note  reviewed.   Constitutional:       Appearance: Normal appearance.   HENT:      Head: Normocephalic and atraumatic.      Right Ear: External ear normal.      Left Ear: External ear normal.      Nose: Nose normal.      Mouth/Throat:      Mouth: Mucous membranes are moist.      Pharynx: Oropharynx is clear.   Eyes:      Extraocular Movements: Extraocular movements intact.   Cardiovascular:      Rate and Rhythm: Normal rate and regular rhythm.      Pulses: Normal pulses.      Heart sounds: Normal heart sounds.   Pulmonary:      Effort: Pulmonary effort is normal.      Breath sounds: Normal breath sounds.   Abdominal:      General: Abdomen is flat. Bowel sounds are normal.      Palpations: Abdomen is soft.   Musculoskeletal:         General: Normal range of motion.      Cervical back: Normal range of motion.   Skin:     General: Skin is warm.   Neurological:      General: No focal deficit present.      Mental Status: She is alert. She is disoriented.   Psychiatric:         Mood and Affect: Mood normal.         Behavior: Behavior normal.           Results Review:  I have personally reviewed most recent lab results and radiology images and interpretations and agree with findings, most notably: CK, CMP, TSH, hCG, lactate, CBC, acetaminophen, salicylate, ethanol, urine drug screen, chest x-ray, CT head.    Results from last 7 days   Lab Units 06/06/25  0513   WBC 10*3/mm3 5.85   HEMOGLOBIN g/dL 11.8*   HEMATOCRIT % 36.0   PLATELETS 10*3/mm3 284     Results from last 7 days   Lab Units 06/06/25  0513 06/05/25  1210 06/05/25  1206   SODIUM mmol/L 137  --  136   POTASSIUM mmol/L 3.9  --  4.0   CHLORIDE mmol/L 104  --  104   CO2 mmol/L 22.9  --  21.1*   BUN mg/dL 10.0  --  7.7   CREATININE mg/dL 0.78  --  0.73   GLUCOSE mg/dL 88  --  112*   CALCIUM mg/dL 9.1  --  9.3   ALK PHOS U/L  --   --  58   ALT (SGPT) U/L  --   --  28   AST (SGOT) U/L  --   --  29   LACTATE mmol/L  --  <0.3*  --      Estimated Creatinine Clearance: 127.9  mL/min (by C-G formula based on SCr of 0.78 mg/dL).  Brief Urine Lab Results  (Last result in the past 365 days)        Color   Clarity   Blood   Leuk Est   Nitrite   Protein   CREAT   Urine HCG        04/25/25 0931 Yellow   Clear   Negative   Negative   Negative   Trace                   Microbiology Results (last 10 days)       Procedure Component Value - Date/Time    Respiratory Panel PCR w/COVID-19(SARS-CoV-2) [facility] In-House, NP Swab in UTM/VTM, 2 HR TAT - Swab, Nasopharynx [[id number]]  (Normal) Collected: 06/02/25 1931    Lab Status: Final result Specimen: Swab from Nasopharynx Updated: 06/02/25 2029     ADENOVIRUS, PCR Not Detected     Coronavirus 229E Not Detected     Coronavirus HKU1 Not Detected     Coronavirus NL63 Not Detected     Coronavirus OC43 Not Detected     COVID19 Not Detected     Human Metapneumovirus Not Detected     Human Rhinovirus/Enterovirus Not Detected     Influenza A PCR Not Detected     Influenza B PCR Not Detected     Parainfluenza Virus 1 Not Detected     Parainfluenza Virus 2 Not Detected     Parainfluenza Virus 3 Not Detected     Parainfluenza Virus 4 Not Detected     RSV, PCR Not Detected     Bordetella pertussis pcr Not Detected     Bordetella parapertussis PCR Not Detected     Chlamydophila pneumoniae PCR Not Detected     Mycoplasma pneumo by PCR Not Detected    Narrative:      In the setting of a positive respiratory panel with a viral infection PLUS a negative procalcitonin without other underlying concern for bacterial infection, consider observing off antibiotics or discontinuation of antibiotics and continue supportive care. If the respiratory panel is positive for atypical bacterial infection (Bordetella pertussis, Chlamydophila pneumoniae, or Mycoplasma pneumoniae), consider antibiotic de-escalation to target atypical bacterial infection.            ECG/EMG Results (most recent)       Procedure Component Value Units Date/Time    ECG 12 Lead Other;  Unresponsive. [[id number]] Collected: 06/05/25 1145     Updated: 06/06/25 0623     QT Interval 341 ms      QTC Interval 422 ms     Narrative:      HEART RATE=92  bpm  RR Rmlgemvg=990  ms  WV Iisctddf=673  ms  P Horizontal Axis=82  deg  P Front Axis=9  deg  QRSD Interval=82  ms  QT Ucoyitmw=932  ms  ASxG=851  ms  QRS Axis=59  deg  T Wave Axis=34  deg  - NORMAL ECG -  Sinus rhythm  When compared with ECG of 27-Apr-2025 13:13:03,  No significant change  Electronically Signed By: Jarvis Deal ([facility]) 2025-06-06 06:22:50  Date and Time of Study:2025-06-05 11:45:26                No results found for this or any previous visit.      CT Head Without Contrast  Result Date: 6/5/2025  Impression: 1. No acute intracranial abnormality. 2. Low-lying cerebellar tonsils. Electronically Signed: Neto Dias MD  6/5/2025 1:57 PM EDT  Workstation ID: [id number]    XR Chest 1 View  Result Date: 6/5/2025  Impression: No acute process. Electronically Signed: Neto Dias MD  6/5/2025 1:23 PM EDT  Workstation ID: [id number]    MRI Brain Without Contrast  Result Date: 6/2/2025  Impression: No acute intracranial process identified. Electronically Signed: Shashank Donald MD  6/2/2025 9:54 PM EDT  Workstation ID: [id number]    XR Chest AP  Result Date: 6/2/2025  Impression: No acute cardiopulmonary process. Electronically Signed: Shashank Donald MD  6/2/2025 8:44 PM EDT  Workstation ID: [id number]        Estimated Creatinine Clearance: 127.9 mL/min (by C-G formula based on SCr of 0.78 mg/dL).    Assessment & Plan   Assessment/Plan       Active Hospital Problems    Diagnosis  POA    **Altered mental status, unspecified [R41.82]  Yes      Resolved Hospital Problems   No resolved problems to display.       Altered mental status  - CK, CMP, TSH, hCG, lactate, CBC, acetaminophen, salicylate within normal range  - Urine drug screen positive for THC  - UA pending  - Chest x-ray showed no acute process  - CT head showed no acute intracranial abnormality    - Recent MRI on 6/2/2025 showed no acute intracranial process  - In the ED patient given IV fluids  - Neurologist consulted by ED provider  - PT consulted  - Consider psych eval    Mood disorder  - Continue hydroxyzine, vraylar, lamictal and luvox    VTE Prophylaxis - Active VTE Prophylaxis  Mechanical:        Start        06/05/25 1541  Maintain Sequential Compression Device  Continuous                          Select Pharmacologic VTE Prophylaxis if Desired & Appropriate      CODE STATUS:    Code Status and Medical Interventions: CPR (Attempt to Resuscitate); Full Support   Ordered at: 06/05/25 1506     Code Status (Patient has no pulse and is not breathing):    CPR (Attempt to Resuscitate)     Medical Interventions (Patient has pulse or is breathing):    Full Support       This patient has been examined wearing personal protective equipment.     I discussed the patient's findings and my recommendations with patient and nursing staff.      Signature:Electronically signed by MARIAEELNA Isidro, 06/06/25, 10:51 AM EDT.

## 2025-06-06 NOTE — OUTREACH NOTE
Prep Survey      Flowsheet Row Responses   [facility] patient discharged from? [facility]   Is LACE score < 7 ? No   Eligibility [facility]   Date of Admission 06/05/25   Date of Discharge 06/06/25   Discharge Disposition Home or Self Care   Discharge diagnosis Altered mental status   Does the patient have one of the following disease processes/diagnoses(primary or secondary)? Other   Does the patient have Home health ordered? No   Is there a DME ordered? No   Prep survey completed? Yes            MARTIR RUBIO - Registered Nurse

## 2025-06-06 NOTE — PLAN OF CARE
Goal Outcome Evaluation:    Patient sleeping between care; denies needs
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
## 2025-06-06 NOTE — CONSULTS
Primary Care Provider: Provider, No Known     Consult requested by:  Paola Reyes PA-C     Reason for Consultation: Neurological evaluation      History taken from: patient chart RN    Chief complaint: Found down        SUBJECTIVE:    History of present illness: Background per H&P: Rand Asencio is a 20 y.o. female who presented to the ED on 6/5/25 after being found down at home. Pt is not able to provide many details because she is amnestic to the event, but states someone told her she called them prior to the event to let them know she couldn't breathe. She has asthma and has had several attacks over the past few months that have brought her to the ED. She thinks she may have had an asthma attack that caused her to pass out. The event was not witnessed. She states she was working from home, but she does not remember any details from the day before the spell. She denies any tongue bites or incontinence.    Pt states she believes she had seizures in the past while on a certain seizure medication, but has not had any since that med was stopped. She takes Lamictal for what she believes is bipolar disorder. She is groggy at this time and she is struggling with her memory.     She states she came to the ED last week for blurred vision after an asthma attack. Workup from that admission includes a negative MRI brain. She states she had a headache at the time and symptoms have since resolved.     Pt states she is compliant with her medications and does not believe she missed any prior to yesterday's event. She denies any recent lifestyle changes, medication changes, or significant stress.     Review of Systems   Constitutional:  Negative for chills, diaphoresis and fatigue.   Eyes:  Negative for photophobia and visual disturbance.   Neurological:  Positive for syncope. Negative for dizziness, tremors, seizures, facial asymmetry, speech difficulty, weakness, light-headedness, numbness and headaches.          PATIENT  HISTORY:  Past Medical History:   Diagnosis Date    Allergic 2016    Last allergy test was with  at [facility] in [place] in 2024    Allergies     Anxiety 2024    Arthritis 2021    Due to multiple ankle sprains/surgery    Asthma     Depression 2025    Due to suicide attempt    Eating disorder 2019    Due to wrestling    Traumatic closed nondisplaced fracture of distal fibula, right, initial encounter 05/03/2021    Visual impairment 2019    Astigmatism   ,   Past Surgical History:   Procedure Laterality Date    LEG TENDON REPAIR Right 01/24/2022    Procedure: TENDON REPAIR FOOT/TOE -posterior tibial tendon repair, Tarsal Tunnel decompression, biopsy of soft tissue mass;  Surgeon: DAMION Hammer DPM;  Location: [facility] OR;  Service: Podiatry;  Laterality: Right;    LYMPH NODE BIOPSY Right     neck   ,   Family History   Problem Relation Age of Onset    Miscarriages / Stillbirths Mother     No Known Problems Father     Diabetes Maternal Grandmother     Cancer Maternal Grandmother     Hypertension Maternal Grandmother     Heart disease Maternal Grandmother     Vision loss Maternal Grandmother     Heart disease Maternal Grandfather     Heart attack Maternal Grandfather     Hypertension Maternal Grandfather    ,   Social History     Tobacco Use    Smoking status: Never     Passive exposure: Never    Smokeless tobacco: Never   Vaping Use    Vaping status: Never Used   Substance Use Topics    Alcohol use: Never    Drug use: Never   ,   Prior to Admission medications    Medication Sig Start Date End Date Taking? Authorizing Provider   budesonide (Pulmicort) 0.5 MG/2ML nebulizer solution Take 2 mL by nebulization 2 (Two) Times a Day. Dispense as 1 box of unit doses 11/20/24  Yes Eric Pierre MD   cetirizine (zyrTEC) 10 MG tablet Take 1 tablet by mouth Daily. 4/1/25  Yes Provider, MD Екатерина   fluticasone (FLONASE) 50 MCG/ACT nasal spray Administer 2 sprays into the nostril(s) as  directed by provider Daily.   Yes Екатерина Alcocer MD   fluvoxaMINE (LUVOX) 100 MG tablet Take 1 tablet by mouth Every Night. 4/3/25  Yes Екатерина Alcocer MD   lamoTRIgine (LaMICtal) 100 MG tablet Take 0.5 tablets by mouth Every 12 (Twelve) Hours. 4/3/25  Yes Екатерина Alcocer MD   Tezspire 210 MG/1.91ML solution auto-injector Inject 1.91 mL under the skin into the appropriate area as directed Every 30 (Thirty) Days.   Yes Екатерина Alcocer MD   tiotropium bromide-olodaterol (Stiolto Respimat) 2.5-2.5 MCG/ACT aerosol solution inhaler Inhale 1 puff Daily.   Yes Екатерина Alcocer MD   Vraylar 1.5 MG capsule capsule Take 1 capsule by mouth Daily. 4/11/25  Yes Екатерина Alcocer MD   albuterol (ACCUNEB) 0.63 MG/3ML nebulizer solution Take 3 mL by nebulization Every 6 (Six) Hours As Needed for Wheezing. 11/8/24   Marissa Vogel PA   hydrOXYzine pamoate (VISTARIL) 50 MG capsule Take 1 capsule by mouth 4 (Four) Times a Day As Needed. 3/14/25   Екатерина Alcocer MD   ipratropium-albuterol (DUO-NEB) 0.5-2.5 mg/3 ml nebulizer Take 3 mL by nebulization Every 4 (Four) Hours As Needed for Wheezing. 11/20/24   Eric Pierre MD   predniSONE (DELTASONE) 50 MG tablet Take 1 tablet by mouth Daily.    ProviderЕкатерина MD    Allergies:  Fluticasone furoate-vilanterol    Current Facility-Administered Medications   Medication Dose Route Frequency Provider Last Rate Last Admin    acetaminophen (TYLENOL) tablet 650 mg  650 mg Oral Q4H PRN Paola Reyes PA-C   650 mg at 06/05/25 1720    Or    acetaminophen (TYLENOL) 160 MG/5ML oral solution 650 mg  650 mg Oral Q4H PRN Paola Reyes PA-C        Or    acetaminophen (TYLENOL) suppository 650 mg  650 mg Rectal Q4H PRN Paola Reyes PA-C        albuterol (ACCUNEB) nebulizer solution 0.63 mg  1 ampule Nebulization Q6H PRN Dorothea Arreguin APRN        aluminum-magnesium hydroxide-simethicone (MAALOX MAX) 400-400-40 MG/5ML suspension 15 mL  15 mL  Oral Q6H PRN Paola Reyes PA-C        sennosides-docusate (PERICOLACE) 8.6-50 MG per tablet 2 tablet  2 tablet Oral BID PRN Paola Reyes PA-C        And    polyethylene glycol (MIRALAX) packet 17 g  17 g Oral Daily PRN Paola Reyes PA-C        And    bisacodyl (DULCOLAX) EC tablet 5 mg  5 mg Oral Daily PRN Paola Reyes PA-C        And    bisacodyl (DULCOLAX) suppository 10 mg  10 mg Rectal Daily PRN Paola Reyes PA-C        budesonide (PULMICORT) nebulizer solution 0.5 mg  0.5 mg Nebulization BID Dorothea Arreguin APRN   0.5 mg at 06/06/25 1039    Cariprazine HCl (VRAYLAR) capsule capsule 1.5 mg  1.5 mg Oral Daily Dorothea Arreguin APRN   1.5 mg at 06/06/25 0805    cetirizine (zyrTEC) tablet 10 mg  10 mg Oral Daily Keenan Arreguina S, APRN   10 mg at 06/06/25 0805    fluvoxaMINE (LUVOX) tablet 100 mg  100 mg Oral Nightly Dorothea Arreguin APRN        hydrOXYzine (ATARAX) tablet 50 mg  50 mg Oral 4x Daily PRN Dorothea Arreguin APRN        ipratropium-albuterol (DUO-NEB) nebulizer solution 3 mL  3 mL Nebulization Q4H PRN Dorothea Arreguin APRN        lamoTRIgine (LaMICtal) tablet 50 mg  50 mg Oral Q12H TripKeenan konga S, APRN   50 mg at 06/06/25 0805    ondansetron ODT (ZOFRAN-ODT) disintegrating tablet 4 mg  4 mg Oral Q6H PRN Paola Reyes PA-C        Or    ondansetron (ZOFRAN) injection 4 mg  4 mg Intravenous Q6H PRN Paola Reyes PA-C        sodium chloride 0.9 % flush 10 mL  10 mL Intravenous PRN Jarvis Deal MD        sodium chloride 0.9 % flush 10 mL  10 mL Intravenous Q12H Paola Reyes PA-C   10 mL at 06/06/25 0805    sodium chloride 0.9 % flush 10 mL  10 mL Intravenous PRN Paola Reyes PA-C        sodium chloride 0.9 % infusion 40 mL  40 mL Intravenous PRN Paola Reyes PA-C            ________________________________________________________        OBJECTIVE:  Upon today's exam, pt is awake and alert. She is a little groggy and slow to respond, but  appropriate.     PHYSICAL EXAM:    CONSTITUTIONAL: The patient is in no apparent distress, awake and alert.      HEENT: There is no tenderness over the temporal arteries bilaterally. Normocephalic, atraumatic. TMJ open symmetrically without tenderness.    NECK: ROM normal, supple    RESPIRATORY: Breathing unlabored, Breath sounds are normal    CARDIAC: Regular rate and rhythm.     EXTREMITIES:  No clubbing, cyanosis or edema.    PSYCHIATRIC: Mood/affect flat, judgement normal, appropriate    NEUROLOGICAL:    Cognition:   Oriented to person and place. States it is March 2023  Fund of knowledge good.  Concentration and attention normal.   Language normal with normal comprehension, fluent speech, intact repetition and naming.   Poor historian     Cranial nerves;    II - pupils bilaterally equal reacting to light,  No new Visual field deficits;  Fundoscopic exam- Not able to be done, non-dilated exam  III,IV,VI: EOMI with no diplopia  V: Normal facial sensations  VII: No facial asymmetry,  VIII: No New hearing abnormality  IX, X, XI: normal gag and shoulder shrug;  XII: tongue is in the midline.    Sensory:  Intact to light touch in all extremities.     Motor: Strength 5/5 bilaterally upper and lower extremities. No involuntary movements present. Normal tone and bulk.  Deep tendon reflexes: 2/4 and symmetrical in biceps, brachioradialis, triceps, bilateral 2/4 knees and ankles. Both plantars are flexor.    Cerebellar: Finger to nose and mirror movements normal bilaterally.    Gait and balance: deferred            ________________________________________________________   RESULTS REVIEW:    VITAL SIGNS:   Temp:  [97.8 °F (36.6 °C)-98.3 °F (36.8 °C)] 97.9 °F (36.6 °C)  Heart Rate:  [] 78  Resp:  [16-20] 16  BP: ()/() 99/59     LABS:      Lab 06/06/25  0513 06/05/25  1210 06/05/25  1206 06/02/25 2052   WBC 5.85  --  6.77 18.99*   HEMOGLOBIN 11.8*  --  13.0 13.3   HEMATOCRIT 36.0  --  39.9 40.1    PLATELETS 284  --  340 360   NEUTROS ABS 2.83  --  4.59 14.28*   IMMATURE GRANS (ABS) 0.03  --  0.03 0.15*   LYMPHS ABS 2.14  --  1.45 3.30*   MONOS ABS 0.72  --  0.62 1.13*   EOS ABS 0.10  --  0.06 0.07   MCV 84.1  --  82.4 83.4   SED RATE  --   --   --  5   LACTATE  --  <0.3*  --   --          Lab 06/06/25  0513 06/05/25  1206 06/02/25 2052   SODIUM 137 136 136   POTASSIUM 3.9 4.0 3.3*   CHLORIDE 104 104 100   CO2 22.9 21.1* 21.1*   ANION GAP 10.1 10.9 14.9   BUN 10.0 7.7 7.0   CREATININE 0.78 0.73 0.74   EGFR 111.7 120.9 119.0   GLUCOSE 88 112* 88   CALCIUM 9.1 9.3 9.1   MAGNESIUM  --   --  2.2   TSH  --  1.170 2.010         Lab 06/05/25  1206   TOTAL PROTEIN 7.5   ALBUMIN 4.7   GLOBULIN 2.8   ALT (SGPT) 28   AST (SGOT) 29   BILIRUBIN 0.6   ALK PHOS 58                     UA          4/21/2025    17:20 4/25/2025    09:31 6/5/2025    13:18   Urinalysis   Squamous Epithelial Cells, UA   0-2    Specific Gravity, UA 1.012  1.025  1.010    Ketones, UA Negative  Negative  Negative    Blood, UA Negative  Negative  Moderate (2+)    Leukocytes, UA Negative  Negative  Trace    Nitrite, UA Negative  Negative  Negative    RBC, UA   0-2    WBC, UA   0-2    Bacteria, UA   None Seen        Lab Results   Component Value Date    TSH 1.170 06/05/2025    LDL 91 04/25/2025    HGBA1C 5.10 04/25/2025    [id number] 663 04/25/2025       IMAGING STUDIES:  CT Head Without Contrast  Result Date: 6/5/2025  Impression: 1. No acute intracranial abnormality. 2. Low-lying cerebellar tonsils. Electronically Signed: Neto Dias MD  6/5/2025 1:57 PM EDT  Workstation ID: [id number]    XR Chest 1 View  Result Date: 6/5/2025  Impression: No acute process. Electronically Signed: Neto Dias MD  6/5/2025 1:23 PM EDT  Workstation ID: [id number]      I reviewed the patient's new clinical results.    ________________________________________________________     PROBLEM LIST:    Altered mental status, unspecified        ASSESSMENT/PLAN:  1. Unresponsive  episode, found down. Possible syncope vs seizure. Pt states the spell was likely preceded by an asthma attack. She has a remote hx of seizure due to a medication and seizures stopped once the medication was stopped. No hx of epilepsy. The spell was not witnessed. CT shows low lying cerebellar tonsils which, in the setting of an asthma attack severe coughing spells, could lead to tussive syncope   - CT Head: No acute intracranial abnormality. Low-lying cerebellar tonsils.   - MRI brain (6/2/25): No acute intracranial process identified   - EKG:Sinus rhythm   - EEG can be done electively as outpt. EEG is not available at this facility on the weekends   - Labs: TSH: 1.17, mag: P, phos: P, sodium: 137, calcium: 9.1  - Seizures/Syncope precautions (see below)  - Pt is already on Lamictal so will continue. If she has any future events, consider increasing dose and follow up with neuro   - Consider outpt neurosurgery evaluation to further evaluate low-lying cerebellar tonsils with possible tussive syncope and frequent headaches.       SEIZURE/SYNCOPE INSTRUCTIONS:  -Recommended to observe all seizure precautions, including, but not limited to:   -Recommend no driving until seizure/syncope free for more than 3 months- Refer to local/state driving laws from licensing state;   -Avoid all high-risk activity, Take showers instead of baths, Avoid swimming without observation, Avoid open heat sources, Avoid working at heights, and Avoid engaging in all potentially hazardous activities.   - Maintain healthy lifestyle including stress management, routine sleep schedule, and a well balanced diet.  Alcohol and drugs lowers the seizure threshold and should be avoided. Take all medications as prescribed.  -Patient expressed clear understanding.    Will sign off. Please call with questions or concerns.       I discussed the patient's findings and my recommendations with patient, nursing staff, and consulting provider    Ashanti  MARIAELENA Rodriguez  06/06/25  11:47 EDT

## 2025-06-06 NOTE — DISCHARGE SUMMARY
"[facility]    Aida Reyes, MARIAELENA    CHIEF COMPLAINT:     Altered mental status     HISTORY OF PRESENT ILLNESS:    [facility]    ED 06/05/2025  20-year-old female presents for altered mental status. Found down this morning. Had last talked to her boyfriend on the phone approximately 30 to 40 minutes before being found. Initially alert and oriented x 0 now alert and oriented x 1. Remote history of seizures when she was on a medication but has not had any since she stopped that medication. States she has a headache and some slight chest pain. Apparently recently had a pneumothorax per family that is with her. See her birthday.      Observation 06/06/2025  Patient is alert and oriented x 1 this morning.  She recalls her first name but not her last name.  She can recall her birthday but is very slow to remember.  She is not oriented to place or situation.  She states to provider, \" they told me that I am in the hospital\". \"  I think I live with Fan, he told me he is my boyfriend\". She is pleasant. She thinks she has a history of seizures. She is alone in her room, no family at bedside. She endorses right side headache, rates pain 6/10 now.     Per chart review she has  a history of depression and suicidal behavior. Neurologist consulted by ED provider, will consider psych eval inpatient but will wait on neurologist evaluation first.     Observation 06/06/2025 pm  Reassessed this afternoon, still struggling with her memory.  Was evaluated by neurologist and psychiatrist and both of them signed off.  Family at bedside and comfortable with discharge today.  Family stated that she has had an appointment with ophthalmologist recently and has an ocular MRI ordered outpatient to rule out glaucoma.  Family states that she was prescribed eyedrops but has not yet picked up.    Past Medical History:   Diagnosis Date    Allergic 2016    Last allergy test was with  at [facility] in [facility] " [place] in 2024    Allergies     Anxiety 2024    Arthritis 2021    Due to multiple ankle sprains/surgery    Asthma     Depression 2025    Due to suicide attempt    Eating disorder 2019    Due to wrestling    Traumatic closed nondisplaced fracture of distal fibula, right, initial encounter 05/03/2021    Visual impairment 2019    Astigmatism     Past Surgical History:   Procedure Laterality Date    LEG TENDON REPAIR Right 01/24/2022    Procedure: TENDON REPAIR FOOT/TOE -posterior tibial tendon repair, Tarsal Tunnel decompression, biopsy of soft tissue mass;  Surgeon: DAMION Hammer DPM;  Location: [facility] MAIN OR;  Service: Podiatry;  Laterality: Right;    LYMPH NODE BIOPSY Right     neck     Family History   Problem Relation Age of Onset    Miscarriages / Stillbirths Mother     No Known Problems Father     Diabetes Maternal Grandmother     Cancer Maternal Grandmother     Hypertension Maternal Grandmother     Heart disease Maternal Grandmother     Vision loss Maternal Grandmother     Heart disease Maternal Grandfather     Heart attack Maternal Grandfather     Hypertension Maternal Grandfather      Social History     Tobacco Use    Smoking status: Never     Passive exposure: Never    Smokeless tobacco: Never   Vaping Use    Vaping status: Never Used   Substance Use Topics    Alcohol use: Never    Drug use: Never     Medications Prior to Admission   Medication Sig Dispense Refill Last Dose/Taking    budesonide (Pulmicort) 0.5 MG/2ML nebulizer solution Take 2 mL by nebulization 2 (Two) Times a Day. Dispense as 1 box of unit doses 2 mL 0 Taking    cetirizine (zyrTEC) 10 MG tablet Take 1 tablet by mouth Daily.   Taking    fluticasone (FLONASE) 50 MCG/ACT nasal spray Administer 2 sprays into the nostril(s) as directed by provider Daily.   Taking    fluvoxaMINE (LUVOX) 100 MG tablet Take 1 tablet by mouth Every Night.   Taking    lamoTRIgine (LaMICtal) 100 MG tablet Take 0.5 tablets by mouth Every 12 (Twelve) Hours.    Taking    Tezspire 210 MG/1.91ML solution auto-injector Inject 1.91 mL under the skin into the appropriate area as directed Every 30 (Thirty) Days.   Past Week    tiotropium bromide-olodaterol (Stiolto Respimat) 2.5-2.5 MCG/ACT aerosol solution inhaler Inhale 1 puff Daily.   Taking    Vraylar 1.5 MG capsule capsule Take 1 capsule by mouth Daily.   Taking    albuterol (ACCUNEB) 0.63 MG/3ML nebulizer solution Take 3 mL by nebulization Every 6 (Six) Hours As Needed for Wheezing. 60 mL 0     hydrOXYzine pamoate (VISTARIL) 50 MG capsule Take 1 capsule by mouth 4 (Four) Times a Day As Needed.       ipratropium-albuterol (DUO-NEB) 0.5-2.5 mg/3 ml nebulizer Take 3 mL by nebulization Every 4 (Four) Hours As Needed for Wheezing. 360 mL 0     predniSONE (DELTASONE) 50 MG tablet Take 1 tablet by mouth Daily.        Allergies:  Fluticasone furoate-vilanterol    Immunization History   Administered Date(s) Administered    COVID-19 (PFIZER) Purple Cap Monovalent 08/10/2021, 08/31/2021    DTaP 2005, 2005, 2005, 01/18/2007, 05/04/2012    HPV Quadrivalent 08/09/2016    Hep A, Unspecified 08/09/2016    Hepatitis B Adult/Adolescent IM 2005, 2005, 2005, 05/04/2012    Hib (PRP-OMP) 2005, 2005, 2005, 01/18/2007    IPV 2005, 2005, 2005, 05/04/2012    MMR 01/18/2007, 05/04/2012    Meningococcal MCV4P (Menactra) 08/09/2016    PEDS-Pneumococcal Conjugate (PCV7) 2005, 2005, 2005, 03/21/2006    Tdap 08/09/2016    Varicella 01/18/2007, 05/04/2012           REVIEW OF SYSTEMS:    ROS  Review of Systems   Psychiatric/Behavioral:  Positive for altered mental status.    Unable to assess    Vital Signs  Temp:  [97.8 °F (36.6 °C)-98.1 °F (36.7 °C)] 97.9 °F (36.6 °C)  Heart Rate:  [75-92] 78  Resp:  [16-20] 16  BP: ()/(47-71) 99/59          Physical Exam:  Physical Exam  Physical Exam  Vitals and nursing note reviewed.   Constitutional:       Appearance:  Normal appearance.   HENT:      Head: Normocephalic and atraumatic.      Right Ear: External ear normal.      Left Ear: External ear normal.      Nose: Nose normal.      Mouth/Throat:      Mouth: Mucous membranes are moist.      Pharynx: Oropharynx is clear.   Eyes:      Extraocular Movements: Extraocular movements intact.   Cardiovascular:      Rate and Rhythm: Normal rate and regular rhythm.      Pulses: Normal pulses.      Heart sounds: Normal heart sounds.   Pulmonary:      Effort: Pulmonary effort is normal.      Breath sounds: Normal breath sounds.   Abdominal:      General: Abdomen is flat. Bowel sounds are normal.      Palpations: Abdomen is soft.   Musculoskeletal:         General: Normal range of motion.      Cervical back: Normal range of motion.   Skin:     General: Skin is warm.   Neurological:      General: No focal deficit present.      Mental Status: She is alert. She is disoriented. A/o x1.   Psychiatric:         Mood and Affect: Mood normal.         Behavior: Behavior normal.        Emotional Behavior:    Normal    Debilities:   None  Results Review:    I reviewed the patient's new clinical results.  Lab Results (most recent)       Procedure Component Value Units Date/Time    Magnesium [[id number]]  (Normal) Collected: 06/06/25 0513    Specimen: Blood from Arm, Right Updated: 06/06/25 1227     Magnesium 2.2 mg/dL     Phosphorus [[id number]]  (Normal) Collected: 06/06/25 0513    Specimen: Blood from Arm, Right Updated: 06/06/25 1227     Phosphorus 4.0 mg/dL     Urinalysis With Culture If Indicated - Urine, Clean Catch [[id number]]  (Abnormal) Collected: 06/05/25 1318    Specimen: Urine, Clean Catch Updated: 06/06/25 1137     Color, UA Yellow     Appearance, UA Clear     pH, UA 8.0     Specific Gravity, UA 1.010     Glucose, UA Negative     Ketones, UA Negative     Bilirubin, UA Negative     Blood, UA Moderate (2+)     Protein, UA Negative     Leuk Esterase, UA Trace     Nitrite, UA Negative      Urobilinogen, UA 0.2 E.U./dL    Narrative:      In absence of clinical symptoms, the presence of pyuria, bacteria, and/or nitrites on the urinalysis result does not correlate with infection.    Urinalysis, Microscopic Only - Urine, Clean Catch [[id number]] Collected: 06/05/25 1318    Specimen: Urine, Clean Catch Updated: 06/06/25 1137     RBC, UA 0-2 /HPF      WBC, UA 0-2 /HPF      Comment: Urine culture not indicated.        Bacteria, UA None Seen /HPF      Squamous Epithelial Cells, UA 0-2 /HPF      Hyaline Casts, UA None Seen /LPF      Methodology Automated Microscopy    Basic Metabolic Panel [[id number]]  (Normal) Collected: 06/06/25 0513    Specimen: Blood from Arm, Right Updated: 06/06/25 0635     Glucose 88 mg/dL      BUN 10.0 mg/dL      Creatinine 0.78 mg/dL      Sodium 137 mmol/L      Potassium 3.9 mmol/L      Chloride 104 mmol/L      CO2 22.9 mmol/L      Calcium 9.1 mg/dL      BUN/Creatinine Ratio 12.8     Anion Gap 10.1 mmol/L      eGFR 111.7 mL/min/1.73     Narrative:      GFR Categories in Chronic Kidney Disease (CKD)              GFR Category          GFR (mL/min/1.73)    Interpretation  G1                    90 or greater        Normal or high (1)  G2                    60-89                Mild decrease (1)  G3a                   45-59                Mild to moderate decrease  G3b                   30-44                Moderate to severe decrease  G4                    15-29                Severe decrease  G5                    14 or less           Kidney failure    (1)In the absence of evidence of kidney disease, neither GFR category G1 or G2 fulfill the criteria for CKD.    eGFR calculation 2021 CKD-EPI creatinine equation, which does not include race as a factor    CBC & Differential [[id number]]  (Abnormal) Collected: 06/06/25 0513    Specimen: Blood from Arm, Right Updated: 06/06/25 0607    Narrative:      The following orders were created for panel order CBC & Differential.  Procedure                                Abnormality         Status                     ---------                               -----------         ------                     CBC Auto Differential[[id number]]        Abnormal            Final result                 Please view results for these tests on the individual orders.    CBC Auto Differential [[id number]]  (Abnormal) Collected: 06/06/25 0513    Specimen: Blood from Arm, Right Updated: 06/06/25 0607     WBC 5.85 10*3/mm3      RBC 4.28 10*6/mm3      Hemoglobin 11.8 g/dL      Hematocrit 36.0 %      MCV 84.1 fL      MCH 27.6 pg      MCHC 32.8 g/dL      RDW 13.0 %      RDW-SD 39.6 fl      MPV 10.2 fL      Platelets 284 10*3/mm3      Neutrophil % 48.4 %      Lymphocyte % 36.6 %      Monocyte % 12.3 %      Eosinophil % 1.7 %      Basophil % 0.5 %      Immature Grans % 0.5 %      Neutrophils, Absolute 2.83 10*3/mm3      Lymphocytes, Absolute 2.14 10*3/mm3      Monocytes, Absolute 0.72 10*3/mm3      Eosinophils, Absolute 0.10 10*3/mm3      Basophils, Absolute 0.03 10*3/mm3      Immature Grans, Absolute 0.03 10*3/mm3      nRBC 0.0 /100 WBC     Urine Drug Screen - Urine, Clean Catch [[id number]]  (Abnormal) Collected: 06/05/25 1318    Specimen: Urine, Clean Catch Updated: 06/05/25 1335     THC, Screen, Urine Positive     Phencyclidine (PCP), Urine Negative     Cocaine Screen, Urine Negative     Methamphetamine, Ur Negative     Opiate Screen Negative     Amphetamine Screen, Urine Negative     Benzodiazepine Screen, Urine Negative     Tricyclic Antidepressants Screen Negative     Methadone Screen, Urine Negative     Barbiturates Screen, Urine Negative     Oxycodone Screen, Urine Negative     Buprenorphine, Screen, Urine Negative    Narrative:      Cutoff For Drugs Screened:    Amphetamines               500 ng/ml  Barbiturates               200 ng/ml  Benzodiazepines            150 ng/ml  Cocaine                    150 ng/ml  Methadone                  200 ng/ml  Opiates                    100  ng/ml  Phencyclidine               25 ng/ml  THC                         50 ng/ml  Methamphetamine            500 ng/ml  Tricyclic Antidepressants  300 ng/ml  Oxycodone                  100 ng/ml  Buprenorphine               10 ng/ml    The normal value for all drugs tested is negative. This report includes unconfirmed screening results, with the cutoff values listed, to be used for medical treatment purposes only.  Unconfirmed results must not be used for non-medical purposes such as employment or legal testing.  Clinical consideration should be applied to any drug of abuse test, particularly when unconfirmed results are used.    All urine drugs of abuse requests without chain of custody are for medical screening purposes only.  False positives are possible.      Comprehensive Metabolic Panel [[id number]]  (Abnormal) Collected: 06/05/25 1206    Specimen: Blood Updated: 06/05/25 1252     Glucose 112 mg/dL      BUN 7.7 mg/dL      Creatinine 0.73 mg/dL      Sodium 136 mmol/L      Potassium 4.0 mmol/L      Chloride 104 mmol/L      CO2 21.1 mmol/L      Calcium 9.3 mg/dL      Total Protein 7.5 g/dL      Albumin 4.7 g/dL      ALT (SGPT) 28 U/L      AST (SGOT) 29 U/L      Alkaline Phosphatase 58 U/L      Total Bilirubin 0.6 mg/dL      Globulin 2.8 gm/dL      A/G Ratio 1.7 g/dL      BUN/Creatinine Ratio 10.5     Anion Gap 10.9 mmol/L      eGFR 120.9 mL/min/1.73     Narrative:      GFR Categories in Chronic Kidney Disease (CKD)              GFR Category          GFR (mL/min/1.73)    Interpretation  G1                    90 or greater        Normal or high (1)  G2                    60-89                Mild decrease (1)  G3a                   45-59                Mild to moderate decrease  G3b                   30-44                Moderate to severe decrease  G4                    15-29                Severe decrease  G5                    14 or less           Kidney failure    (1)In the absence of evidence of kidney  disease, neither GFR category G1 or G2 fulfill the criteria for CKD.    eGFR calculation 2021 CKD-EPI creatinine equation, which does not include race as a factor    TSH Rfx On Abnormal To Free T4 [[id number]]  (Normal) Collected: 06/05/25 1206    Specimen: Blood Updated: 06/05/25 1252     TSH 1.170 uIU/mL     CK [[id number]]  (Normal) Collected: 06/05/25 1206    Specimen: Blood Updated: 06/05/25 1252     Creatine Kinase 51 U/L     Ethanol [[id number]] Collected: 06/05/25 1206    Specimen: Blood Updated: 06/05/25 1252     Ethanol % <0.010 %     Narrative:      Plasma Ethanol Clinical Symptoms:    ETOH (%)               Clinical Symptom  .01-.05              No apparent influence  .03-.12              Euphoria, Diminished judgment and attention   .09-.25              Impaired comprehension, Muscle incoordination  .18-.30              Confusion, Staggered gait, Slurred speech  .25-.40              Markedly decreased response to stimuli, unable to stand or                        walk, vomitting, sleep or stupor  .35-.50              Comatose, Anesthesia, Subnormal body temperature        Salicylate Level [[id number]]  (Normal) Collected: 06/05/25 1206    Specimen: Blood Updated: 06/05/25 1252     Salicylate <0.5 mg/dL     Acetaminophen Level [[id number]]  (Normal) Collected: 06/05/25 1206    Specimen: Blood Updated: 06/05/25 1252     Acetaminophen <5.0 mcg/mL     Narrative:      Acetaminophen Therapeutic Range  5-20 ug/mL      Hours after ingestion            Toxic Value    4 Hours                           150 ug/mL    8 Hours                            70 ug/mL   12 Hours                            40 ug/mL   16 Hours                            20 ug/mL    These values apply to a single ingestion only.     hCG, Serum, Qualitative [[id number]]  (Normal) Collected: 06/05/25 1206    Specimen: Blood Updated: 06/05/25 1237     HCG Qualitative Negative    POC Lactate [[id number]]  (Abnormal) Collected: 06/05/25 1210    Specimen:  Blood Updated: 06/05/25 1214     Lactate <0.3 mmol/L      Comment: Serial Number: [id number]:  [id number]       CBC & Differential [[id number]]  (Normal) Collected: 06/05/25 1206    Specimen: Blood Updated: 06/05/25 1212    Narrative:      The following orders were created for panel order CBC & Differential.  Procedure                               Abnormality         Status                     ---------                               -----------         ------                     CBC Auto Differential[[id number]]        Normal              Final result                 Please view results for these tests on the individual orders.    CBC Auto Differential [[id number]]  (Normal) Collected: 06/05/25 1206    Specimen: Blood Updated: 06/05/25 1212     WBC 6.77 10*3/mm3      RBC 4.84 10*6/mm3      Hemoglobin 13.0 g/dL      Hematocrit 39.9 %      MCV 82.4 fL      MCH 26.9 pg      MCHC 32.6 g/dL      RDW 12.8 %      RDW-SD 38.5 fl      MPV 9.8 fL      Platelets 340 10*3/mm3      Neutrophil % 67.8 %      Lymphocyte % 21.4 %      Monocyte % 9.2 %      Eosinophil % 0.9 %      Basophil % 0.3 %      Immature Grans % 0.4 %      Neutrophils, Absolute 4.59 10*3/mm3      Lymphocytes, Absolute 1.45 10*3/mm3      Monocytes, Absolute 0.62 10*3/mm3      Eosinophils, Absolute 0.06 10*3/mm3      Basophils, Absolute 0.02 10*3/mm3      Immature Grans, Absolute 0.03 10*3/mm3      nRBC 0.0 /100 WBC     POC Glucose Once [[id number]]  (Abnormal) Collected: 06/05/25 1159    Specimen: Blood Updated: 06/05/25 1203     Glucose 108 mg/dL      Comment: Serial Number: [id number]:  [id number]               Imaging Results (Most Recent)       Procedure Component Value Units Date/Time    CT Head Without Contrast [[id number]] Collected: 06/05/25 1354     Updated: 06/05/25 1359    Narrative:      CT HEAD WO CONTRAST    Date of Exam: 6/5/2025 1:40 PM EDT    Indication: ams.    Comparison: MRI brain 6/2/2025    Technique: Axial CT images were obtained  of the head without contrast administration.  Coronal reconstructions were performed.  Automated exposure control and iterative reconstruction methods were used.    Findings:  Negative for intracranial hemorrhage. No mass effect or midline shift. Ventricles and cortical sulci normally configured. Posterior fossa without acute abnormality. No abnormal extra-axial fluid collection. Mildly low-lying cerebellar tonsils. Globes   intact and symmetric. No retro-orbital abnormality. The mastoid air cells are well-aerated. Mild sinus thickening at the left ethmoid air cells. No sinus fluid level. Calvarium intact.      Impression:      Impression:  1. No acute intracranial abnormality.  2. Low-lying cerebellar tonsils.          Electronically Signed: Neto Dias MD    6/5/2025 1:57 PM EDT    Workstation ID: [id number]    XR Chest 1 View [[id number]] Collected: 06/05/25 1322     Updated: 06/05/25 1325    Narrative:      XR CHEST 1 VW    Date of Exam: 6/5/2025 12:54 PM EDT    Indication: chest pain    Comparison: 6/2/2025    Findings:  The cardiomediastinal silhouette is within normal limits. Lungs are clear. No focal consolidation, pneumothorax, or significant pleural effusion. Osseous structures grossly intact. Calcified right lower lobe nodule compatible with benign granuloma with   calcified right hilar granulomatous lymph node.      Impression:      Impression:  No acute process.          Electronically Signed: Neto Dias MD    6/5/2025 1:23 PM EDT    Workstation ID: [id number]          reviewed    ECG/EMG Results (most recent)       Procedure Component Value Units Date/Time    ECG 12 Lead Other; Unresponsive. [[id number]] Collected: 06/05/25 1145     Updated: 06/06/25 0623     QT Interval 341 ms      QTC Interval 422 ms     Narrative:      HEART RATE=92  bpm  RR Iegfskgs=137  ms  RI Mhkmfhmu=099  ms  P Horizontal Axis=82  deg  P Front Axis=9  deg  QRSD Interval=82  ms  QT Owlplvtf=454  ms  ZTpO=843  ms  QRS Axis=59  deg  T  Wave Axis=34  deg  - NORMAL ECG -  Sinus rhythm  When compared with ECG of 27-Apr-2025 13:13:03,  No significant change  Electronically Signed By: Jarvis Deal (NEGRO) 2025-06-06 06:22:50  Date and Time of Study:2025-06-05 11:45:26          reviewed        No results found for this or any previous visit.      Microbiology Results (last 10 days)       Procedure Component Value - Date/Time    Respiratory Panel PCR w/COVID-19(SARS-CoV-2) [facility] In-House, NP Swab in UTM/VTM, 2 HR TAT - Swab, Nasopharynx [[id number]]  (Normal) Collected: 06/02/25 1931    Lab Status: Final result Specimen: Swab from Nasopharynx Updated: 06/02/25 2029     ADENOVIRUS, PCR Not Detected     Coronavirus 229E Not Detected     Coronavirus HKU1 Not Detected     Coronavirus NL63 Not Detected     Coronavirus OC43 Not Detected     COVID19 Not Detected     Human Metapneumovirus Not Detected     Human Rhinovirus/Enterovirus Not Detected     Influenza A PCR Not Detected     Influenza B PCR Not Detected     Parainfluenza Virus 1 Not Detected     Parainfluenza Virus 2 Not Detected     Parainfluenza Virus 3 Not Detected     Parainfluenza Virus 4 Not Detected     RSV, PCR Not Detected     Bordetella pertussis pcr Not Detected     Bordetella parapertussis PCR Not Detected     Chlamydophila pneumoniae PCR Not Detected     Mycoplasma pneumo by PCR Not Detected    Narrative:      In the setting of a positive respiratory panel with a viral infection PLUS a negative procalcitonin without other underlying concern for bacterial infection, consider observing off antibiotics or discontinuation of antibiotics and continue supportive care. If the respiratory panel is positive for atypical bacterial infection (Bordetella pertussis, Chlamydophila pneumoniae, or Mycoplasma pneumoniae), consider antibiotic de-escalation to target atypical bacterial infection.            Assessment & Plan     Altered mental status, unspecified          Altered mental  status  - CK, CMP, TSH, hCG, lactate, CBC, acetaminophen, salicylate within normal range  - Urine drug screen positive for THC  - UA negative for UTI  - Chest x-ray showed no acute process  - CT head showed no acute intracranial abnormality   - Recent MRI on 6/2/2025 showed no acute intracranial process  - In the ED patient given IV fluids  - Neurologist consulted by ED provider and signed off, recommended outpatient f/u. Referral given to neuro   -Psych consulted and recommended dc on current meds.     Blurry vision  -BP stable  -Recent ct/mri brain negative   -Per family has seen ophthalmologist and they suspected glaucoma. Prescribed eye drops and will be doing ocular MRI outpatient.   -F/u with ophthalmologist next scheduled appt      Mood disorder  - Continue hydroxyzine, vraylar, lamictal and luvox       I discussed the patients findings and my recommendations with patient and nursing staff.     Discharge Diagnosis:      Altered mental status, unspecified      Hospital Course  Patient is a 20 y.o. female presented with altered mental status as noted HPI above. CK, CMP, TSH, hCG, lactate, CBC, acetaminophen, salicylate within normal range.  Urine drug screen positive for THC.  Chest x-ray negative and CT head showed no acute intracranial normality.  Recent MRI was negative.  Patient was admitted to the observation unit for IV fluids and neurologist consult.  Neurologist recommended outpatient follow-up with neuro.  Psych was also consulted and recommended discharge home on current meds which are hydroxyzine, Vraylar, Lamictal and Luvox. At this time, patient felt to be in good condition for discharge with close follow up with PCP. Instructed to take all medications as prescribed and to return to ED if any concerning signs/symptoms. All test/lab results were discussed with patient. All questions were answered and patient verbalizes understanding.       Past Medical History:     Past Medical History:   Diagnosis  Date    Allergic 2016    Last allergy test was with  at [facility] [place] in 2024    Allergies     Anxiety 2024    Arthritis 2021    Due to multiple ankle sprains/surgery    Asthma     Depression 2025    Due to suicide attempt    Eating disorder 2019    Due to wrestling    Traumatic closed nondisplaced fracture of distal fibula, right, initial encounter 05/03/2021    Visual impairment 2019    Astigmatism       Past Surgical History:     Past Surgical History:   Procedure Laterality Date    LEG TENDON REPAIR Right 01/24/2022    Procedure: TENDON REPAIR FOOT/TOE -posterior tibial tendon repair, Tarsal Tunnel decompression, biopsy of soft tissue mass;  Surgeon: DAMION Hammer DPM;  Location: [facility] MAIN OR;  Service: Podiatry;  Laterality: Right;    LYMPH NODE BIOPSY Right     neck       Social History:   Social History     Socioeconomic History    Marital status: Single   Tobacco Use    Smoking status: Never     Passive exposure: Never    Smokeless tobacco: Never   Vaping Use    Vaping status: Never Used   Substance and Sexual Activity    Alcohol use: Never    Drug use: Never    Sexual activity: Yes     Partners: Male     Birth control/protection: Condom       Procedures Performed         Consults:   Consults       Date and Time Order Name Status Description    6/6/2025 11:30 AM Inpatient Psychiatrist Consult Completed     6/5/2025  3:04 PM Inpatient Neurology Consult General Completed             Condition on Discharge:     Stable    Discharge Disposition  Home or Self Care    Discharge Medications     Discharge Medications        Continue These Medications        Instructions Start Date   albuterol 0.63 MG/3ML nebulizer solution  Commonly known as: ACCUNEB   0.63 mg, Nebulization, Every 6 Hours PRN      budesonide 0.5 MG/2ML nebulizer solution  Commonly known as: Pulmicort   0.5 mg, Nebulization, 2 Times Daily, Dispense as 1 box of unit doses      cetirizine 10 MG tablet  Commonly  known as: zyrTEC   10 mg, Daily      fluticasone 50 MCG/ACT nasal spray  Commonly known as: FLONASE   2 sprays, Daily      fluvoxaMINE 100 MG tablet  Commonly known as: LUVOX   100 mg, Oral, Nightly      hydrOXYzine pamoate 50 MG capsule  Commonly known as: VISTARIL   50 mg, 4 Times Daily PRN      ipratropium-albuterol 0.5-2.5 mg/3 ml nebulizer  Commonly known as: DUO-NEB   3 mL, Nebulization, Every 4 Hours PRN      lamoTRIgine 100 MG tablet  Commonly known as: LaMICtal   0.5 tablets, Every 12 Hours Scheduled      predniSONE 50 MG tablet  Commonly known as: DELTASONE   50 mg, Oral, Daily      Stiolto Respimat 2.5-2.5 MCG/ACT aerosol solution inhaler  Generic drug: tiotropium bromide-olodaterol   1 puff, Daily - RT      Tezspire 210 MG/1.91ML solution auto-injector  Generic drug: Tezepelumab-ekko   Inject 1.91 mL under the skin into the appropriate area as directed Every 30 (Thirty) Days.      Vraylar 1.5 MG capsule capsule  Generic drug: Cariprazine HCl   1 capsule, Daily               Discharge Diet:   Diet Instructions       Diet: Regular/House Diet; Regular (IDDSI 7); Thin (IDDSI 0)      Discharge Diet: Regular/House Diet    Texture: Regular (IDDSI 7)    Fluid Consistency: Thin (IDDSI 0)            Activity at Discharge:     Follow-up Appointments  Future Appointments   Date Time Provider Department [place]   8/15/2025  8:00 AM Aida Reyes APRN [facility]     Additional Instructions for the Follow-ups that You Need to Schedule       Ambulatory Referral to Neurology   As directed      Discharge Follow-up with PCP   As directed       Currently Documented PCP:    Aida Reyes APRN    PCP Phone Number:    [phone]     Follow Up Details: 5-7 days        Discharge Follow-up with Specified Provider: Ophthalmologist   As directed      To: Ophthalmologist   Follow Up Details: next scheduled appt        Discharge Follow-up with Specified Provider: Psychiatrist   As directed      To: Psychiatrist    Follow Up Details: next scheduled appt                Test Results Pending at Discharge  Pending Results       None             Risk for Readmission (LACE) Score: 7 (6/6/2025  6:00 AM)      Greater than 30 minutes spent in discharge activities for this patient    Signature:Electronically signed by MARIAELENA Isidro, 06/06/25, 2:29 PM EDT.

## 2025-06-09 ENCOUNTER — TRANSITIONAL CARE MANAGEMENT TELEPHONE ENCOUNTER (OUTPATIENT)
Dept: CALL CENTER | Facility: HOSPITAL | Age: 20
End: 2025-06-09
Payer: COMMERCIAL

## 2025-06-09 NOTE — OUTREACH NOTE
Call Center TCM Note      Flowsheet Row Responses   [facility] patient discharged from? [facility]   Does the patient have one of the following disease processes/diagnoses(primary or secondary)? Other   TCM attempt successful? No   Unsuccessful attempts Attempt 1            JANESSA Cowan Medical Assistant    6/9/2025, 10:03 EDT

## 2025-06-09 NOTE — OUTREACH NOTE
Call Center TCM Note      Flowsheet Row Responses   [facility] patient discharged from? [facility]   Does the patient have one of the following disease processes/diagnoses(primary or secondary)? Other   TCM attempt successful? Yes   Call start time 1556   Call end time 1558   Discharge diagnosis Altered mental status   Meds reviewed with patient/caregiver? Yes   Does the patient have all medications ordered at discharge? N/A   Prescription comments No changes to pt current medications at this hospital discharge   Is the patient taking all medications as directed (includes completed medication regime)? Yes   Does the patient have an appointment with their PCP within 7-14 days of discharge? Yes   Has home health visited the patient within 72 hours of discharge? N/A   Psychosocial issues? No   Did the patient receive a copy of their discharge instructions? Yes   Nursing interventions Reviewed instructions with patient   What is the patient's perception of their health status since discharge? Same   Is the patient/caregiver able to teach back signs and symptoms related to disease process for when to call PCP? Yes   Is the patient/caregiver able to teach back signs and symptoms related to disease process for when to call 911? Yes   Is the patient/caregiver able to teach back the hierarchy of who to call/visit for symptoms/problems? PCP, Specialist, Home health nurse, Urgent Care, ED, 911 Yes   If the patient is a current smoker, are they able to teach back resources for cessation? Not a smoker   TCM call completed? Yes   Wrap up additional comments D/C DX: AMS,  confusion,  seizures,  memory loss - Pt states she is very tired. Pt states she is told by family she is still having seizures, and memory loss. Pt does know she has NP Psychiatry appt tomorrow, and TCM APPT with PCP MARIAELENA Reyes 06/17/2025.   Call end time 1558            JANESSA SCALES - Medical Assistant    6/9/2025, 16:00 EDT

## 2025-06-17 ENCOUNTER — READMISSION MANAGEMENT (OUTPATIENT)
Dept: CALL CENTER | Facility: HOSPITAL | Age: 20
End: 2025-06-17
Payer: COMMERCIAL

## 2025-06-17 ENCOUNTER — OFFICE VISIT (OUTPATIENT)
Dept: FAMILY MEDICINE CLINIC | Facility: CLINIC | Age: 20
End: 2025-06-17
Payer: COMMERCIAL

## 2025-06-17 VITALS
TEMPERATURE: 98.6 F | OXYGEN SATURATION: 100 % | HEART RATE: 89 BPM | DIASTOLIC BLOOD PRESSURE: 60 MMHG | WEIGHT: 152.2 LBS | SYSTOLIC BLOOD PRESSURE: 107 MMHG | RESPIRATION RATE: 16 BRPM | BODY MASS INDEX: 23.89 KG/M2 | HEIGHT: 67 IN

## 2025-06-17 DIAGNOSIS — R44.1 HALLUCINATIONS, VISUAL: Primary | ICD-10-CM

## 2025-06-17 DIAGNOSIS — R41.82 ALTERED MENTAL STATUS, UNSPECIFIED ALTERED MENTAL STATUS TYPE: ICD-10-CM

## 2025-06-17 PROBLEM — H50.51 ESOPHORIA: Status: ACTIVE | Noted: 2025-06-03

## 2025-06-17 PROBLEM — H57.10: Status: ACTIVE | Noted: 2025-06-03

## 2025-06-17 NOTE — OUTREACH NOTE
Medical Week 2 Survey      Flowsheet Row Responses   [facility] facility patient discharged from? [facility]   Does the patient have one of the following disease processes/diagnoses(primary or secondary)? Other   Week 2 attempt successful? No   Unsuccessful attempts Attempt 1            Liane GOMES - Registered Nurse

## 2025-06-17 NOTE — ASSESSMENT & PLAN NOTE
Very blunt affect.  Saw Psych MD, last week and increased lamictal.  Patient is scheduled to see  Therapist later this week or next.  Contact information for Dr Seipel provided to patient and boyfriend.  Referral to Dr Seipel placed during recent hospitalization.  Highly encouraged patient to reach out to Dr Seipel office to schedule appointment.  Boyfriend and patient verbalized understanding.

## 2025-06-17 NOTE — ASSESSMENT & PLAN NOTE
Patient is poor historian.  Boyfriend and primary care giver is present and states that patient has reported seeing spiders for past 2 days.  Psych MD increased Lamictal to 100mg BID, last week, after hospitalization.    Patient denies SI/HI.  Boyfriend states that patient is never left alone and someone is always with her.

## 2025-06-17 NOTE — PROGRESS NOTES
Transitional Care Follow Up Visit  Subjective     Rand Asencio is a 20 y.o. female who presents for a transitional care management visit.    Within 48 business hours after discharge our office contacted her via telephone to coordinate her care and needs.      I reviewed and discussed the details of that call along with the discharge summary, hospital problems, inpatient lab results, inpatient diagnostic studies, and consultation reports with Rand.     Current outpatient and discharge medications have been reconciled for the patient.  Reviewed by: MARIAELENA Kauffman          6/6/2025     5:57 PM   Date of TCM Phone Call   [facility]   Date of Admission 6/5/2025   Date of Discharge 6/6/2025   Discharge Disposition Home or Self Care     Risk for Readmission (LACE) Score: 7 (6/6/2025  6:00 AM)      History of Present Illness  21 y/o Rnad presents to  for f/u from hospitalization.  Saw Psych MD, last Tuesday - increased Lamictal.  Information provided by Steff, emergency contact (boyfriend's mother) and boyfriend, Melvin.     Course During Hospital Stay:  Hospital Course  Patient is a 20 y.o. female presented with altered mental status as noted HPI above. CK, CMP, TSH, hCG, lactate, CBC, acetaminophen, salicylate within normal range.  Urine drug screen positive for THC.  Chest x-ray negative and CT head showed no acute intracranial normality.  Recent MRI was negative.  Patient was admitted to the observation unit for IV fluids and neurologist consult.  Neurologist recommended outpatient follow-up with neuro.  Psych was also consulted and recommended discharge home on current meds which are hydroxyzine, Vraylar, Lamictal and Luvox. At this time, patient felt to be in good condition for discharge with close follow up with PCP. Instructed to take all medications as prescribed and to return to ED if any concerning signs/symptoms. All test/lab results were discussed with patient. All questions  "were answered and patient verbalizes understanding.      The following portions of the patient's history were reviewed and updated as appropriate: allergies, current medications, past family history, past medical history, past social history, past surgical history, and problem list.    Review of Systems   Constitutional:  Positive for fatigue.   Respiratory:  Negative for chest tightness and shortness of breath.    Cardiovascular:  Negative for chest pain.   Neurological:  Positive for dizziness, seizures, weakness and headaches.   Psychiatric/Behavioral:  Positive for agitation, confusion, hallucinations and sleep disturbance. Negative for self-injury and suicidal ideas. The patient is nervous/anxious.        Objective   /60 (BP Location: Left arm, Patient Position: Sitting, Cuff Size: Adult)   Pulse 89   Temp 98.6 °F (37 °C) (Infrared)   Resp 16   Ht 170.2 cm (67\")   Wt 69 kg (152 lb 3.2 oz)   SpO2 100%   BMI 23.84 kg/m²   Physical Exam  HENT:      Head: Normocephalic and atraumatic.   Eyes:      Extraocular Movements: Extraocular movements intact.      Conjunctiva/sclera: Conjunctivae normal.      Pupils: Pupils are equal, round, and reactive to light.   Cardiovascular:      Rate and Rhythm: Regular rhythm.      Pulses: Normal pulses.   Pulmonary:      Effort: Pulmonary effort is normal.      Breath sounds: Normal breath sounds and air entry. No stridor. No decreased breath sounds, wheezing, rhonchi or rales.   Musculoskeletal:         General: Normal range of motion.      Right lower leg: No edema.      Left lower leg: No edema.   Skin:     General: Skin is warm and dry.      Capillary Refill: Capillary refill takes less than 2 seconds.   Neurological:      General: No focal deficit present.      Mental Status: She is alert and oriented to person, place, and time.      Motor: No weakness.      Gait: Gait normal.   Psychiatric:         Attention and Perception: She perceives visual hallucinations.    "      Mood and Affect: Mood is depressed. Affect is blunt, flat and tearful.         Speech: Speech is delayed.         Behavior: Behavior is slowed and withdrawn. Behavior is cooperative.         Thought Content: Thought content is not paranoid or delusional. Thought content does not include homicidal or suicidal ideation. Thought content does not include homicidal or suicidal plan.         Cognition and Memory: Cognition is impaired. Memory is impaired. She exhibits impaired recent memory and impaired remote memory.         Assessment & Plan   Diagnoses and all orders for this visit:    1. Hallucinations, visual (Primary)  Assessment & Plan:  Patient is poor historian.  Boyfriend and primary care giver is present and states that patient has reported seeing spiders for past 2 days.  Psych MD increased Lamictal to 100mg BID, last week, after hospitalization.    Patient denies SI/HI.  Boyfriend states that patient is never left alone and someone is always with her.      2. Altered mental status, unspecified altered mental status type  Assessment & Plan:  Very blunt affect.  Saw Psych MD, last week and increased lamictal.  Patient is scheduled to see  Therapist later this week or next.  Contact information for Dr Seipel provided to patient and boyfriend.  Referral to Dr Seipel placed during recent hospitalization.  Highly encouraged patient to reach out to Dr Seipel office to schedule appointment.  Boyfriend and patient verbalized understanding.                 Current Outpatient Medications on File Prior to Visit   Medication Sig Dispense Refill    albuterol (ACCUNEB) 0.63 MG/3ML nebulizer solution Take 3 mL by nebulization Every 6 (Six) Hours As Needed for Wheezing. 60 mL 0    budesonide (Pulmicort) 0.5 MG/2ML nebulizer solution Take 2 mL by nebulization 2 (Two) Times a Day. Dispense as 1 box of unit doses 2 mL 0    cetirizine (zyrTEC) 10 MG tablet Take 1 tablet by mouth Daily.      fluvoxaMINE (LUVOX) 100 MG  tablet Take 1 tablet by mouth Every Night.      hydrOXYzine pamoate (VISTARIL) 50 MG capsule Take 1 capsule by mouth 4 (Four) Times a Day As Needed.      ipratropium-albuterol (DUO-NEB) 0.5-2.5 mg/3 ml nebulizer Take 3 mL by nebulization Every 4 (Four) Hours As Needed for Wheezing. 360 mL 0    lamoTRIgine (LaMICtal) 100 MG tablet Take 1 tablet by mouth Every 12 (Twelve) Hours.      Tezspire 210 MG/1.91ML solution auto-injector Inject 1.91 mL under the skin into the appropriate area as directed Every 30 (Thirty) Days.      tiotropium bromide-olodaterol (Stiolto Respimat) 2.5-2.5 MCG/ACT aerosol solution inhaler Inhale 1 puff Daily.      Vraylar 1.5 MG capsule capsule Take 1 capsule by mouth Daily.      [DISCONTINUED] predniSONE (DELTASONE) 50 MG tablet Take 1 tablet by mouth Daily.      [DISCONTINUED] fluticasone (FLONASE) 50 MCG/ACT nasal spray Administer 2 sprays into the nostril(s) as directed by provider Daily. (Patient not taking: Reported on 6/17/2025)       No current facility-administered medications on file prior to visit.

## 2025-06-17 NOTE — LETTER
June 17, 2025     Patient: Rand Asencio   YOB: 2005   Date of Visit: 6/17/2025       To Whom It May Concern:    It is my medical opinion that patient's primary care provider, Vesna Finley needs be available and provide daily care for my patient.           Sincerely,        MARIAELENA Kauffman    CC: No Recipients

## 2025-06-24 ENCOUNTER — READMISSION MANAGEMENT (OUTPATIENT)
Dept: CALL CENTER | Facility: HOSPITAL | Age: 20
End: 2025-06-24
Payer: COMMERCIAL

## 2025-06-24 NOTE — OUTREACH NOTE
Medical Week 3 Survey      Flowsheet Row Responses   [facility] facility patient discharged from? [facility]   Does the patient have one of the following disease processes/diagnoses(primary or secondary)? Other   Week 3 attempt successful? No   Unsuccessful attempts Attempt 1   oke Nicolas MORAES - Registered Nurse

## 2025-06-30 ENCOUNTER — APPOINTMENT (OUTPATIENT)
Dept: GENERAL RADIOLOGY | Facility: HOSPITAL | Age: 20
End: 2025-06-30
Payer: COMMERCIAL

## 2025-06-30 ENCOUNTER — APPOINTMENT (OUTPATIENT)
Dept: NEUROLOGY | Facility: HOSPITAL | Age: 20
End: 2025-06-30
Payer: COMMERCIAL

## 2025-06-30 ENCOUNTER — HOSPITAL ENCOUNTER (INPATIENT)
Facility: HOSPITAL | Age: 20
LOS: 8 days | Discharge: PSYCHIATRIC OR SUBSTANCE USE FACILITY | End: 2025-07-08
Attending: STUDENT IN AN ORGANIZED HEALTH CARE EDUCATION/TRAINING PROGRAM | Admitting: STUDENT IN AN ORGANIZED HEALTH CARE EDUCATION/TRAINING PROGRAM
Payer: COMMERCIAL

## 2025-06-30 DIAGNOSIS — D72.829 LEUKOCYTOSIS, UNSPECIFIED TYPE: ICD-10-CM

## 2025-06-30 DIAGNOSIS — T50.902A INTENTIONAL OVERDOSE, INITIAL ENCOUNTER: ICD-10-CM

## 2025-06-30 DIAGNOSIS — F32.A DEPRESSION, UNSPECIFIED DEPRESSION TYPE: ICD-10-CM

## 2025-06-30 DIAGNOSIS — T50.902A SUICIDE ATTEMPT BY DRUG INGESTION, INITIAL ENCOUNTER: Primary | ICD-10-CM

## 2025-06-30 DIAGNOSIS — R56.9 SEIZURE: ICD-10-CM

## 2025-06-30 LAB
ALBUMIN SERPL-MCNC: 4.3 G/DL (ref 3.5–5.2)
ALBUMIN SERPL-MCNC: 4.8 G/DL (ref 3.5–5.2)
ALBUMIN/GLOB SERPL: 1.7 G/DL
ALBUMIN/GLOB SERPL: 2 G/DL
ALP SERPL-CCNC: 49 U/L (ref 39–117)
ALP SERPL-CCNC: 54 U/L (ref 39–117)
ALT SERPL W P-5'-P-CCNC: 17 U/L (ref 1–33)
ALT SERPL W P-5'-P-CCNC: 21 U/L (ref 1–33)
AMPHET+METHAMPHET UR QL: NEGATIVE
AMPHETAMINES UR QL: NEGATIVE
ANION GAP SERPL CALCULATED.3IONS-SCNC: 15.4 MMOL/L (ref 5–15)
ANION GAP SERPL CALCULATED.3IONS-SCNC: 20.4 MMOL/L (ref 5–15)
APAP SERPL-MCNC: <5 MCG/ML (ref 0–30)
ARTERIAL PATENCY WRIST A: POSITIVE
AST SERPL-CCNC: 31 U/L (ref 1–32)
AST SERPL-CCNC: 34 U/L (ref 1–32)
ATMOSPHERIC PRESS: ABNORMAL MM[HG]
B-HCG UR QL: NEGATIVE
BARBITURATES UR QL SCN: NEGATIVE
BASE EXCESS BLDA CALC-SCNC: -1.2 MMOL/L (ref 0–3)
BASOPHILS # BLD AUTO: 0.03 10*3/MM3 (ref 0–0.2)
BASOPHILS # BLD AUTO: 0.03 10*3/MM3 (ref 0–0.2)
BASOPHILS NFR BLD AUTO: 0.2 % (ref 0–1.5)
BASOPHILS NFR BLD AUTO: 0.3 % (ref 0–1.5)
BDY SITE: ABNORMAL
BENZODIAZ UR QL SCN: NEGATIVE
BILIRUB SERPL-MCNC: 0.4 MG/DL (ref 0–1.2)
BILIRUB SERPL-MCNC: 0.7 MG/DL (ref 0–1.2)
BUN SERPL-MCNC: 7.3 MG/DL (ref 6–20)
BUN SERPL-MCNC: 9.6 MG/DL (ref 6–20)
BUN/CREAT SERPL: 12.3 (ref 7–25)
BUN/CREAT SERPL: 9.5 (ref 7–25)
BUPRENORPHINE SERPL-MCNC: NEGATIVE NG/ML
CA-I SERPL ISE-MCNC: 1.07 MMOL/L (ref 1.15–1.3)
CALCIUM SPEC-SCNC: 8.6 MG/DL (ref 8.6–10.5)
CALCIUM SPEC-SCNC: 9.2 MG/DL (ref 8.6–10.5)
CANNABINOIDS SERPL QL: POSITIVE
CHLORIDE SERPL-SCNC: 104 MMOL/L (ref 98–107)
CHLORIDE SERPL-SCNC: 106 MMOL/L (ref 98–107)
CK SERPL-CCNC: 185 U/L (ref 20–180)
CO2 BLDA-SCNC: 24.7 MMOL/L (ref 22–29)
CO2 SERPL-SCNC: 13.6 MMOL/L (ref 22–29)
CO2 SERPL-SCNC: 17.6 MMOL/L (ref 22–29)
COCAINE UR QL: NEGATIVE
CREAT SERPL-MCNC: 0.77 MG/DL (ref 0.57–1)
CREAT SERPL-MCNC: 0.78 MG/DL (ref 0.57–1)
DEPRECATED RDW RBC AUTO: 41.2 FL (ref 37–54)
DEPRECATED RDW RBC AUTO: 41.4 FL (ref 37–54)
EGFRCR SERPLBLD CKD-EPI 2021: 111.7 ML/MIN/1.73
EGFRCR SERPLBLD CKD-EPI 2021: 113.4 ML/MIN/1.73
EOSINOPHIL # BLD AUTO: 0.02 10*3/MM3 (ref 0–0.4)
EOSINOPHIL # BLD AUTO: 0.03 10*3/MM3 (ref 0–0.4)
EOSINOPHIL NFR BLD AUTO: 0.1 % (ref 0.3–6.2)
EOSINOPHIL NFR BLD AUTO: 0.3 % (ref 0.3–6.2)
ERYTHROCYTE [DISTWIDTH] IN BLOOD BY AUTOMATED COUNT: 13.7 % (ref 12.3–15.4)
ERYTHROCYTE [DISTWIDTH] IN BLOOD BY AUTOMATED COUNT: 13.7 % (ref 12.3–15.4)
ETHANOL UR QL: <0.01 %
GEN 5 1HR TROPONIN T REFLEX: <6 NG/L
GLOBULIN UR ELPH-MCNC: 2.4 GM/DL
GLOBULIN UR ELPH-MCNC: 2.5 GM/DL
GLUCOSE BLDC GLUCOMTR-MCNC: 89 MG/DL (ref 70–105)
GLUCOSE SERPL-MCNC: 115 MG/DL (ref 65–99)
GLUCOSE SERPL-MCNC: 94 MG/DL (ref 65–99)
HCO3 BLDA-SCNC: 23.5 MMOL/L (ref 21–28)
HCT VFR BLD AUTO: 35.4 % (ref 34–46.6)
HCT VFR BLD AUTO: 36.3 % (ref 34–46.6)
HEMODILUTION: NO
HGB BLD-MCNC: 11.5 G/DL (ref 12–15.9)
HGB BLD-MCNC: 11.9 G/DL (ref 12–15.9)
IMM GRANULOCYTES # BLD AUTO: 0.06 10*3/MM3 (ref 0–0.05)
IMM GRANULOCYTES # BLD AUTO: 0.12 10*3/MM3 (ref 0–0.05)
IMM GRANULOCYTES NFR BLD AUTO: 0.5 % (ref 0–0.5)
IMM GRANULOCYTES NFR BLD AUTO: 0.6 % (ref 0–0.5)
INHALED O2 CONCENTRATION: 50 %
LYMPHOCYTES # BLD AUTO: 1.88 10*3/MM3 (ref 0.7–3.1)
LYMPHOCYTES # BLD AUTO: 1.94 10*3/MM3 (ref 0.7–3.1)
LYMPHOCYTES NFR BLD AUTO: 17 % (ref 19.6–45.3)
LYMPHOCYTES NFR BLD AUTO: 9.6 % (ref 19.6–45.3)
MAGNESIUM SERPL-MCNC: 2 MG/DL (ref 1.7–2.2)
MCH RBC QN AUTO: 27 PG (ref 26.6–33)
MCH RBC QN AUTO: 27.2 PG (ref 26.6–33)
MCHC RBC AUTO-ENTMCNC: 32.5 G/DL (ref 31.5–35.7)
MCHC RBC AUTO-ENTMCNC: 32.8 G/DL (ref 31.5–35.7)
MCV RBC AUTO: 83.1 FL (ref 79–97)
MCV RBC AUTO: 83.1 FL (ref 79–97)
METHADONE UR QL SCN: NEGATIVE
MODALITY: ABNORMAL
MONOCYTES # BLD AUTO: 0.81 10*3/MM3 (ref 0.1–0.9)
MONOCYTES # BLD AUTO: 1.28 10*3/MM3 (ref 0.1–0.9)
MONOCYTES NFR BLD AUTO: 6.5 % (ref 5–12)
MONOCYTES NFR BLD AUTO: 7.1 % (ref 5–12)
NEUTROPHILS NFR BLD AUTO: 16.22 10*3/MM3 (ref 1.7–7)
NEUTROPHILS NFR BLD AUTO: 74.8 % (ref 42.7–76)
NEUTROPHILS NFR BLD AUTO: 8.51 10*3/MM3 (ref 1.7–7)
NEUTROPHILS NFR BLD AUTO: 83 % (ref 42.7–76)
NRBC BLD AUTO-RTO: 0 /100 WBC (ref 0–0.2)
NRBC BLD AUTO-RTO: 0 /100 WBC (ref 0–0.2)
OPIATES UR QL: NEGATIVE
OXYCODONE UR QL SCN: NEGATIVE
PCO2 BLDA: 38.7 MM HG (ref 35–48)
PCP UR QL SCN: NEGATIVE
PEEP RESPIRATORY: 5 CM[H2O]
PH BLDA: 7.39 PH UNITS (ref 7.35–7.45)
PHOSPHATE SERPL-MCNC: 3.1 MG/DL (ref 2.5–4.5)
PLATELET # BLD AUTO: 303 10*3/MM3 (ref 140–450)
PLATELET # BLD AUTO: 320 10*3/MM3 (ref 140–450)
PMV BLD AUTO: 10.2 FL (ref 6–12)
PMV BLD AUTO: 10.5 FL (ref 6–12)
PO2 BLD: 165 MM[HG] (ref 0–500)
PO2 BLDA: 82.4 MM HG (ref 83–108)
POTASSIUM SERPL-SCNC: 3.5 MMOL/L (ref 3.5–5.2)
POTASSIUM SERPL-SCNC: 3.6 MMOL/L (ref 3.5–5.2)
POTASSIUM SERPL-SCNC: 4 MMOL/L (ref 3.5–5.2)
PROT SERPL-MCNC: 6.8 G/DL (ref 6–8.5)
PROT SERPL-MCNC: 7.2 G/DL (ref 6–8.5)
QTC INTERVAL: NORMAL MS
RBC # BLD AUTO: 4.26 10*6/MM3 (ref 3.77–5.28)
RBC # BLD AUTO: 4.37 10*6/MM3 (ref 3.77–5.28)
RESPIRATORY RATE: 16
SALICYLATES SERPL-MCNC: <0.5 MG/DL
SAO2 % BLDCOA: 96 % (ref 94–98)
SODIUM SERPL-SCNC: 138 MMOL/L (ref 136–145)
SODIUM SERPL-SCNC: 139 MMOL/L (ref 136–145)
T4 FREE SERPL-MCNC: 1.23 NG/DL (ref 0.92–1.68)
TRICYCLICS UR QL SCN: NEGATIVE
TROPONIN T NUMERIC DELTA: NORMAL
TROPONIN T SERPL HS-MCNC: <6 NG/L
TSH SERPL DL<=0.05 MIU/L-ACNC: 2.88 UIU/ML (ref 0.27–4.2)
VENTILATOR MODE: AC
VT ON VENT VENT: 450 ML
WBC NRBC COR # BLD AUTO: 11.38 10*3/MM3 (ref 3.4–10.8)
WBC NRBC COR # BLD AUTO: 19.55 10*3/MM3 (ref 3.4–10.8)

## 2025-06-30 PROCEDURE — 80050 GENERAL HEALTH PANEL: CPT

## 2025-06-30 PROCEDURE — 25010000002 PROPOFOL 10 MG/ML EMULSION

## 2025-06-30 PROCEDURE — 82077 ASSAY SPEC XCP UR&BREATH IA: CPT

## 2025-06-30 PROCEDURE — 25010000002 LACOSAMIDE 200 MG/20ML SOLUTION: Performed by: PSYCHIATRY & NEUROLOGY

## 2025-06-30 PROCEDURE — 82330 ASSAY OF CALCIUM: CPT | Performed by: HOSPITALIST

## 2025-06-30 PROCEDURE — 25010000002 LORAZEPAM PER 2 MG: Performed by: PSYCHIATRY & NEUROLOGY

## 2025-06-30 PROCEDURE — 83735 ASSAY OF MAGNESIUM: CPT | Performed by: STUDENT IN AN ORGANIZED HEALTH CARE EDUCATION/TRAINING PROGRAM

## 2025-06-30 PROCEDURE — 80179 DRUG ASSAY SALICYLATE: CPT

## 2025-06-30 PROCEDURE — 94002 VENT MGMT INPAT INIT DAY: CPT

## 2025-06-30 PROCEDURE — 95819 EEG AWAKE AND ASLEEP: CPT

## 2025-06-30 PROCEDURE — 82550 ASSAY OF CK (CPK): CPT | Performed by: INTERNAL MEDICINE

## 2025-06-30 PROCEDURE — 71045 X-RAY EXAM CHEST 1 VIEW: CPT

## 2025-06-30 PROCEDURE — 94761 N-INVAS EAR/PLS OXIMETRY MLT: CPT

## 2025-06-30 PROCEDURE — 25010000002 MIDAZOLAM PER 1 MG

## 2025-06-30 PROCEDURE — 95822 EEG COMA OR SLEEP ONLY: CPT | Performed by: PSYCHIATRY & NEUROLOGY

## 2025-06-30 PROCEDURE — C9254 INJECTION, LACOSAMIDE: HCPCS | Performed by: PSYCHIATRY & NEUROLOGY

## 2025-06-30 PROCEDURE — 5A1945Z RESPIRATORY VENTILATION, 24-96 CONSECUTIVE HOURS: ICD-10-PCS | Performed by: INTERNAL MEDICINE

## 2025-06-30 PROCEDURE — 0BH17EZ INSERTION OF ENDOTRACHEAL AIRWAY INTO TRACHEA, VIA NATURAL OR ARTIFICIAL OPENING: ICD-10-PCS | Performed by: INTERNAL MEDICINE

## 2025-06-30 PROCEDURE — 25810000003 SODIUM CHLORIDE 0.9 % SOLUTION: Performed by: STUDENT IN AN ORGANIZED HEALTH CARE EDUCATION/TRAINING PROGRAM

## 2025-06-30 PROCEDURE — 25010000002 POTASSIUM CHLORIDE 10 MEQ/100ML SOLUTION: Performed by: INTERNAL MEDICINE

## 2025-06-30 PROCEDURE — 82803 BLOOD GASES ANY COMBINATION: CPT | Performed by: INTERNAL MEDICINE

## 2025-06-30 PROCEDURE — 85025 COMPLETE CBC W/AUTO DIFF WBC: CPT | Performed by: STUDENT IN AN ORGANIZED HEALTH CARE EDUCATION/TRAINING PROGRAM

## 2025-06-30 PROCEDURE — 25810000003 SODIUM CHLORIDE 0.9 % SOLUTION

## 2025-06-30 PROCEDURE — 82948 REAGENT STRIP/BLOOD GLUCOSE: CPT

## 2025-06-30 PROCEDURE — 99285 EMERGENCY DEPT VISIT HI MDM: CPT

## 2025-06-30 PROCEDURE — 93005 ELECTROCARDIOGRAM TRACING: CPT

## 2025-06-30 PROCEDURE — 84100 ASSAY OF PHOSPHORUS: CPT | Performed by: STUDENT IN AN ORGANIZED HEALTH CARE EDUCATION/TRAINING PROGRAM

## 2025-06-30 PROCEDURE — 25010000002 MIDAZOLAM PER 1 MG: Performed by: NURSE PRACTITIONER

## 2025-06-30 PROCEDURE — 94799 UNLISTED PULMONARY SVC/PX: CPT

## 2025-06-30 PROCEDURE — 25010000002 PROPOFOL 10 MG/ML EMULSION: Performed by: NURSE PRACTITIONER

## 2025-06-30 PROCEDURE — 93005 ELECTROCARDIOGRAM TRACING: CPT | Performed by: STUDENT IN AN ORGANIZED HEALTH CARE EDUCATION/TRAINING PROGRAM

## 2025-06-30 PROCEDURE — 25010000002 LEVETRIRACETAM PER 10 MG

## 2025-06-30 PROCEDURE — 25010000002 LORAZEPAM PER 2 MG: Performed by: HOSPITALIST

## 2025-06-30 PROCEDURE — 31500 INSERT EMERGENCY AIRWAY: CPT | Performed by: INTERNAL MEDICINE

## 2025-06-30 PROCEDURE — 93010 ELECTROCARDIOGRAM REPORT: CPT | Performed by: INTERNAL MEDICINE

## 2025-06-30 PROCEDURE — 25010000002 MIDAZOLAM 1 MG/ML 100ML NS 100 MG/100ML SOLUTION: Performed by: INTERNAL MEDICINE

## 2025-06-30 PROCEDURE — 36415 COLL VENOUS BLD VENIPUNCTURE: CPT

## 2025-06-30 PROCEDURE — 74018 RADEX ABDOMEN 1 VIEW: CPT

## 2025-06-30 PROCEDURE — 25010000002 FENTANYL CITRATE (PF) 50 MCG/ML SOLUTION: Performed by: INTERNAL MEDICINE

## 2025-06-30 PROCEDURE — 25010000002 LORAZEPAM PER 2 MG

## 2025-06-30 PROCEDURE — 81025 URINE PREGNANCY TEST: CPT | Performed by: NURSE PRACTITIONER

## 2025-06-30 PROCEDURE — 80306 DRUG TEST PRSMV INSTRMNT: CPT

## 2025-06-30 PROCEDURE — 84132 ASSAY OF SERUM POTASSIUM: CPT | Performed by: INTERNAL MEDICINE

## 2025-06-30 PROCEDURE — 84439 ASSAY OF FREE THYROXINE: CPT | Performed by: STUDENT IN AN ORGANIZED HEALTH CARE EDUCATION/TRAINING PROGRAM

## 2025-06-30 PROCEDURE — 84484 ASSAY OF TROPONIN QUANT: CPT

## 2025-06-30 PROCEDURE — 99221 1ST HOSP IP/OBS SF/LOW 40: CPT | Performed by: PSYCHIATRY & NEUROLOGY

## 2025-06-30 PROCEDURE — 80143 DRUG ASSAY ACETAMINOPHEN: CPT

## 2025-06-30 PROCEDURE — 25010000002 LEVETRIRACETAM PER 10 MG: Performed by: PSYCHIATRY & NEUROLOGY

## 2025-06-30 PROCEDURE — 93005 ELECTROCARDIOGRAM TRACING: CPT | Performed by: INTERNAL MEDICINE

## 2025-06-30 PROCEDURE — 36600 WITHDRAWAL OF ARTERIAL BLOOD: CPT | Performed by: INTERNAL MEDICINE

## 2025-06-30 PROCEDURE — 80053 COMPREHEN METABOLIC PANEL: CPT | Performed by: STUDENT IN AN ORGANIZED HEALTH CARE EDUCATION/TRAINING PROGRAM

## 2025-06-30 PROCEDURE — 25010000002 CALCIUM GLUCONATE 2-0.675 GM/100ML-% SOLUTION: Performed by: NURSE PRACTITIONER

## 2025-06-30 RX ORDER — ETOMIDATE 2 MG/ML
INJECTION INTRAVENOUS
Status: COMPLETED
Start: 2025-06-30 | End: 2025-06-30

## 2025-06-30 RX ORDER — LACOSAMIDE 10 MG/ML
200 INJECTION, SOLUTION INTRAVENOUS ONCE
Status: COMPLETED | OUTPATIENT
Start: 2025-06-30 | End: 2025-06-30

## 2025-06-30 RX ORDER — LACOSAMIDE 10 MG/ML
50 INJECTION, SOLUTION INTRAVENOUS EVERY 12 HOURS
Status: DISCONTINUED | OUTPATIENT
Start: 2025-06-30 | End: 2025-07-08 | Stop reason: HOSPADM

## 2025-06-30 RX ORDER — NITROGLYCERIN 0.4 MG/1
0.4 TABLET SUBLINGUAL
Status: DISCONTINUED | OUTPATIENT
Start: 2025-06-30 | End: 2025-07-08 | Stop reason: HOSPADM

## 2025-06-30 RX ORDER — LEVETIRACETAM 500 MG/5ML
500 INJECTION, SOLUTION, CONCENTRATE INTRAVENOUS EVERY 12 HOURS SCHEDULED
Status: DISCONTINUED | OUTPATIENT
Start: 2025-06-30 | End: 2025-07-08 | Stop reason: HOSPADM

## 2025-06-30 RX ORDER — SODIUM CHLORIDE 9 MG/ML
125 INJECTION, SOLUTION INTRAVENOUS CONTINUOUS
Status: DISPENSED | OUTPATIENT
Start: 2025-06-30 | End: 2025-06-30

## 2025-06-30 RX ORDER — MIDAZOLAM HYDROCHLORIDE 1 MG/ML
6 INJECTION, SOLUTION INTRAMUSCULAR; INTRAVENOUS ONCE
Status: COMPLETED | OUTPATIENT
Start: 2025-06-30 | End: 2025-06-30

## 2025-06-30 RX ORDER — SODIUM CHLORIDE 0.9 % (FLUSH) 0.9 %
10 SYRINGE (ML) INJECTION EVERY 12 HOURS SCHEDULED
Status: DISCONTINUED | OUTPATIENT
Start: 2025-06-30 | End: 2025-07-08 | Stop reason: HOSPADM

## 2025-06-30 RX ORDER — FENTANYL CITRATE 50 UG/ML
50 INJECTION, SOLUTION INTRAMUSCULAR; INTRAVENOUS
Refills: 0 | Status: DISCONTINUED | OUTPATIENT
Start: 2025-06-30 | End: 2025-06-30

## 2025-06-30 RX ORDER — MIDAZOLAM IN NACL,ISO-OSMOT/PF 50 MG/50ML
1-10 INFUSION BOTTLE (ML) INTRAVENOUS
Status: DISCONTINUED | OUTPATIENT
Start: 2025-06-30 | End: 2025-07-02

## 2025-06-30 RX ORDER — FLUTICASONE PROPIONATE 50 MCG
2 SPRAY, SUSPENSION (ML) NASAL DAILY
COMMUNITY
End: 2025-07-18

## 2025-06-30 RX ORDER — LORAZEPAM 2 MG/ML
2 INJECTION INTRAMUSCULAR ONCE
Status: COMPLETED | OUTPATIENT
Start: 2025-06-30 | End: 2025-06-30

## 2025-06-30 RX ORDER — LORAZEPAM 2 MG/ML
INJECTION INTRAMUSCULAR
Status: COMPLETED
Start: 2025-06-30 | End: 2025-06-30

## 2025-06-30 RX ORDER — POTASSIUM CHLORIDE 7.45 MG/ML
10 INJECTION INTRAVENOUS
Status: DISPENSED | OUTPATIENT
Start: 2025-06-30 | End: 2025-06-30

## 2025-06-30 RX ORDER — SODIUM CHLORIDE 0.9 % (FLUSH) 0.9 %
10 SYRINGE (ML) INJECTION AS NEEDED
Status: DISCONTINUED | OUTPATIENT
Start: 2025-06-30 | End: 2025-07-08 | Stop reason: HOSPADM

## 2025-06-30 RX ORDER — DEXMEDETOMIDINE HYDROCHLORIDE 4 UG/ML
INJECTION, SOLUTION INTRAVENOUS
Status: ACTIVE
Start: 2025-06-30 | End: 2025-07-01

## 2025-06-30 RX ORDER — FENTANYL CITRATE 50 UG/ML
INJECTION, SOLUTION INTRAMUSCULAR; INTRAVENOUS
Status: DISCONTINUED
Start: 2025-06-30 | End: 2025-06-30 | Stop reason: WASHOUT

## 2025-06-30 RX ORDER — CALCIUM GLUCONATE 20 MG/ML
2000 INJECTION, SOLUTION INTRAVENOUS ONCE
Status: COMPLETED | OUTPATIENT
Start: 2025-06-30 | End: 2025-06-30

## 2025-06-30 RX ORDER — ETOMIDATE 2 MG/ML
20 INJECTION INTRAVENOUS ONCE
Status: COMPLETED | OUTPATIENT
Start: 2025-06-30 | End: 2025-06-30

## 2025-06-30 RX ORDER — FENTANYL CITRATE-0.9 % NACL/PF 10 MCG/ML
50-300 PLASTIC BAG, INJECTION (ML) INTRAVENOUS
Status: DISCONTINUED | OUTPATIENT
Start: 2025-06-30 | End: 2025-07-02

## 2025-06-30 RX ORDER — LORAZEPAM 2 MG/ML
INJECTION INTRAMUSCULAR
Status: ACTIVE
Start: 2025-06-30 | End: 2025-06-30

## 2025-06-30 RX ORDER — LORAZEPAM 2 MG/ML
INJECTION INTRAMUSCULAR
Status: COMPLETED | OUTPATIENT
Start: 2025-06-30 | End: 2025-06-30

## 2025-06-30 RX ORDER — ERGOCALCIFEROL 1.25 MG/1
50000 CAPSULE, LIQUID FILLED ORAL WEEKLY
COMMUNITY
End: 2025-07-18 | Stop reason: SDUPTHER

## 2025-06-30 RX ORDER — MIDAZOLAM HYDROCHLORIDE 1 MG/ML
INJECTION, SOLUTION INTRAMUSCULAR; INTRAVENOUS
Status: COMPLETED
Start: 2025-06-30 | End: 2025-06-30

## 2025-06-30 RX ORDER — AMOXICILLIN 250 MG
2 CAPSULE ORAL 2 TIMES DAILY PRN
Status: DISCONTINUED | OUTPATIENT
Start: 2025-06-30 | End: 2025-07-03

## 2025-06-30 RX ORDER — SODIUM CHLORIDE 9 MG/ML
100 INJECTION, SOLUTION INTRAVENOUS CONTINUOUS
Status: DISCONTINUED | OUTPATIENT
Start: 2025-06-30 | End: 2025-06-30

## 2025-06-30 RX ORDER — PROPOFOL 10 MG/ML
VIAL (ML) INTRAVENOUS
Status: COMPLETED
Start: 2025-06-30 | End: 2025-06-30

## 2025-06-30 RX ORDER — DEXMEDETOMIDINE HYDROCHLORIDE 4 UG/ML
.2-1.5 INJECTION, SOLUTION INTRAVENOUS
Status: DISCONTINUED | OUTPATIENT
Start: 2025-06-30 | End: 2025-06-30

## 2025-06-30 RX ORDER — ONDANSETRON 4 MG/1
4 TABLET, ORALLY DISINTEGRATING ORAL EVERY 8 HOURS PRN
COMMUNITY
End: 2025-07-18

## 2025-06-30 RX ORDER — BISACODYL 10 MG
10 SUPPOSITORY, RECTAL RECTAL DAILY PRN
Status: DISCONTINUED | OUTPATIENT
Start: 2025-06-30 | End: 2025-07-03

## 2025-06-30 RX ORDER — LEVETIRACETAM 500 MG/5ML
INJECTION, SOLUTION, CONCENTRATE INTRAVENOUS
Status: COMPLETED
Start: 2025-06-30 | End: 2025-06-30

## 2025-06-30 RX ORDER — LEVETIRACETAM 500 MG/5ML
2000 INJECTION, SOLUTION, CONCENTRATE INTRAVENOUS ONCE
Status: COMPLETED | OUTPATIENT
Start: 2025-06-30 | End: 2025-06-30

## 2025-06-30 RX ORDER — ROCURONIUM BROMIDE 10 MG/ML
50 INJECTION, SOLUTION INTRAVENOUS ONCE
Status: COMPLETED | OUTPATIENT
Start: 2025-06-30 | End: 2025-06-30

## 2025-06-30 RX ORDER — ROCURONIUM BROMIDE 10 MG/ML
INJECTION, SOLUTION INTRAVENOUS
Status: COMPLETED
Start: 2025-06-30 | End: 2025-06-30

## 2025-06-30 RX ORDER — BISACODYL 5 MG/1
5 TABLET, DELAYED RELEASE ORAL DAILY PRN
Status: DISCONTINUED | OUTPATIENT
Start: 2025-06-30 | End: 2025-07-03

## 2025-06-30 RX ORDER — OMEPRAZOLE 40 MG/1
40 CAPSULE, DELAYED RELEASE ORAL EVERY MORNING
COMMUNITY
End: 2025-07-18

## 2025-06-30 RX ORDER — MIDAZOLAM HYDROCHLORIDE 1 MG/ML
4 INJECTION, SOLUTION INTRAMUSCULAR; INTRAVENOUS ONCE
Status: COMPLETED | OUTPATIENT
Start: 2025-06-30 | End: 2025-06-30

## 2025-06-30 RX ORDER — SODIUM CHLORIDE 9 MG/ML
40 INJECTION, SOLUTION INTRAVENOUS AS NEEDED
Status: DISCONTINUED | OUTPATIENT
Start: 2025-06-30 | End: 2025-07-08 | Stop reason: HOSPADM

## 2025-06-30 RX ORDER — POLYETHYLENE GLYCOL 3350 17 G/17G
17 POWDER, FOR SOLUTION ORAL DAILY PRN
Status: DISCONTINUED | OUTPATIENT
Start: 2025-06-30 | End: 2025-07-03

## 2025-06-30 RX ADMIN — FENTANYL CITRATE 50 MCG: 0.05 INJECTION, SOLUTION INTRAMUSCULAR; INTRAVENOUS at 15:01

## 2025-06-30 RX ADMIN — MIDAZOLAM 4 MG: 1 INJECTION INTRAMUSCULAR; INTRAVENOUS at 14:55

## 2025-06-30 RX ADMIN — MIDAZOLAM 6 MG: 1 INJECTION INTRAMUSCULAR; INTRAVENOUS at 12:57

## 2025-06-30 RX ADMIN — ROCURONIUM BROMIDE 50 MG: 10 INJECTION, SOLUTION INTRAVENOUS at 13:07

## 2025-06-30 RX ADMIN — FENTANYL CITRATE 50 MCG: 0.05 INJECTION, SOLUTION INTRAMUSCULAR; INTRAVENOUS at 16:03

## 2025-06-30 RX ADMIN — LORAZEPAM 2 MG: 2 INJECTION, SOLUTION INTRAMUSCULAR; INTRAVENOUS at 08:48

## 2025-06-30 RX ADMIN — MIDAZOLAM IN SODIUM CHLORIDE 8 MG/HR: 1 INJECTION INTRAVENOUS at 22:37

## 2025-06-30 RX ADMIN — PROPOFOL 15 MCG/KG/MIN: 10 INJECTION, EMULSION INTRAVENOUS at 15:10

## 2025-06-30 RX ADMIN — POTASSIUM CHLORIDE 10 MEQ: 7.46 INJECTION, SOLUTION INTRAVENOUS at 16:09

## 2025-06-30 RX ADMIN — LORAZEPAM 2 MG: 2 INJECTION INTRAMUSCULAR at 01:40

## 2025-06-30 RX ADMIN — VALPROATE SODIUM 500 MG: 100 INJECTION, SOLUTION INTRAVENOUS at 15:10

## 2025-06-30 RX ADMIN — LEVETIRACETAM 500 MG: 500 INJECTION, SOLUTION INTRAVENOUS at 21:36

## 2025-06-30 RX ADMIN — Medication 10 ML: at 10:24

## 2025-06-30 RX ADMIN — MIDAZOLAM IN SODIUM CHLORIDE 1 MG/HR: 1 INJECTION INTRAVENOUS at 13:51

## 2025-06-30 RX ADMIN — ETOMIDATE 20 MG: 2 INJECTION INTRAVENOUS at 13:00

## 2025-06-30 RX ADMIN — Medication 10 ML: at 21:37

## 2025-06-30 RX ADMIN — SODIUM CHLORIDE 1000 ML: 9 INJECTION, SOLUTION INTRAVENOUS at 03:50

## 2025-06-30 RX ADMIN — LORAZEPAM 2 MG: 2 INJECTION INTRAMUSCULAR at 01:35

## 2025-06-30 RX ADMIN — CALCIUM GLUCONATE 2000 MG: 20 INJECTION, SOLUTION INTRAVENOUS at 13:07

## 2025-06-30 RX ADMIN — MIDAZOLAM HYDROCHLORIDE 6 MG: 1 INJECTION, SOLUTION INTRAMUSCULAR; INTRAVENOUS at 12:57

## 2025-06-30 RX ADMIN — POTASSIUM CHLORIDE 10 MEQ: 7.46 INJECTION, SOLUTION INTRAVENOUS at 13:08

## 2025-06-30 RX ADMIN — LACOSAMIDE 50 MG: 10 INJECTION INTRAVENOUS at 21:37

## 2025-06-30 RX ADMIN — SODIUM CHLORIDE 125 ML/HR: 9 INJECTION, SOLUTION INTRAVENOUS at 10:24

## 2025-06-30 RX ADMIN — LORAZEPAM 2 MG: 2 INJECTION INTRAMUSCULAR; INTRAVENOUS at 01:35

## 2025-06-30 RX ADMIN — VALPROATE SODIUM 500 MG: 100 INJECTION, SOLUTION INTRAVENOUS at 21:36

## 2025-06-30 RX ADMIN — POTASSIUM CHLORIDE 10 MEQ: 7.46 INJECTION, SOLUTION INTRAVENOUS at 11:50

## 2025-06-30 RX ADMIN — LORAZEPAM 2 MG: 2 INJECTION, SOLUTION INTRAMUSCULAR; INTRAVENOUS at 08:37

## 2025-06-30 RX ADMIN — LACOSAMIDE 200 MG: 10 INJECTION INTRAVENOUS at 10:23

## 2025-06-30 RX ADMIN — SODIUM CHLORIDE 125 ML/HR: 9 INJECTION, SOLUTION INTRAVENOUS at 03:51

## 2025-06-30 RX ADMIN — LEVETIRACETAM 2000 MG: 100 INJECTION INTRAVENOUS at 08:50

## 2025-06-30 RX ADMIN — Medication 50 MCG/HR: at 17:16

## 2025-06-30 RX ADMIN — PROPOFOL 30 MCG/KG/MIN: 10 INJECTION, EMULSION INTRAVENOUS at 21:37

## 2025-06-30 RX ADMIN — LORAZEPAM 2 MG: 2 INJECTION INTRAMUSCULAR; INTRAVENOUS at 01:40

## 2025-06-30 RX ADMIN — MUPIROCIN 1 APPLICATION: 20 OINTMENT TOPICAL at 17:34

## 2025-06-30 RX ADMIN — ETOMIDATE INJECTION 20 MG: 2 SOLUTION INTRAVENOUS at 13:00

## 2025-06-30 RX ADMIN — LEVETIRACETAM 2000 MG: 500 INJECTION, SOLUTION, CONCENTRATE INTRAVENOUS at 08:50

## 2025-06-30 NOTE — PLAN OF CARE
Goal Outcome Evaluation:              Outcome Evaluation: pt having multiple seizures this morning- intubated for protection of airway. versed gtt started, propofol started, fentanyl started. pt had no UOP this shift- bladder scan showed over 600ml, straight cathed and got out over 500ml. K replaced.

## 2025-06-30 NOTE — NURSING NOTE
Patient had several witnessed seizures starting around 5314-9242, rapid response called. ICU charge and neurologist called. Ativan given, as well as keppra. Nystagmus present during assessment, neurologist aware. Patient remained tachycardic throughout episode, heart rate 110-150's. Nurse went to update family, but no family was present in chart- ICU nurse notified of this.
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
## 2025-06-30 NOTE — PROGRESS NOTES
[facility] MEDICINE SERVICE  DAILY PROGRESS NOTE    NAME: Rand Asencio  : 2005  MRN: [id number]      LOS: 0 days     PROVIDER OF SERVICE: Ginny Forte MD    Chief Complaint: Intentional overdose    Subjective:     Interval History:    Pt drowsy and somnolent,       Review of Systems:   Review of Systems  14 point ros is negative other than what is stated positive above       Objective:     Vital Signs  Temp:  [97.6 °F (36.4 °C)-98.4 °F (36.9 °C)] 98.1 °F (36.7 °C)  Heart Rate:  [] 120  Resp:  [17-21] 21  BP: ()/(53-76) 121/71   Body mass index is 26.13 kg/m².    Physical Exam  Physical Exam  General: Disoriented  HEENT: Atraumatic normocephalic  Cardio: Heart rate normal, rhythm regular  Respiratory: Clear to auscultation on exam  Abdomen: Soft, nontender, no rebound or guarding  Extremities: No remarkable edema in the bilateral extremities  Neuro: Disoriented, neurological exam limited given patient disorientation     Diagnostic Data    Results from last 7 days   Lab Units 25  0844   WBC 10*3/mm3 11.38*   HEMOGLOBIN g/dL 11.5*   HEMATOCRIT % 35.4   PLATELETS 10*3/mm3 303   GLUCOSE mg/dL 94   CREATININE mg/dL 0.77   BUN mg/dL 7.3   SODIUM mmol/L 139   POTASSIUM mmol/L 3.5   AST (SGOT) U/L 31   ALT (SGPT) U/L 17   ALK PHOS U/L 49   BILIRUBIN mg/dL 0.7   ANION GAP mmol/L 15.4*       XR Chest 1 View  Result Date: 2025  Impression: No acute cardiopulmonary abnormality. Electronically Signed: Apolinar Valenzuela MD  2025 2:45 AM EDT  Workstation ID: [id number]        I reviewed the patient's new clinical results.    Assessment/Plan:     Active and Resolved Problems  Active Hospital Problems    Diagnosis  POA    **Intentional overdose [T50.902A]  Yes      Resolved Hospital Problems   No resolved problems to display.     Rand Asencio is a 20 y.o. female with a CMH of anxiety disorder, suicidal ideation, who presented to [facility] on 2025 following an  intentional overdose involving multiple medications in a suicide attempt.     Assessments  Encephalopathy: Likely secondary to overdose on multiple medication  Intentional overdose - lamotrigine, fluoxetine, cariprazine (Vraylar)  Suicidal ideation with psychiatric history of anxiety disorder and prior self-harm  Metabolic acidosis (bicarb 13.6)  Leukocytosis, possible secondary to vomiting/nausea  Reported Nausea on arrival  Hx of seizures    Plan:    - continue IV fluids 125 cc an hour.  Monitor on telemetry. Poison Control recommends 8-hour observation; continue inpatient observation due to polypharmacy ingestion and mental status changes.  Place Suicide precautions.  - psychiatry was consulted for further evaluation and management.  - had withdrawal seizures this AM == rapid response called. Neurology was consulted - pt received Ativan and keppra-currently sedated  -continue ivf for acidosis   -white count improving     -THC positive on UDS: Will advise patient on cessation of cannabis use when mental status improves.             VTE Prophylaxis:  No VTE prophylaxis order currently exists.                       Disposition Planning:         Time: 35  minutes     Code Status and Medical Interventions: CPR (Attempt to Resuscitate); Full Support   Ordered at: 06/30/25 0350     Code Status (Patient has no pulse and is not breathing):    CPR (Attempt to Resuscitate)     Medical Interventions (Patient has pulse or is breathing):    Full Support       Signature: Electronically signed by Ginny Forte MD, 06/30/25, 09:40 EDT.  [facility] Hospitalist Team
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
## 2025-06-30 NOTE — PAYOR COMM NOTE
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 <6                 UA          4/21/2025    17:20 4/25/2025    09:31 6/5/2025    13:18   Urinalysis   Squamous Epithelial Cells, UA   0-2    Specific Gravity, UA 1.012  1.025  1.010    Ketones, UA Negative  Negative  Negative    Blood, UA Negative  Negative  Moderate (2+)    Leukocytes, UA Negative  Negative  Trace    Nitrite, UA Negative  Negative  Negative    RBC, UA   0-2    WBC, UA   0-2    Bacteria, UA   None Seen        Lab Results   Component Value Date    TSH 2.880 06/30/2025    LDL 91 04/25/2025    HGBA1C 5.10 04/25/2025    [id number] 663 04/25/2025       IMAGING STUDIES:  XR Chest 1 View  Result Date: 6/30/2025  Impression: No acute cardiopulmonary abnormality. Electronically Signed: Apolinar Valenzuela MD  6/30/2025 2:45 AM EDT  Workstation ID: [id number]      I reviewed the patient's new clinical results.    ________________________________________________________     PROBLEM LIST:    Intentional overdose            ASSESSMENT/PLAN:  Seizures  Intentional overdose  Multiple medication    It does not look like she is in status  We will observe closely      Continue Keppra for now and I will continue Vimpat if needed    Ultimately we should be able to get her off of all these medication like the Keppra and Vimpat if she is seizure-free    Likely seizure secondary to decreased seizure threshold    I will check her EEG also    If status develops then we will consider appropriate treatment  It does not look like she is in rhabdo  But will continue fluid maintenance and other treatment protocols as per primary and ICU    I will be available and call me anytime    Unfortunately seizure precautions state laws will apply because she had witnessed seizures      I discussed the patient's findings and my recommendations with patient, nursing staff, and consulting provider    De Motta,  MD  25  11:38 EDT          Electronically signed by De Motta MD at 25 1150       Day, MARIAELENA Gruber at 25 1004       Attestation signed by Regulo Lieberman DO at 25 1308      I have reviewed this documentation and agree with NP note, with any exceptions noted below.  Pt seen and examined by me personally.  I have personally reviewed all overnight events, vitals, labs, chart notes, and imaging.  The NP and I have collaborated to design the stated plan.     Not all of the pt's episodes are seizures, but does desaturate with what appears to be true sz.  Intubated for airway protection.    CC time:   37 mins.      This does not include any procedure time.                          Critical Care Consult Note   Rand Asencio : 2005 MRN:[id number] LOS:0 ROOM: [facility]     Reason for admission: Intentional overdose     Consulting provider: Dr. Motta, neurology    Reason for consultation: Medical management of problems noted below.    Assessment / Plan     Acute toxic encephalopathy  Polysubstance ingestion  New onset seizure activity, likely attributed to medication ingestion lowering seizure threshold  CT head with no acute pathology.  Tox screen positive for THC  Neurology following  Keppra loading dose and then every 12 at her dosing  Vimpat as per neurology  EEG pending  CT head negative for acute intracranial findings  Supportive care for now    Mood disorder with anxiety and depression  Intentional polysubstance ingestion  History of self-destructive behaviors including cutting  Poison control consulted  Consult behavioral health for behavioral medication management when appropriate, currently encephalopathic    Leukocytosis, likely reactive  Afebrile, no bandemia  Lactate <0.3  Monitor off antibiotics for now    History of asthma  DuoNebs as needed      Code Status (Patient has no pulse and is not breathing): CPR (Attempt to Resuscitate)  Medical Interventions (Patient  has pulse or is breathing): Full Support       Nutrition: Diet: Regular/House; Fluid Consistency: Thin (IDDSI 0) Patient isn't on Tube Feeding     VTE Prophylaxis:  No VTE prophylaxis order currently exists.         History of Present illness     Rand Asencio is a 20 y.o. old female patient with PMH of mood disorder with anxiety and depression, asthma and arthritis who presented to the ED on 6/30/2025 for evaluation after an intentional suicide attempt 30 minutes prior to arrival.  The following information has been obtained primarily from review of documentation and collaboration with other providers as the patient is postictal and encephalopathic at the time of examination in the ED.  The patient reported in the ED that she and her partner had gotten into an argument after which she was very upset and ingested her prescribed medications of Lamictal, fluoxetine, and Vraylar with the intention of suicide.  The patient admitted to the ED provider that she has a history of suicide attempts with cutting in the past.  The patient also reported generalized pain, chest pain, and nausea and vomiting.  Poison control was contacted by ED staff and the recommendation was an 8-hour observation at the hospital.  During the patient's time in the ED she had 2 episodes of seizure-like activity which were aborted by IV Ativan.  The patient was admitted and neurology was consulted.  She had at least 2 more witnessed seizure-like activity episodes therefore Keppra and Vimpat were initiated.  After she had several more witnessed episodes of seizure-like activity it was determined that it would benefit her to transfer to the ICU for further medical management and due to the risk of need of airway protection if she develops status epilepticus.  At the time of arrival to the ICU the patient was awake though with a blunted affect.  She was able to answer some questions of orientation however she has no insight into the current  situation.  Her airway is self protected and she is in no acute distress.    ACP: ACP documentation on file    Patient was seen and examined on 06/30/25 at 10:06 EDT .    Past Medical/Surgical/Social/Family History & Allergies     Past Medical History:   Diagnosis Date    Allergic 2016    Last allergy test was with  at [facility] in [place] in 2024    Allergies     Anxiety 2024    Arthritis 2021    Due to multiple ankle sprains/surgery    Asthma     Depression 2025    Due to suicide attempt    Eating disorder 2019    Due to wrestling    Traumatic closed nondisplaced fracture of distal fibula, right, initial encounter 05/03/2021    Visual impairment 2019    Astigmatism      Past Surgical History:   Procedure Laterality Date    LEG TENDON REPAIR Right 01/24/2022    Procedure: TENDON REPAIR FOOT/TOE -posterior tibial tendon repair, Tarsal Tunnel decompression, biopsy of soft tissue mass;  Surgeon: DAMION Hammer DPM;  Location: [facility] OR;  Service: Podiatry;  Laterality: Right;    LYMPH NODE BIOPSY Right     neck      Social History     Socioeconomic History    Marital status: Single   Tobacco Use    Smoking status: Never     Passive exposure: Never    Smokeless tobacco: Never   Vaping Use    Vaping status: Never Used   Substance and Sexual Activity    Alcohol use: Never    Drug use: Yes     Types: Oxycodone, Marijuana    Sexual activity: Yes     Partners: Male     Birth control/protection: Condom      Family History   Problem Relation Age of Onset    Miscarriages / Stillbirths Mother     No Known Problems Father     Diabetes Maternal Grandmother     Cancer Maternal Grandmother     Hypertension Maternal Grandmother     Heart disease Maternal Grandmother     Vision loss Maternal Grandmother     Heart disease Maternal Grandfather     Heart attack Maternal Grandfather     Hypertension Maternal Grandfather       Allergies   Allergen Reactions    Fluticasone Furoate-Vilanterol Seizure      Flonase, senismist, etc        Home Medications     Prior to Admission medications    Medication Sig Start Date End Date Taking? Authorizing Provider   albuterol (ACCUNEB) 0.63 MG/3ML nebulizer solution Take 3 mL by nebulization Every 6 (Six) Hours As Needed for Wheezing. 11/8/24  Yes Marissa Vogel PA   budesonide (Pulmicort) 0.5 MG/2ML nebulizer solution Take 2 mL by nebulization 2 (Two) Times a Day. Dispense as 1 box of unit doses 11/20/24  Yes Eric Pierre MD   cetirizine (zyrTEC) 10 MG tablet Take 1 tablet by mouth Daily. 4/1/25  Yes Екатерина Alcocer MD   fluticasone (FLONASE) 50 MCG/ACT nasal spray Administer 2 sprays into the nostril(s) as directed by provider Daily.   Yes Екатерина Alcocer MD   fluvoxaMINE (LUVOX) 100 MG tablet Take 1 tablet by mouth Every Night. 4/3/25  Yes Екатерина Alcocer MD   hydrOXYzine pamoate (VISTARIL) 50 MG capsule Take 1 capsule by mouth 4 (Four) Times a Day As Needed. 3/14/25  Yes Екатерина Alcocer MD   ipratropium-albuterol (DUO-NEB) 0.5-2.5 mg/3 ml nebulizer Take 3 mL by nebulization Every 4 (Four) Hours As Needed for Wheezing. 11/20/24  Yes Eric Pierre MD   lamoTRIgine (LaMICtal) 100 MG tablet Take 1 tablet by mouth Every 12 (Twelve) Hours. 4/3/25  Yes Екатерина Alcocer MD   omeprazole (priLOSEC) 40 MG capsule Take 1 capsule by mouth Every Morning.   Yes Екатерина Alcocer MD   ondansetron ODT (ZOFRAN-ODT) 4 MG disintegrating tablet Place 1 tablet on the tongue Every 8 (Eight) Hours As Needed for Nausea or Vomiting.   Yes Екатерина Alcocer MD   Tezspire 210 MG/1.91ML solution auto-injector Inject 1.91 mL under the skin into the appropriate area as directed Every 30 (Thirty) Days.   Yes Екатерина Alcocer MD   tiotropium bromide-olodaterol (Stiolto Respimat) 2.5-2.5 MCG/ACT aerosol solution inhaler Inhale 1 puff Daily.   Yes Carlyn MD Екатерина   vitamin D (ERGOCALCIFEROL) 1.25 MG (60770 UT) capsule capsule Take 1 capsule by mouth  1 (One) Time Per Week. Sundays.   Yes Provider, MD Екатериан   Vraylar 1.5 MG capsule capsule Take 1 capsule by mouth Daily. 4/11/25  Yes Provider, MD Екатерина        Objective / Physical Exam     Vital signs:  Temp: 98.1 °F (36.7 °C)  BP: 107/56  Heart Rate: 111  Resp: 21  SpO2: 99 %  Weight: 75.7 kg (166 lb 14.2 oz)    Admission Weight: Weight: 79.8 kg (176 lb)    Physical Exam  Vitals and nursing note reviewed.   Constitutional:       General: She is not in acute distress.     Appearance: She is ill-appearing.      Comments: Drowsy, slow responses   HENT:      Head: Normocephalic and atraumatic.      Right Ear: External ear normal.      Left Ear: External ear normal.      Nose: Nose normal.      Mouth/Throat:      Mouth: Mucous membranes are moist.      Comments: Dentition in good repair  Eyes:      General: No scleral icterus.     Conjunctiva/sclera: Conjunctivae normal.      Pupils: Pupils are equal, round, and reactive to light.      Comments: Nystagmus   Cardiovascular:      Heart sounds: Normal heart sounds, S1 normal and S2 normal. No murmur heard.     Comments: Sinus tachycardia  Pulmonary:      Effort: Pulmonary effort is normal. No respiratory distress.      Breath sounds: Normal breath sounds. No wheezing or rhonchi.   Abdominal:      General: There is no distension.      Palpations: Abdomen is soft.      Comments: Bowel sounds active   Musculoskeletal:      Cervical back: Neck supple. No rigidity.      Right lower leg: No edema.      Left lower leg: No edema.   Skin:     General: Skin is warm and dry.   Neurological:      Comments: No lateralizing deficits  Drowsy at times but awakens easily  Orientation waxes and wanes, no current situational awareness  Nystagmus  No lateralizing deficits  No withdrawal from deep nail pressure   Psychiatric:      Comments: Flat affect          Labs     Results from last 7 days   Lab Units 06/30/25  0844 06/30/25  0138   WBC 10*3/mm3 11.38* 19.55*   HEMATOCRIT %  35.4 36.3   PLATELETS 10*3/mm3 303 320      Results from last 7 days   Lab Units 06/30/25  0844 06/30/25  0138   SODIUM mmol/L 139 138   POTASSIUM mmol/L 3.5 3.6   CHLORIDE mmol/L 106 104   CO2 mmol/L 17.6* 13.6*   ANION GAP mmol/L 15.4* 20.4*   BUN mg/dL 7.3 9.6   CREATININE mg/dL 0.77 0.78   GLUCOSE mg/dL 94 115*   PHOSPHORUS mg/dL 3.1  --    MAGNESIUM mg/dL 2.0  --    ALT (SGPT) U/L 17 21   AST (SGOT) U/L 31 34*   ALK PHOS U/L 49 54        Imaging     XR Chest 1 View  Result Date: 6/30/2025  Impression: No acute cardiopulmonary abnormality. Electronically Signed: Apolniar Valenzuela MD  6/30/2025 2:45 AM EDT  Workstation ID: [id number]       Chest X ray: My independent assessment showed no infiltrates or effusions    EKG: My independent evaluation showed sinus tachycardia, no ST -T changes    Current Medications     Scheduled Meds:  Lacosamide, 200 mg, Intravenous, Once  levETIRAcetam, 2,000 mg, Intravenous, Once  levETIRAcetam, 500 mg, Intravenous, Q12H  LORazepam, , ,   sodium chloride, 10 mL, Intravenous, Q12H         Continuous Infusions:  sodium chloride, 125 mL/hr, Last Rate: 125 mL/hr (06/30/25 0351)  sodium chloride, 100 mL/hr         MARIAELENA Tesfaye   Critical Care  06/30/25   10:06 EDT     Electronically signed by Regulo Lieberman DO at 06/30/25 1921

## 2025-06-30 NOTE — PROCEDURES
Intubation    Date/Time: 6/30/2025 1:05 PM    Performed by: Regluo Lieberman DO  Authorized by: Regulo Lieberman DO  Consent: The procedure was performed in an emergent situation  Patient identity confirmed: arm band  Indications: airway protection and hypoxemia  Intubation method: direct  Patient status: awake  Preoxygenation: BVM  Pretreatment medications: midazolam  Sedatives: etomidate  Paralytic: rocuronium  Laryngoscope size: Mac 4  Tube size: 8.0 mm  Tube type: cuffed  Number of attempts: 1  Cricoid pressure: no  Cords visualized: yes  Post-procedure assessment: chest rise and CO2 detector  Breath sounds: reduced on left  ETT to lip: 24 cm  Tube secured with: ETT camilo  Chest x-ray interpreted by: CXR pending.  Patient tolerance: patient tolerated the procedure well with no immediate complications

## 2025-06-30 NOTE — ED PROVIDER NOTES
Subjective   History of Present Illness  Patient is a 20-year-old female with PMH of depression, anxiety presenting to the ED via EMS for suicidal attempt 30 minutes prior to arrival.  Patient states she and her partner got into argument and she got upset, finished bottles of lamotrigine, fluoxetine, and Vraylar in an attempt to kill herself.  Patient reports a history of suicide attempt with cutting in the past.  Patient reports pain all throughout her body as well as chest pain nausea and vomiting.  Charge nurse spoke with poison control who recommended 8-hour observation.        Review of Systems   Respiratory:  Positive for shortness of breath.    Cardiovascular:  Positive for chest pain.   Musculoskeletal:  Positive for myalgias.   Psychiatric/Behavioral:  Positive for self-injury and suicidal ideas.        Past Medical History:   Diagnosis Date    Allergic 2016    Last allergy test was with  at [facility] in [place] in 2024    Allergies     Anxiety 2024    Arthritis 2021    Due to multiple ankle sprains/surgery    Asthma     Depression 2025    Due to suicide attempt    Eating disorder 2019    Due to wrestling    Traumatic closed nondisplaced fracture of distal fibula, right, initial encounter 05/03/2021    Visual impairment 2019    Astigmatism       Allergies   Allergen Reactions    Fluticasone Furoate-Vilanterol Seizure     Flonase, senismist, etc       Past Surgical History:   Procedure Laterality Date    LEG TENDON REPAIR Right 01/24/2022    Procedure: TENDON REPAIR FOOT/TOE -posterior tibial tendon repair, Tarsal Tunnel decompression, biopsy of soft tissue mass;  Surgeon: DAMION Hammer DPM;  Location: [facility] MAIN OR;  Service: Podiatry;  Laterality: Right;    LYMPH NODE BIOPSY Right     neck       Family History   Problem Relation Age of Onset    Miscarriages / Stillbirths Mother     No Known Problems Father     Diabetes Maternal Grandmother     Cancer Maternal Grandmother      Hypertension Maternal Grandmother     Heart disease Maternal Grandmother     Vision loss Maternal Grandmother     Heart disease Maternal Grandfather     Heart attack Maternal Grandfather     Hypertension Maternal Grandfather        Social History     Socioeconomic History    Marital status: Single   Tobacco Use    Smoking status: Never     Passive exposure: Never    Smokeless tobacco: Never   Vaping Use    Vaping status: Never Used   Substance and Sexual Activity    Alcohol use: Never    Drug use: Yes     Types: Oxycodone    Sexual activity: Yes     Partners: Male     Birth control/protection: Condom           Objective   Physical Exam  Constitutional:       Appearance: Normal appearance.   HENT:      Head: Normocephalic and atraumatic.      Mouth/Throat:      Mouth: Mucous membranes are moist.   Eyes:      Extraocular Movements: Extraocular movements intact.      Pupils: Pupils are equal, round, and reactive to light.   Cardiovascular:      Rate and Rhythm: Regular rhythm. Tachycardia present.      Pulses: Normal pulses.      Heart sounds: Normal heart sounds.   Pulmonary:      Effort: Pulmonary effort is normal.      Breath sounds: Normal breath sounds.   Abdominal:      General: Abdomen is flat. Bowel sounds are normal.      Palpations: Abdomen is soft.      Tenderness: There is no abdominal tenderness.   Musculoskeletal:         General: Normal range of motion.      Cervical back: Normal range of motion.   Skin:     General: Skin is warm and dry.      Capillary Refill: Capillary refill takes less than 2 seconds.   Neurological:      General: No focal deficit present.      Mental Status: She is alert and oriented to person, place, and time.   Psychiatric:         Mood and Affect: Mood is anxious. Affect is tearful.         Speech: Speech normal.         Behavior: Behavior is hyperactive.         Procedures           ED Course  ED Course as of 06/30/25 0355   Mon Jun 30, 2025   0895 Spoke with Dr. Yang  "hospitalist who agreed to admit patient. [EC]      ED Course User Index  [EC] Lilly Torres PA-C      /59   Pulse 86   Temp 98.4 °F (36.9 °C) (Oral)   Resp 18   Ht 170.2 cm (67\")   Wt 79.8 kg (176 lb)   LMP 06/03/2025 (Exact Date)   SpO2 98%   BMI 27.57 kg/m²   Labs Reviewed   COMPREHENSIVE METABOLIC PANEL - Abnormal; Notable for the following components:       Result Value    Glucose 115 (*)     CO2 13.6 (*)     AST (SGOT) 34 (*)     Anion Gap 20.4 (*)     All other components within normal limits    Narrative:     GFR Categories in Chronic Kidney Disease (CKD)              GFR Category          GFR (mL/min/1.73)    Interpretation  G1                    90 or greater        Normal or high (1)  G2                    60-89                Mild decrease (1)  G3a                   45-59                Mild to moderate decrease  G3b                   30-44                Moderate to severe decrease  G4                    15-29                Severe decrease  G5                    14 or less           Kidney failure    (1)In the absence of evidence of kidney disease, neither GFR category G1 or G2 fulfill the criteria for CKD.    eGFR calculation 2021 CKD-EPI creatinine equation, which does not include race as a factor   URINE DRUG SCREEN - Abnormal; Notable for the following components:    THC, Screen, Urine Positive (*)     All other components within normal limits    Narrative:     Cutoff For Drugs Screened:    Amphetamines               500 ng/ml  Barbiturates               200 ng/ml  Benzodiazepines            150 ng/ml  Cocaine                    150 ng/ml  Methadone                  200 ng/ml  Opiates                    100 ng/ml  Phencyclidine               25 ng/ml  THC                         50 ng/ml  Methamphetamine            500 ng/ml  Tricyclic Antidepressants  300 ng/ml  Oxycodone                  100 ng/ml  Buprenorphine               10 ng/ml    The normal value for all drugs tested is " negative. This report includes unconfirmed screening results, with the cutoff values listed, to be used for medical treatment purposes only.  Unconfirmed results must not be used for non-medical purposes such as employment or legal testing.  Clinical consideration should be applied to any drug of abuse test, particularly when unconfirmed results are used.    All urine drugs of abuse requests without chain of custody are for medical screening purposes only.  False positives are possible.     CBC WITH AUTO DIFFERENTIAL - Abnormal; Notable for the following components:    WBC 19.55 (*)     Hemoglobin 11.9 (*)     Neutrophil % 83.0 (*)     Lymphocyte % 9.6 (*)     Eosinophil % 0.1 (*)     Immature Grans % 0.6 (*)     Neutrophils, Absolute 16.22 (*)     Monocytes, Absolute 1.28 (*)     Immature Grans, Absolute 0.12 (*)     All other components within normal limits   TROPONIN - Normal    Narrative:     High Sensitive Troponin T Reference Range:  <14.0 ng/L- Negative Female for AMI  <22.0 ng/L- Negative Male for AMI  >=14 - Abnormal Female indicating possible myocardial injury.  >=22 - Abnormal Male indicating possible myocardial injury.   Clinicians would have to utilize clinical acumen, EKG, Troponin, and serial changes to determine if it is an Acute Myocardial Infarction or myocardial injury due to an underlying chronic condition.        SALICYLATE LEVEL - Normal   ACETAMINOPHEN LEVEL - Normal    Narrative:     Acetaminophen Therapeutic Range  5-20 ug/mL      Hours after ingestion            Toxic Value    4 Hours                           150 ug/mL    8 Hours                            70 ug/mL   12 Hours                            40 ug/mL   16 Hours                            20 ug/mL    These values apply to a single ingestion only.    ETHANOL    Narrative:     Not for legal purposes.   HIGH SENSITIVITIY TROPONIN T 1HR    Narrative:     High Sensitive Troponin T Reference Range:  <14.0 ng/L- Negative Female for  AMI  <22.0 ng/L- Negative Male for AMI  >=14 - Abnormal Female indicating possible myocardial injury.  >=22 - Abnormal Male indicating possible myocardial injury.   Clinicians would have to utilize clinical acumen, EKG, Troponin, and serial changes to determine if it is an Acute Myocardial Infarction or myocardial injury due to an underlying chronic condition.        CBC AND DIFFERENTIAL    Narrative:     The following orders were created for panel order CBC & Differential.  Procedure                               Abnormality         Status                     ---------                               -----------         ------                     CBC Auto Differential[[id number]]        Abnormal            Final result                 Please view results for these tests on the individual orders.     Medications   sodium chloride 0.9 % bolus 1,000 mL (1,000 mL Intravenous New Bag 6/30/25 0350)   sodium chloride 0.9 % infusion (125 mL/hr Intravenous New Bag 6/30/25 0351)   sodium chloride 0.9 % flush 10 mL (has no administration in time range)   sodium chloride 0.9 % flush 10 mL (has no administration in time range)   sodium chloride 0.9 % infusion 40 mL (has no administration in time range)   nitroglycerin (NITROSTAT) SL tablet 0.4 mg (has no administration in time range)   Potassium Replacement - Follow Nurse / BPA Driven Protocol (has no administration in time range)   Magnesium Standard Dose Replacement - Follow Nurse / BPA Driven Protocol (has no administration in time range)   Phosphorus Replacement - Follow Nurse / BPA Driven Protocol (has no administration in time range)   Calcium Replacement - Follow Nurse / BPA Driven Protocol (has no administration in time range)   sennosides-docusate (PERICOLACE) 8.6-50 MG per tablet 2 tablet (has no administration in time range)     And   polyethylene glycol (MIRALAX) packet 17 g (has no administration in time range)     And   bisacodyl (DULCOLAX) EC tablet 5 mg (has no  administration in time range)     And   bisacodyl (DULCOLAX) suppository 10 mg (has no administration in time range)   LORazepam (ATIVAN) injection 2 mg (2 mg Intravenous Given 6/30/25 0135)   LORazepam (ATIVAN) injection 2 mg (2 mg Intravenous Given 6/30/25 0140)     XR Chest 1 View  Result Date: 6/30/2025  Impression: No acute cardiopulmonary abnormality. Electronically Signed: Apolinar Valenzuela MD  6/30/2025 2:45 AM EDT  Workstation ID: [id number]                                                     Medical Decision Making  Chart review: 6/17/25 Aida FERRELL [facility] Medicine: Patient is a 20 y.o. female presented with altered mental status as noted HPI above. CK, CMP, TSH, hCG, lactate, CBC, acetaminophen, salicylate within normal range.  Urine drug screen positive for THC.  Chest x-ray negative and CT head showed no acute intracranial normality.  Recent MRI was negative.  Patient was admitted to the observation unit for IV fluids and neurologist consult.  Neurologist recommended outpatient follow-up with neuro.  Psych was also consulted and recommended discharge home on current meds which are hydroxyzine, Vraylar, Lamictal and Luvox. At this time, patient felt to be in good condition for discharge with close follow up with PCP. Instructed to take all medications as prescribed and to return to ED if any concerning signs/symptoms. All test/lab results were discussed with patient. All questions were answered and patient verbalizes understanding.   Plan:   1. Hallucinations, visual (Primary)  Assessment & Plan:  Patient is poor historian.  Boyfriend and primary care giver is present and states that patient has reported seeing spiders for past 2 days.  Psych MD increased Lamictal to 100mg BID, last week, after hospitalization.  Patient denies SI/HI.  Boyfriend states that patient is never left alone and someone is always with her.  2. Altered mental status, unspecified altered mental status type  Assessment &  Plan:  Very blunt affect.  Saw Psych MD, last week and increased lamictal.  Patient is scheduled to see  Therapist later this week or next.  Contact information for Dr Seipel provided to patient and boyfriend.  Referral to Dr Seipel placed during recent hospitalization.  Highly encouraged patient to reach out to Dr Seipel office to schedule appointment.  Boyfriend and patient verbalized understanding.    Patient presented to the ED for the above complaint.    Patient underwent the above exam and evaluation.    While in the ED patient was placed in gown and IV was established and suicide precautions were began.  Charge nurse spoke with poison control who recommended 8-hour observation and gave labs to order.  EKG, chest x-ray, blood work was obtained to assess for arrhythmia, MI, electro abnormality, dehydration, infection, intoxication, overdose.  Patient had 2 episodes of seizure-like activity, was given Ativan IV and was started on fluids.  Upon reevaluation patient resting comfortably, vital stable on room air.  Educated family at bedside that we will be admitting her for further observation and evaluation.  Patient's family voiced understanding, agreeable with dispo plan.  Spoke with Dr. Yang hospitalist who agreed to admit patient.    EKG independently interpreted by Dr. Castillo showing sinus tachycardia, nonspecific T abnormalities, rate 139, ME interval 126, compared to previous EKG 6/5/2025 showing sinus rhythm, rate 92.  Labs were independently interpreted by myself and deemed remarkable for the following: Leukocytosis 19.55 without bandemia, hemoglobin stable 11.9, CO2 of 13.6 with an anion gap of 20.4, no electrolyte abnormalities, initial troponin negative, repeat troponin negative, ethanol negative, salicylate levels negative, acetaminophen negative, urine drug screen positive for THC.  Chest x-ray independently interpreted by radiologist and reviewed by myself showing: No cardiomegaly, pleural  effusions, pneumothorax, or consolidations.    Appropriate PPE was worn during each patient encounter.    Note Disclaimer: At [facility], we believe that sharing information builds trust and better relationships. You are receiving this note because you are receiving care at [facility] or recently visited. It is possible you will see health information before a provider has talked with you about it. This kind of information can be easy to misunderstand. To help you fully understand what it means for your health, we urge you to discuss this note with your provider.    Based on clinical findings anticipate this patient will require a 2 midnight stay.    Discussed this patient with Dr. Castillo who agrees with plan.      Problems Addressed:  Depression, unspecified depression type: complicated acute illness or injury  Leukocytosis, unspecified type: complicated acute illness or injury  Suicide attempt by drug ingestion, initial encounter: complicated acute illness or injury    Amount and/or Complexity of Data Reviewed  Labs: ordered.  Radiology: ordered.    Risk  Decision regarding hospitalization.        Final diagnoses:   Suicide attempt by drug ingestion, initial encounter   Leukocytosis, unspecified type   Depression, unspecified depression type       ED Disposition  ED Disposition       ED Disposition   Decision to Admit    Condition   --    Comment   Level of Care: Med/Surg [1]   Diagnosis: Intentional overdose [1507052]   Admitting Physician: GEORGE BURCH [[id number]]   Certification: I Certify That Inpatient Hospital Services Are Medically Necessary For Greater Than 2 Midnights                 No follow-up provider specified.       Medication List      No changes were made to your prescriptions during this visit.            Lilly Torres PA-C  06/30/25 0355

## 2025-06-30 NOTE — CODE DOCUMENTATION
Rapid response called related to multiple seizures. Upon arrival of rapid response team, patient had witnessed seizure. It was reported that patient had total of 3 seizures prior to arrival of rapid response team. Postictal phase patient is minimally responsive and has nystagmus. Patient verbalizes that she is scared, does not know where she is, and does not want to die.     Dr. Forte called and received orders for 2mg ativan. Ativan given after another seizure occurred.     Second dose of ativan given upon 3rd seizure occurring. Dr. Motta called to update on patient condition. 2g keppra given IV. Transfer to ICU for closer monitoring. Arelis Day, APRN called and aware of transfer. Upon arrival to ICU 3 more seizures witnessed. Patient unresponsive to painful stimuli during seizure episode.

## 2025-06-30 NOTE — NURSING NOTE
Pt had multiple seizures starting around 1242 and lasting until 1300. Pt HR elevated and O2 dropping into upper 70s. MD order for intubation for airway protection. Versed gtt ordered. See MAR for medications given for intubation. Neuro aware.

## 2025-06-30 NOTE — PROCEDURES
This is an inpatient,   Digitally recorded multi-montage EEG with leads placed according to the international 10/20 system  Photic stimulation was not done  Hyperventilation was not done    This EEG demonstrates faster frequencies and diffuse spindles, even anteriorly displaced spindles    Mostly asleep with spindles  No asymmetry    Nothing suggesting clear-cut epileptiform activity or asymmetry or particular field  Some shaking but no EEG correlate      Impression:    This is an abnormal adult mostly asleep EEG, which showed diffuse spindles which can be seen with medication effect or severe encephalopathy,  Nonspecific  No seizures or interictal discharges, but EEG like this does not rule out epilepsy

## 2025-07-01 LAB
ALBUMIN SERPL-MCNC: 3.8 G/DL (ref 3.5–5.2)
ALBUMIN/GLOB SERPL: 1.9 G/DL
ALP SERPL-CCNC: 46 U/L (ref 39–117)
ALT SERPL W P-5'-P-CCNC: 10 U/L (ref 1–33)
ANION GAP SERPL CALCULATED.3IONS-SCNC: 11.2 MMOL/L (ref 5–15)
ARTERIAL PATENCY WRIST A: POSITIVE
AST SERPL-CCNC: 23 U/L (ref 1–32)
ATMOSPHERIC PRESS: ABNORMAL MM[HG]
BASE EXCESS BLDA CALC-SCNC: 0.5 MMOL/L (ref 0–3)
BASOPHILS # BLD AUTO: 0.02 10*3/MM3 (ref 0–0.2)
BASOPHILS NFR BLD AUTO: 0.3 % (ref 0–1.5)
BDY SITE: ABNORMAL
BILIRUB SERPL-MCNC: 0.7 MG/DL (ref 0–1.2)
BUN SERPL-MCNC: 5.6 MG/DL (ref 6–20)
BUN/CREAT SERPL: 7.5 (ref 7–25)
CA-I SERPL ISE-MCNC: 1.12 MMOL/L (ref 1.15–1.3)
CALCIUM SPEC-SCNC: 8.3 MG/DL (ref 8.6–10.5)
CHLORIDE SERPL-SCNC: 106 MMOL/L (ref 98–107)
CK SERPL-CCNC: 102 U/L (ref 20–180)
CO2 SERPL-SCNC: 20.8 MMOL/L (ref 22–29)
CREAT SERPL-MCNC: 0.75 MG/DL (ref 0.57–1)
D-LACTATE SERPL-SCNC: 1 MMOL/L (ref 0.5–2)
DEPRECATED RDW RBC AUTO: 43 FL (ref 37–54)
EGFRCR SERPLBLD CKD-EPI 2021: 117.1 ML/MIN/1.73
EOSINOPHIL # BLD AUTO: 0.05 10*3/MM3 (ref 0–0.4)
EOSINOPHIL NFR BLD AUTO: 0.7 % (ref 0.3–6.2)
ERYTHROCYTE [DISTWIDTH] IN BLOOD BY AUTOMATED COUNT: 13.8 % (ref 12.3–15.4)
GLOBULIN UR ELPH-MCNC: 2 GM/DL
GLUCOSE BLDC GLUCOMTR-MCNC: 81 MG/DL (ref 74–100)
GLUCOSE SERPL-MCNC: 82 MG/DL (ref 65–99)
HCO3 BLDA-SCNC: 24.8 MMOL/L (ref 21–28)
HCT VFR BLD AUTO: 31.9 % (ref 34–46.6)
HEMODILUTION: NO
HGB BLD-MCNC: 10.2 G/DL (ref 12–15.9)
IMM GRANULOCYTES # BLD AUTO: 0.02 10*3/MM3 (ref 0–0.05)
IMM GRANULOCYTES NFR BLD AUTO: 0.3 % (ref 0–0.5)
INHALED O2 CONCENTRATION: 40 %
LYMPHOCYTES # BLD AUTO: 1.88 10*3/MM3 (ref 0.7–3.1)
LYMPHOCYTES NFR BLD AUTO: 24.6 % (ref 19.6–45.3)
MAGNESIUM SERPL-MCNC: 2.1 MG/DL (ref 1.7–2.2)
MCH RBC QN AUTO: 26.8 PG (ref 26.6–33)
MCHC RBC AUTO-ENTMCNC: 32 G/DL (ref 31.5–35.7)
MCV RBC AUTO: 83.9 FL (ref 79–97)
MODALITY: ABNORMAL
MONOCYTES # BLD AUTO: 0.76 10*3/MM3 (ref 0.1–0.9)
MONOCYTES NFR BLD AUTO: 10 % (ref 5–12)
NEUTROPHILS NFR BLD AUTO: 4.9 10*3/MM3 (ref 1.7–7)
NEUTROPHILS NFR BLD AUTO: 64.1 % (ref 42.7–76)
NRBC BLD AUTO-RTO: 0 /100 WBC (ref 0–0.2)
PCO2 BLDA: 37.4 MM HG (ref 35–48)
PEEP RESPIRATORY: 5 CM[H2O]
PH BLDA: 7.43 PH UNITS (ref 7.35–7.45)
PHOSPHATE SERPL-MCNC: 2.9 MG/DL (ref 2.5–4.5)
PLATELET # BLD AUTO: 250 10*3/MM3 (ref 140–450)
PMV BLD AUTO: 10.3 FL (ref 6–12)
PO2 BLD: 517 MM[HG] (ref 0–500)
PO2 BLDA: 206.8 MM HG (ref 83–108)
POTASSIUM SERPL-SCNC: 3.3 MMOL/L (ref 3.5–5.2)
POTASSIUM SERPL-SCNC: 4.2 MMOL/L (ref 3.5–5.2)
PROT SERPL-MCNC: 5.8 G/DL (ref 6–8.5)
QT INTERVAL: 341 MS
QTC INTERVAL: 434 MS
RBC # BLD AUTO: 3.8 10*6/MM3 (ref 3.77–5.28)
RESPIRATORY RATE: 16
SAO2 % BLDCOA: 99.8 % (ref 94–98)
SODIUM SERPL-SCNC: 138 MMOL/L (ref 136–145)
VENTILATOR MODE: AC
VT ON VENT VENT: 450 ML
WBC NRBC COR # BLD AUTO: 7.63 10*3/MM3 (ref 3.4–10.8)

## 2025-07-01 PROCEDURE — 82550 ASSAY OF CK (CPK): CPT | Performed by: NURSE PRACTITIONER

## 2025-07-01 PROCEDURE — 84100 ASSAY OF PHOSPHORUS: CPT

## 2025-07-01 PROCEDURE — 94003 VENT MGMT INPAT SUBQ DAY: CPT

## 2025-07-01 PROCEDURE — 03HB33Z INSERTION OF INFUSION DEVICE INTO RIGHT RADIAL ARTERY, PERCUTANEOUS APPROACH: ICD-10-PCS | Performed by: STUDENT IN AN ORGANIZED HEALTH CARE EDUCATION/TRAINING PROGRAM

## 2025-07-01 PROCEDURE — 36600 WITHDRAWAL OF ARTERIAL BLOOD: CPT

## 2025-07-01 PROCEDURE — C9254 INJECTION, LACOSAMIDE: HCPCS | Performed by: PSYCHIATRY & NEUROLOGY

## 2025-07-01 PROCEDURE — 82948 REAGENT STRIP/BLOOD GLUCOSE: CPT

## 2025-07-01 PROCEDURE — C1751 CATH, INF, PER/CENT/MIDLINE: HCPCS

## 2025-07-01 PROCEDURE — 83605 ASSAY OF LACTIC ACID: CPT | Performed by: INTERNAL MEDICINE

## 2025-07-01 PROCEDURE — 25010000002 LACOSAMIDE 200 MG/20ML SOLUTION: Performed by: PSYCHIATRY & NEUROLOGY

## 2025-07-01 PROCEDURE — 25010000002 PROPOFOL 10 MG/ML EMULSION: Performed by: NURSE PRACTITIONER

## 2025-07-01 PROCEDURE — 05HY33Z INSERTION OF INFUSION DEVICE INTO UPPER VEIN, PERCUTANEOUS APPROACH: ICD-10-PCS | Performed by: STUDENT IN AN ORGANIZED HEALTH CARE EDUCATION/TRAINING PROGRAM

## 2025-07-01 PROCEDURE — 94799 UNLISTED PULMONARY SVC/PX: CPT

## 2025-07-01 PROCEDURE — 82803 BLOOD GASES ANY COMBINATION: CPT

## 2025-07-01 PROCEDURE — 82330 ASSAY OF CALCIUM: CPT | Performed by: INTERNAL MEDICINE

## 2025-07-01 PROCEDURE — 85025 COMPLETE CBC W/AUTO DIFF WBC: CPT

## 2025-07-01 PROCEDURE — 25010000002 ENOXAPARIN PER 10 MG: Performed by: NURSE PRACTITIONER

## 2025-07-01 PROCEDURE — 84132 ASSAY OF SERUM POTASSIUM: CPT | Performed by: INTERNAL MEDICINE

## 2025-07-01 PROCEDURE — 25010000002 MIDAZOLAM 1 MG/ML 100ML NS 100 MG/100ML SOLUTION: Performed by: INTERNAL MEDICINE

## 2025-07-01 PROCEDURE — 25010000002 LEVETRIRACETAM PER 10 MG: Performed by: PSYCHIATRY & NEUROLOGY

## 2025-07-01 PROCEDURE — 94761 N-INVAS EAR/PLS OXIMETRY MLT: CPT

## 2025-07-01 PROCEDURE — 80053 COMPREHEN METABOLIC PANEL: CPT

## 2025-07-01 PROCEDURE — 83735 ASSAY OF MAGNESIUM: CPT

## 2025-07-01 RX ORDER — ENOXAPARIN SODIUM 100 MG/ML
40 INJECTION SUBCUTANEOUS
Status: DISCONTINUED | OUTPATIENT
Start: 2025-07-01 | End: 2025-07-08 | Stop reason: HOSPADM

## 2025-07-01 RX ORDER — SODIUM CHLORIDE 0.9 % (FLUSH) 0.9 %
10 SYRINGE (ML) INJECTION EVERY 12 HOURS SCHEDULED
Status: CANCELLED | OUTPATIENT
Start: 2025-07-01

## 2025-07-01 RX ORDER — SODIUM CHLORIDE 0.9 % (FLUSH) 0.9 %
10 SYRINGE (ML) INJECTION AS NEEDED
Status: CANCELLED | OUTPATIENT
Start: 2025-07-01

## 2025-07-01 RX ORDER — POTASSIUM CHLORIDE 1.5 G/1.58G
40 POWDER, FOR SOLUTION ORAL EVERY 4 HOURS
Status: COMPLETED | OUTPATIENT
Start: 2025-07-01 | End: 2025-07-01

## 2025-07-01 RX ORDER — LIDOCAINE HYDROCHLORIDE 10 MG/ML
5 INJECTION, SOLUTION INFILTRATION; PERINEURAL ONCE
Status: DISCONTINUED | OUTPATIENT
Start: 2025-07-01 | End: 2025-07-02

## 2025-07-01 RX ORDER — SODIUM CHLORIDE 0.9 % (FLUSH) 0.9 %
20 SYRINGE (ML) INJECTION AS NEEDED
Status: CANCELLED | OUTPATIENT
Start: 2025-07-01

## 2025-07-01 RX ORDER — LANSOPRAZOLE 30 MG/1
30 TABLET, ORALLY DISINTEGRATING, DELAYED RELEASE ORAL
Status: DISCONTINUED | OUTPATIENT
Start: 2025-07-02 | End: 2025-07-03

## 2025-07-01 RX ORDER — SODIUM CHLORIDE 9 MG/ML
40 INJECTION, SOLUTION INTRAVENOUS AS NEEDED
Status: CANCELLED | OUTPATIENT
Start: 2025-07-01

## 2025-07-01 RX ADMIN — LEVETIRACETAM 500 MG: 500 INJECTION, SOLUTION INTRAVENOUS at 08:39

## 2025-07-01 RX ADMIN — POTASSIUM CHLORIDE 40 MEQ: 1.5 POWDER, FOR SOLUTION ORAL at 05:06

## 2025-07-01 RX ADMIN — MUPIROCIN 1 APPLICATION: 20 OINTMENT TOPICAL at 08:09

## 2025-07-01 RX ADMIN — LACOSAMIDE 50 MG: 10 INJECTION INTRAVENOUS at 08:39

## 2025-07-01 RX ADMIN — LACOSAMIDE 50 MG: 10 INJECTION INTRAVENOUS at 21:07

## 2025-07-01 RX ADMIN — PROPOFOL 25 MCG/KG/MIN: 10 INJECTION, EMULSION INTRAVENOUS at 05:05

## 2025-07-01 RX ADMIN — PROPOFOL 25 MCG/KG/MIN: 10 INJECTION, EMULSION INTRAVENOUS at 12:26

## 2025-07-01 RX ADMIN — Medication 10 ML: at 21:08

## 2025-07-01 RX ADMIN — Medication 10 ML: at 08:09

## 2025-07-01 RX ADMIN — PROPOFOL 25 MCG/KG/MIN: 10 INJECTION, EMULSION INTRAVENOUS at 17:25

## 2025-07-01 RX ADMIN — MIDAZOLAM IN SODIUM CHLORIDE 4 MG/HR: 1 INJECTION INTRAVENOUS at 17:18

## 2025-07-01 RX ADMIN — Medication 75 MCG/HR: at 17:22

## 2025-07-01 RX ADMIN — POTASSIUM CHLORIDE 40 MEQ: 1.5 POWDER, FOR SOLUTION ORAL at 08:40

## 2025-07-01 RX ADMIN — MUPIROCIN 1 APPLICATION: 20 OINTMENT TOPICAL at 21:07

## 2025-07-01 RX ADMIN — ENOXAPARIN SODIUM 40 MG: 100 INJECTION SUBCUTANEOUS at 17:18

## 2025-07-01 RX ADMIN — LEVETIRACETAM 500 MG: 500 INJECTION, SOLUTION INTRAVENOUS at 21:07

## 2025-07-01 RX ADMIN — VALPROATE SODIUM 500 MG: 100 INJECTION, SOLUTION INTRAVENOUS at 08:39

## 2025-07-01 RX ADMIN — VALPROATE SODIUM 500 MG: 100 INJECTION, SOLUTION INTRAVENOUS at 21:07

## 2025-07-01 NOTE — CASE MANAGEMENT/SOCIAL WORK
Discharge Planning Assessment   Rudi     Patient Name: Rand Asencio  MRN: [id number]  Today's Date: 7/1/2025    Admit Date: 6/30/2025    Plan: Plan to return home with boyfriencheo and his mother Steff.   Discharge Needs Assessment       Row Name 07/01/25 1427       Living Environment    People in Home significant other    Name(s) of People in Home Fan power and his mother Steff    Current Living Arrangements home    Potentially Unsafe Housing Conditions none    In the past 12 months has the electric, gas, oil, or water company threatened to shut off services in your home? No    Primary Care Provided by self    Provides Primary Care For no one    Family Caregiver if Needed friend(s)    Family Caregiver Names Fan power and his mother Steff    Quality of Family Relationships helpful;involved;supportive    Able to Return to Prior Arrangements yes       Resource/Environmental Concerns    Resource/Environmental Concerns none    Transportation Concerns none       Transportation Needs    In the past 12 months, has lack of transportation kept you from medical appointments or from getting medications? no    In the past 12 months, has lack of transportation kept you from meetings, work, or from getting things needed for daily living? No       Food Insecurity    Within the past 12 months, you worried that your food would run out before you got the money to buy more. Never true    Within the past 12 months, the food you bought just didn't last and you didn't have money to get more. Never true       Transition Planning    Patient/Family Anticipates Transition to home    Patient/Family Anticipated Services at Transition none    Transportation Anticipated car, drives self;family or friend will provide       Discharge Needs Assessment    Readmission Within the Last 30 Days no previous admission in last 30 days    Equipment Currently Used at Home none    Concerns to be Addressed discharge planning     Anticipated Changes Related to Illness none    Equipment Needed After Discharge none                   Discharge Plan       Row Name 07/01/25 1428       Plan    Plan Plan to return home with boyfriencheo and his mother Steff.    Patient/Family in Agreement with Plan yes    Plan Comments CM met with patient at bedside. Patient intubated/sedated. Boyfriencheo Chavira and his mother Steff are her only contacts at this time, unable to reach her parents. Patient lives at home with boyfriencheo Chavira and his mother Steff who will transport at discharge. Patient performs ADLs. PCP and pharmacy confirmed. Agreeable to M2B.  Denies financial assistance needs for medication and/or food. Denies any current DME, HH, Caregiver, or rehab services. DC Barriers: vent 30/5, NPO NG TF, IV Keppra/Vimpat/Ativan, FC, A-line, PICC, restraints, Fent/Versed/Diprivan gtts, Psych/Neuro following.               Expected Discharge Date and Time       Expected Discharge Date Expected Discharge Time    Jul 3, 2025            Demographic Summary       Row Name 07/01/25 1425       General Information    Admission Type inpatient    Arrived From emergency department    Referral Source admission list    Reason for Consult discharge planning    Preferred Language English                   Functional Status       Row Name 07/01/25 1427       Functional Status    Usual Activity Tolerance good    Current Activity Tolerance poor       Functional Status, IADL    Medications independent    Meal Preparation independent    Housekeeping independent    Laundry independent    Shopping independent       Mental Status    General Appearance WDL WDL       Mental Status Summary    Recent Changes in Mental Status/Cognitive Functioning no changes             LUIS ENRIQUE Degroot RN  ICU/CVU   O: [phone]  C: [phone]  [email or web address]

## 2025-07-01 NOTE — CONSULTS
Arterial Catheter Insertion Procedure Note      Indications: hypovolemia    Active Time Out:  Correct patient: Yes  Correct procedure: Yes  Correct site: Yes  Verified with: aprn    Procedure Details:   Informed consent was obtained for the procedure.  Maximum sterile technique was used including usual patient drapes, antiseptics and sterile garments.    Collateral arterial circulation to hand confirmed through Carlitos's test. Under sterile conditions and utilizing ultrasound guidance, the skin above the R radial artery  was prepped with chlorhexidine and covered with a sterile drape. Local anesthesia was applied to the skin and subcutaneous tissues with lidocaine 1%. An 20-gauge needle was then inserted into the artery. Bright red pulsating blood return noted. A guide wire was then passed easily through the catheter. An angiocatheter was then inserted into the vessel over the guide wire. The catheter was secured in place and dressed following sterile protocol.    Line placed by LEAH morgan.    Complications:   none    Findings:  Patient  tolerated procedure well. Primary nurse notified.     Recommendations:  Line will be used for invasive hemodynamic monitoring and blood draws for ABG's.    Modesto Morgan RN  7/1/2025

## 2025-07-01 NOTE — CONSULTS
PICC Line Insertion Procedure Note    Procedure: Insertion of #5 FR/16G PICC    Indications:  vesicant    Active Time Out:  Correct patient: Yes  Correct procedure: Yes  Correct site: Yes  Verified with: aprn    Procedure Details:  Informed consent was obtained for the procedure.  Risk include, but are not limited to infection, air embolism, catheter tip moving, catheter blockage and phlebitis.     Maximum sterile technique was used including antiseptics, cap, gloves, gown, hand hygiene, mask, and sheet.    Ultrasound Guidance: Yes    #5 FR/16G PICC inserted to the R Basilic vein per hospital protocol by LEAH morgan.   Non-pulsatile blood return: yes    Lot #: [id number]  Expiration date: 1231225    Complications:  none    Findings:  Catheter inserted to 37 cm, with 0 cm exposed.   Mid upper arm circumference is 24 cm.   Catheter was flushed with 30 cc NS and sterile dressing applied.  Patient tolerated procedure well.  PICC tip verified by:       [x] Sapiens 3cg       [] Chest X-ray    Recommendations:  Verbal and/or written Care/Maintenance instructions provided to patient.   Primary nurse notified.    Modesto Morgan RN  07/01/25  13:11 EDT

## 2025-07-01 NOTE — PLAN OF CARE
Goal Outcome Evaluation:              Outcome Evaluation: pt remains intubated and sedated- propofol, versed, fent gtts going. garcia placed for urinary retention. PICC and A-line placed. pt does not follow commnds. facial edema present- MD aware. per poison control, vraylar and lamictal can cause facial edema.

## 2025-07-01 NOTE — PROGRESS NOTES
Events noted  Discussed with the team    Still intubated and sedated    Neuro no movements or other issues  The only response I got was corneals    She is being kept intubated and sedated to make sure we crossed the potential half-life of some of these medications as they may be in her system      Continue present AEDs

## 2025-07-01 NOTE — CONSULTS
Patient Name: Rand Asencio  YOB: 2005  MRN: [id number]  Admission date: 6/30/2025  Reason for Encounter: Tube Feed Consult    [facility] Clinical Nutrition Assessment     Subjective    Subjective Information     7/1: Admitted 6/30 for intentional suicide attempt.  Noted with rapid response event 6/30 in which patient experienced multiple seizures and was transferred to ICU level of care.  Patient intubated several hours later for airway protection.  RD consulted for TF.  Patient discussed in AM rounds.  Noted on versed, propofol at 11.79 mL/hour providing 311 kcal per day.  RD visited patient at bedside.  NFPE completed and not consistent with nutrition diagnosis of malnutrition at this time using AND/ASPEN criteria.       Begin Peptamen Intense VHP at 30 mL/hour + 10 mL/hour water flush          Assessment    H&P and Current Problems      H&P  Past Medical History:   Diagnosis Date    Allergic 2016    Last allergy test was with  at [facility] in [place] in 2024    Allergies     Anxiety 2024    Arthritis 2021    Due to multiple ankle sprains/surgery    Asthma     Depression 2025    Due to suicide attempt    Eating disorder 2019    Due to wrestling    Traumatic closed nondisplaced fracture of distal fibula, right, initial encounter 05/03/2021    Visual impairment 2019    Astigmatism      Past Surgical History:   Procedure Laterality Date    LEG TENDON REPAIR Right 01/24/2022    Procedure: TENDON REPAIR FOOT/TOE -posterior tibial tendon repair, Tarsal Tunnel decompression, biopsy of soft tissue mass;  Surgeon: DAMION Hammer DPM;  Location: [facility] MAIN OR;  Service: Podiatry;  Laterality: Right;    LYMPH NODE BIOPSY Right     neck      Current Problems   Admission Diagnosis:  Suicide attempt by drug ingestion, initial encounter [T50.902A]  Leukocytosis, unspecified type [D72.829]  Depression, unspecified depression type [F32.A]  Intentional overdose  "[T50.902A]    Problem List:    Intentional overdose      Other Applicable Nutrition Information:   - NG in place for TF       Anthropometrics      BMI, Height, Weight Estimated body mass index is 27.13 kg/m² as calculated from the following:    Height as of this encounter: 170.2 cm (67.01\").    Weight as of this encounter: 78.6 kg (173 lb 4.5 oz).    Weight Method: Bed scale       Trending Weight Changes 7/1: 173#, No weight loss        Weight History  Wt Readings from Last 20 Encounters:   06/30/25 0915 78.6 kg (173 lb 4.5 oz)   06/30/25 0545 75.7 kg (166 lb 14.2 oz)   06/30/25 0142 79.8 kg (176 lb)   06/17/25 1315 69 kg (152 lb 3.2 oz)   06/06/25 0205 70.4 kg (155 lb 3.3 oz)   06/05/25 1149 70.4 kg (155 lb 3.3 oz)   06/02/25 1921 70.4 kg (155 lb 3.3 oz)   05/16/25 0816 70.4 kg (155 lb 1.6 oz)   04/27/25 1250 71 kg (156 lb 8.4 oz)   04/25/25 0843 71.8 kg (158 lb 4.8 oz)   04/21/25 1554 58.1 kg (128 lb)   03/24/25 1226 68.4 kg (150 lb 12.7 oz)   03/18/25 1302 67.6 kg (149 lb 1.6 oz)   02/20/25 1626 64 kg (141 lb 1.5 oz) (70%, Z= 0.53)*   11/20/24 1209 63.6 kg (140 lb 3.4 oz) (70%, Z= 0.52)*   11/14/24 0904 62.8 kg (138 lb 7.2 oz) (67%, Z= 0.45)*   11/08/24 1253 61.2 kg (135 lb) (62%, Z= 0.32)*   09/10/24 1842 63.3 kg (139 lb 8.8 oz) (70%, Z= 0.51)*   09/10/24 1744 63.5 kg (140 lb) (70%, Z= 0.53)*   06/14/23 1019 61.2 kg (135 lb) (68%, Z= 0.47)*   06/04/23 1728 61.2 kg (135 lb) (68%, Z= 0.47)*   04/26/22 1100 61.7 kg (136 lb) (73%, Z= 0.62)*   03/17/22 1405 61.7 kg (136 lb) (73%, Z= 0.63)*     * Growth percentiles are based on [facility] (Girls, 2-20 Years) data.            Labs      Comment: Hypokalemia - replaced today      Results from last 7 days   Lab Units 07/01/25  0320 07/01/25  0319 06/30/25  2128 06/30/25  0844 06/30/25  0138   SODIUM mmol/L 138  --   --  139 138   POTASSIUM mmol/L 3.3*  --  4.0 3.5 3.6   GLUCOSE mg/dL 82  --   --  94 115*   BUN mg/dL 5.6*  --   --  7.3 9.6   CREATININE mg/dL 0.75  --   --  0.77 " 0.78   CALCIUM mg/dL 8.3*  --   --  8.6 9.2   IONIZED CALCIUM mmol/L  --  1.12*  --  1.07*  --    PHOSPHORUS mg/dL 2.9  --   --  3.1  --    MAGNESIUM mg/dL 2.1  --   --  2.0  --    ALBUMIN g/dL 3.8  --   --  4.3 4.8   LACTATE mmol/L  --  1.0  --   --   --    BILIRUBIN mg/dL 0.7  --   --  0.7 0.4   ALK PHOS U/L 46  --   --  49 54   AST (SGOT) U/L 23  --   --  31 34*   ALT (SGPT) U/L 10  --   --  17 21     Results from last 7 days   Lab Units 07/01/25  0320 06/30/25  0844 06/30/25  0138   PLATELETS 10*3/mm3 250 303 320   HEMOGLOBIN g/dL 10.2* 11.5* 11.9*   HEMATOCRIT % 31.9* 35.4 36.3     Lab Results   Component Value Date    HGBA1C 5.10 04/25/2025          Medications       Scheduled Medications Lacosamide, 50 mg, Intravenous, Q12H  levETIRAcetam, 500 mg, Intravenous, Q12H  lidocaine, 5 mL, Infiltration, Once  mupirocin, 1 Application, Each Nare, BID  sodium chloride, 10 mL, Intravenous, Q12H  valproate sodium, 500 mg, Intravenous, Q12H        Infusions fentanyl 10 mcg/mL,  mcg/hr, Last Rate: 75 mcg/hr (06/30/25 1750)  midazolam, 1-10 mg/hr, Last Rate: 4 mg/hr (07/01/25 0516)  propofol, 5-50 mcg/kg/min, Last Rate: 25 mcg/kg/min (07/01/25 0505)        PRN Medications   senna-docusate sodium **AND** polyethylene glycol **AND** bisacodyl **AND** bisacodyl    Calcium Replacement - Follow Nurse / BPA Driven Protocol    fentaNYL    Magnesium Standard Dose Replacement - Follow Nurse / BPA Driven Protocol    nitroglycerin    Phosphorus Replacement - Follow Nurse / BPA Driven Protocol    Potassium Replacement - Follow Nurse / BPA Driven Protocol    sodium chloride    sodium chloride     Physical Findings      Chewing/Swallowing  Teeth Status: Mouth/Teeth WDL: WDL    Chewing/Swallowing Issues: Intubated   Edema                            Bowel Function  Stool Output  Perineal Care: absorbent brief/pad changed, perineum cleansed (06/30/25 0915)      I/Os  Intake & Output (last 3 days)         06/28 0701  06/29 0700 06/29  0701  06/30 0700 06/30 0701  07/01 0700 07/01 0701  07/02 0700    P.O.   0     I.V. (mL/kg)   1439 (18.3)     NG/GT   100     IV Piggyback  1000      Total Intake(mL/kg)  1000 (13.2) 1539 (19.6)     Urine (mL/kg/hr)   1095 (0.6)     Total Output   1095     Net  +1000 +444                    Lines, Drains, Airways, & Wounds       Active LDAs       Name Placement date Placement time Site Days Last dressing change    Peripheral IV 18 G Anterior;Proximal;Right Forearm --  --  Forearm  --     Peripheral IV 06/30/25 1330 22 G Left Antecubital 06/30/25  1330  Antecubital  less than 1     Peripheral IV 06/30/25 1330 22 G Anterior;Left Wrist 06/30/25  1330  Wrist  less than 1     Peripheral IV 06/30/25 1610 18 G Anterior;Right Wrist 06/30/25  1610  Wrist  less than 1     NG/OG Tube Nasogastric Left nostril 06/30/25  1900  previous shift  Left nostril  less than 1     Hi-Lo Evac ETT 8 06/30/25  1302  Oral  less than 1     Wound 01/24/22 1436 Right medial foot Incision 01/24/22  1436  -- 1253                       Nutrition Focused Physical Exam     Trending NFPE 7/1: NFPE completed and not consistent with nutrition diagnosis of malnutrition at this time using AND/ASPEN criteria.        Malnutrition Severity Assessment              Estimated Needs    Estimated 7/1/25  Energy Requirements    Weight for Calculation 78.6 kg CBW   Method for Estimation  PSU   Daily Needs (kcal/day) 1766   Protein Requirements    Weight for Calculation 78.6 kg CBW   Method for Estimation 1.2-2.0 gm/kg   Daily Needs (g/day)    Fluid Requirements     Method for Estimation 1 mL/kcal    Daily Needs (mL/day)      Current Nutrition Orders & Evaluation of Intake      Oral Nutrition     Food Allergies  and Intolerances NKFA   Current PO Diet NPO Diet NPO Type: Strict NPO   Oral Nutrition Supplement None     Trending % PO Intake 7/1: NPO     Enteral Nutrition     Current EN Order Patient isn't on Tube Feeding  Patient doesn't have any tube feeding  modular orders     EN Route  NG tube     EN Tolerance     EN Observation/Intake         Parenteral Nutrition     Current TPN Order    TPN Route    Lipids (mL/%/frequency)     Total # Days on TPN    TPN Observation/Intake       Assessment & Plan   Nutrition Diagnosis and Goals       Nutrition Diagnosis 1 Inadequate Oral Intake related to clinical course as evidenced by NPO.       Nutrition Diagnosis 2          Goal(s) Initiate EN , Tolerates EN , and Maintain Weight     Nutrition Intervention and Prescription       Intervention  Start EN and Completed NFPE      Diet Prescription NPO    Supplement Prescription None     Education Provided  N/A     Enteral Prescription Initial Goal:  *initial goal conservative d/t risk of RFS     Peptamen Intense VHP at 30 mL/hr + 10 mL/hr water flush      End Goal:    Peptamen Intense VHP at 65 mL/hr + water flush per clinical picture      Calories  1430 kcals TF  311 propofol kcal   1741 kcal total (99%)    Protein  132 g (in range)    Free water  1201 mL   Flushes  Will monitor hydration status      The above end goal rate is for 22 hrs/day to assume interruptions for ADLs. Water flushes adjusted based on clinical picture + Rx flushes/IV fluids     Specialized formula chosen r/t high propofol allowance.           TPN Prescription      Monitoring/Evaluation       Monitor/Evaluation Per Protocol, Pertinent Labs, EN Delivery/Tolerance, Weight, and Hemodynamic Stability      RD Follow-Up Encounter 1-2 days     Electronically signed by:  Mehreen Vazquez RD  07/01/25 09:03 EDT

## 2025-07-01 NOTE — PROGRESS NOTES
Critical Care Progress Note     Rand Asencio : 2005 MRN:[id number] LOS:1  Rm: 2310/1     Principal Problem: Intentional overdose     Reason for follow up: All the medical problems listed below    Summary      Rand Asencio is a 20 y.o. old female patient with PMH of mood disorder with anxiety and depression, asthma and arthritis who presented to the ED on 2025 for evaluation after an intentional suicide attempt 30 minutes prior to arrival.  The following information has been obtained primarily from review of documentation and collaboration with other providers as the patient is postictal and encephalopathic at the time of examination in the ED.  The patient reported in the ED that she and her partner had gotten into an argument after which she was very upset and ingested her prescribed medications of Lamictal, fluoxetine, and Vraylar with the intention of suicide.  The patient admitted to the ED provider that she has a history of suicide attempts with cutting in the past.  The patient also reported generalized pain, chest pain, and nausea and vomiting.  Poison control was contacted by ED staff and the recommendation was an 8-hour observation at the hospital.  During the patient's time in the ED she had 2 episodes of seizure-like activity which were aborted by IV Ativan.  The patient was admitted and neurology was consulted.  She had at least 2 more witnessed seizure-like activity episodes therefore Keppra and Vimpat were initiated.  After she had several more witnessed episodes of seizure-like activity it was determined that it would benefit her to transfer to the ICU for further medical management and due to the risk of need of airway protection if she develops status epilepticus.  At the time of arrival to the ICU the patient was awake though with a blunted affect.  She was able to answer some questions of orientation however she has no insight into the current situation.  Her airway is self  protected and she is in no acute distress.     ACP: ACP documentation on file    Patient is on Hospital Day: 2.    Significant Events / Subjective     07/01/25 : The patient is intubated and heavily sedated. She required intubation for airway protection yesterday after multiple episodes of seizure-like activity. Not all of her episodes appeared to be true seizures however she did desaturate with some of the events. She also had episodes of decreased LOC, either feigned or due to seizure/post-ictal. No seizures overnight.  Due to the Lamictal overdose, the patient is at risk for Afust-Hilario syndrome.  She currently does not have a rash.  Oral examination is limited due to the presence of oral ET tube however the hard palate seems to be free of any kind of desquamating.  She remains afebrile.  Will start tube feeds as it is unclear how long the patient will need to be intubated.  The half-life of the medications she took have variable rates    Assessment / Plan     Acute respiratory failure with hypoxia  Intubated for airway protection and correction of hypoxia  Likely due to seizure/post-ictal & possible malingering  Ventilator settings noted and adjusted as needed.   Close monitoring of ABG.   Minimize sedation/analgesia to keep RASS of 0 to -1.    Acute toxic encephalopathy  Polysubstance ingestion  New onset seizure activity, likely attributed to medication ingestion lowering seizure threshold  CT head negative for any acute intracranial findings  Tox screen positive for THC  Neurology following  Keppra loading dose and then every 12 hours  Continue Depacon and Vimpat  EEG 6/30/2025: Nothing suggesting clear-cut epileptiform activity  Supportive care for now  Airway protection     Mood disorder with anxiety and depression  Intentional polysubstance ingestion  History of self-destructive behaviors including cutting  Poison control consulted  Consult behavioral health for behavioral medication management when  appropriate, currently intubated and sedated     Leukocytosis, likely reactive--resolved  Afebrile, no bandemia  Lactate <0.3  Monitor off antibiotics for now     History of asthma  DuoNebs as needed    Negative HCG      Disposition: ICU, intubated    Code status:   Code Status (Patient has no pulse and is not breathing): CPR (Attempt to Resuscitate)  Medical Interventions (Patient has pulse or is breathing): Full Support       Nutrition:   NPO Diet NPO Type: Strict NPO   Patient isn't on Tube Feeding     VTE Prophylaxis:  Mechanical VTE prophylaxis orders are present.           Objective / Physical Exam     Vital signs:  Temp: 97.3 °F (36.3 °C)  BP: 90/54  Heart Rate: 82  Resp: 16  SpO2: 100 %  Weight: 78.6 kg (173 lb 4.5 oz)    Admission Weight: Weight: 79.8 kg (176 lb)  Current Weight: Weight: 78.6 kg (173 lb 4.5 oz)    Input/Output in last 24 hours:    Intake/Output Summary (Last 24 hours) at 7/1/2025 0814  Last data filed at 7/1/2025 0400  Gross per 24 hour   Intake 1539 ml   Output 1095 ml   Net 444 ml      Net IO Since Admission: 1,444 mL [07/01/25 0814]     Physical Exam  Vitals and nursing note reviewed.   Constitutional:       General: She is not in acute distress.     Appearance: She is obese. She is ill-appearing.      Comments: Intubated and sedated   HENT:      Head: Normocephalic and atraumatic.      Right Ear: External ear normal.      Left Ear: External ear normal.      Mouth/Throat:      Comments: Oral ET tube  Eyes:      Conjunctiva/sclera: Conjunctivae normal.      Comments: Pupils pinpoint   Cardiovascular:      Heart sounds: Normal heart sounds, S1 normal and S2 normal. No murmur heard.     Comments: Sinus rhythm  Pulmonary:      Breath sounds: Normal breath sounds. No wheezing or rhonchi.      Comments: Mechanically ventilated  Abdominal:      General: There is no distension.      Palpations: Abdomen is soft.      Comments: Morbid body habitus  Hypoactive bowel sounds   Musculoskeletal:       Cervical back: Neck supple. No rigidity.      Left lower leg: No edema.   Skin:     General: Skin is warm and dry.   Neurological:      Comments: Intubated and sedated          Radiology and Labs     Results from last 7 days   Lab Units 07/01/25 0320 06/30/25 0844 06/30/25  0138   WBC 10*3/mm3 7.63 11.38* 19.55*   HEMOGLOBIN g/dL 10.2* 11.5* 11.9*   HEMATOCRIT % 31.9* 35.4 36.3   PLATELETS 10*3/mm3 250 303 320           Results from last 7 days   Lab Units 07/01/25 0320 06/30/25 2128 06/30/25 0844 06/30/25  0138   SODIUM mmol/L 138  --  139 138   POTASSIUM mmol/L 3.3* 4.0 3.5 3.6   CHLORIDE mmol/L 106  --  106 104   CO2 mmol/L 20.8*  --  17.6* 13.6*   ANION GAP mmol/L 11.2  --  15.4* 20.4*   BUN mg/dL 5.6*  --  7.3 9.6   CREATININE mg/dL 0.75  --  0.77 0.78   GLUCOSE mg/dL 82  --  94 115*   PHOSPHORUS mg/dL 2.9  --  3.1  --    MAGNESIUM mg/dL 2.1  --  2.0  --    ALT (SGPT) U/L 10  --  17 21   AST (SGOT) U/L 23  --  31 34*   ALK PHOS U/L 46  --  49 54      Results from last 7 days   Lab Units 07/01/25 0320 06/30/25 0844 06/30/25  0138   ALT (SGPT) U/L 10 17 21   AST (SGOT) U/L 23 31 34*   ALK PHOS U/L 46 49 54     Results from last 7 days   Lab Units 07/01/25  0249 06/30/25  1351   PH, ARTERIAL pH units 7.429 7.392   PCO2, ARTERIAL mm Hg 37.4 38.7   PO2 ART mm Hg 206.8* 82.4*   O2 SATURATION ART % 99.8* 96.0   FIO2 % 40 50   HCO3 ART mmol/L 24.8 23.5   BASE EXCESS ART mmol/L 0.5 -1.2*       XR Chest 1 View  Result Date: 6/30/2025  Impression: ET tube tip is at the orifice of the right mainstem bronchus. New retrocardiac left basilar airspace disease compared to earlier today suggesting developing mild left basilar atelectasis. Consider retracting CT to 3 cm to the level of the mid thoracic trachea, and repeat imaging to confirm placement.. The patient's nurse was unable to come to the phone. I spoke with the staff member at the nursing station for the ICU regarding pertinent findings and recommendations at the  time of this dictation. He stated he would convey this message to the patient's nurse. Electronically Signed: Shirin Murray MD  6/30/2025 1:59 PM EDT  Workstation ID: [id number]    XR Abdomen KUB  Result Date: 6/30/2025  Impression: Radiopaque tip of the Dobbhoff tube extends to the proximal gastric body level. Left lower lobe airspace disease. Correlate with chest radiograph findings. Electronically Signed: Shirin Murray MD  6/30/2025 1:51 PM EDT  Workstation ID: [id number]    XR Chest 1 View  Result Date: 6/30/2025  Impression: No acute cardiopulmonary abnormality. Electronically Signed: Apolinar Valenzuela MD  6/30/2025 2:45 AM EDT  Workstation ID: [id number]      Current medications     Scheduled Meds:   Lacosamide, 50 mg, Intravenous, Q12H  levETIRAcetam, 500 mg, Intravenous, Q12H  lidocaine, 5 mL, Infiltration, Once  mupirocin, 1 Application, Each Nare, BID  potassium chloride, 40 mEq, Oral, Q4H  sodium chloride, 10 mL, Intravenous, Q12H  valproate sodium, 500 mg, Intravenous, Q12H        Continuous Infusions:   fentanyl 10 mcg/mL,  mcg/hr, Last Rate: 75 mcg/hr (06/30/25 1750)  midazolam, 1-10 mg/hr, Last Rate: 4 mg/hr (07/01/25 0516)  propofol, 5-50 mcg/kg/min, Last Rate: 25 mcg/kg/min (07/01/25 0505)          Plan discussed with RN. Reviewed all other data in the last 24 hours, including but not limited to vitals, labs, microbiology, imaging and pertinent notes from other providers.     MARIAELENA Tesfaye   Critical Care  07/01/25   08:14 EDT

## 2025-07-01 NOTE — PLAN OF CARE
Goal Outcome Evaluation:      Pt remains intubated and heavily sedated. Versed, fent, prop gtts infusing. Straight cathed once this shift. ~520 out. No BM. NG clamped. 40%/5 PEEP.   BP borderline, maintaining MAP >65. Vitals otherwise. WNL. Afebrile.

## 2025-07-02 LAB
ALBUMIN SERPL-MCNC: 3.8 G/DL (ref 3.5–5.2)
ALBUMIN/GLOB SERPL: 1.8 G/DL
ALP SERPL-CCNC: 43 U/L (ref 39–117)
ALT SERPL W P-5'-P-CCNC: 12 U/L (ref 1–33)
ANION GAP SERPL CALCULATED.3IONS-SCNC: 10 MMOL/L (ref 5–15)
ARTERIAL PATENCY WRIST A: ABNORMAL
AST SERPL-CCNC: 26 U/L (ref 1–32)
ATMOSPHERIC PRESS: ABNORMAL MM[HG]
BASE EXCESS BLDA CALC-SCNC: 0.6 MMOL/L (ref 0–3)
BASOPHILS # BLD AUTO: 0.02 10*3/MM3 (ref 0–0.2)
BASOPHILS NFR BLD AUTO: 0.3 % (ref 0–1.5)
BDY SITE: ABNORMAL
BILIRUB SERPL-MCNC: 0.4 MG/DL (ref 0–1.2)
BUN SERPL-MCNC: 8 MG/DL (ref 6–20)
BUN/CREAT SERPL: 11.8 (ref 7–25)
CALCIUM SPEC-SCNC: 8.5 MG/DL (ref 8.6–10.5)
CHLORIDE SERPL-SCNC: 108 MMOL/L (ref 98–107)
CO2 BLDA-SCNC: 26.6 MMOL/L (ref 22–29)
CO2 SERPL-SCNC: 21 MMOL/L (ref 22–29)
CREAT SERPL-MCNC: 0.68 MG/DL (ref 0.57–1)
DEPRECATED RDW RBC AUTO: 43.1 FL (ref 37–54)
DEVICE COMMENT: ABNORMAL
EGFRCR SERPLBLD CKD-EPI 2021: 128 ML/MIN/1.73
EOSINOPHIL # BLD AUTO: 0.06 10*3/MM3 (ref 0–0.4)
EOSINOPHIL NFR BLD AUTO: 0.9 % (ref 0.3–6.2)
ERYTHROCYTE [DISTWIDTH] IN BLOOD BY AUTOMATED COUNT: 13.9 % (ref 12.3–15.4)
GLOBULIN UR ELPH-MCNC: 2.1 GM/DL
GLUCOSE BLDC GLUCOMTR-MCNC: 128 MG/DL (ref 70–105)
GLUCOSE BLDC GLUCOMTR-MCNC: 68 MG/DL (ref 70–105)
GLUCOSE SERPL-MCNC: 90 MG/DL (ref 65–99)
HCO3 BLDA-SCNC: 25.4 MMOL/L (ref 21–28)
HCT VFR BLD AUTO: 31.5 % (ref 34–46.6)
HEMODILUTION: NO
HGB BLD-MCNC: 10.1 G/DL (ref 12–15.9)
IMM GRANULOCYTES # BLD AUTO: 0.02 10*3/MM3 (ref 0–0.05)
IMM GRANULOCYTES NFR BLD AUTO: 0.3 % (ref 0–0.5)
INHALED O2 CONCENTRATION: 30 %
LYMPHOCYTES # BLD AUTO: 1.16 10*3/MM3 (ref 0.7–3.1)
LYMPHOCYTES NFR BLD AUTO: 17 % (ref 19.6–45.3)
MAGNESIUM SERPL-MCNC: 2.2 MG/DL (ref 1.7–2.2)
MCH RBC QN AUTO: 27.2 PG (ref 26.6–33)
MCHC RBC AUTO-ENTMCNC: 32.1 G/DL (ref 31.5–35.7)
MCV RBC AUTO: 84.9 FL (ref 79–97)
MODALITY: ABNORMAL
MONOCYTES # BLD AUTO: 0.72 10*3/MM3 (ref 0.1–0.9)
MONOCYTES NFR BLD AUTO: 10.6 % (ref 5–12)
NEUTROPHILS NFR BLD AUTO: 4.83 10*3/MM3 (ref 1.7–7)
NEUTROPHILS NFR BLD AUTO: 70.9 % (ref 42.7–76)
NRBC BLD AUTO-RTO: 0 /100 WBC (ref 0–0.2)
PCO2 BLDA: 40.2 MM HG (ref 35–48)
PEEP RESPIRATORY: 5 CM[H2O]
PH BLDA: 7.41 PH UNITS (ref 7.35–7.45)
PHOSPHATE SERPL-MCNC: 4.1 MG/DL (ref 2.5–4.5)
PLATELET # BLD AUTO: 235 10*3/MM3 (ref 140–450)
PMV BLD AUTO: 10.2 FL (ref 6–12)
PO2 BLD: 451 MM[HG] (ref 0–500)
PO2 BLDA: 135.4 MM HG (ref 83–108)
POTASSIUM SERPL-SCNC: 3.8 MMOL/L (ref 3.5–5.2)
PROT SERPL-MCNC: 5.9 G/DL (ref 6–8.5)
QT INTERVAL: 365 MS
QTC INTERVAL: 447 MS
RBC # BLD AUTO: 3.71 10*6/MM3 (ref 3.77–5.28)
RESPIRATORY RATE: 16
SAO2 % BLDCOA: 99.1 % (ref 94–98)
SODIUM SERPL-SCNC: 139 MMOL/L (ref 136–145)
TRIGL SERPL-MCNC: 104 MG/DL (ref 0–150)
VENTILATOR MODE: AC
VT ON VENT VENT: 450 ML
WBC NRBC COR # BLD AUTO: 6.81 10*3/MM3 (ref 3.4–10.8)

## 2025-07-02 PROCEDURE — 25010000002 METHYLPREDNISOLONE PER 125 MG: Performed by: INTERNAL MEDICINE

## 2025-07-02 PROCEDURE — 94003 VENT MGMT INPAT SUBQ DAY: CPT

## 2025-07-02 PROCEDURE — 25010000002 LACOSAMIDE 200 MG/20ML SOLUTION: Performed by: PSYCHIATRY & NEUROLOGY

## 2025-07-02 PROCEDURE — 94799 UNLISTED PULMONARY SVC/PX: CPT

## 2025-07-02 PROCEDURE — 93010 ELECTROCARDIOGRAM REPORT: CPT | Performed by: INTERNAL MEDICINE

## 2025-07-02 PROCEDURE — 83735 ASSAY OF MAGNESIUM: CPT

## 2025-07-02 PROCEDURE — 84478 ASSAY OF TRIGLYCERIDES: CPT | Performed by: INTERNAL MEDICINE

## 2025-07-02 PROCEDURE — 25010000002 LEVETRIRACETAM PER 10 MG: Performed by: PSYCHIATRY & NEUROLOGY

## 2025-07-02 PROCEDURE — 80053 COMPREHEN METABOLIC PANEL: CPT

## 2025-07-02 PROCEDURE — 82948 REAGENT STRIP/BLOOD GLUCOSE: CPT

## 2025-07-02 PROCEDURE — 94761 N-INVAS EAR/PLS OXIMETRY MLT: CPT

## 2025-07-02 PROCEDURE — 25010000002 ENOXAPARIN PER 10 MG: Performed by: NURSE PRACTITIONER

## 2025-07-02 PROCEDURE — 25010000002 PROPOFOL 10 MG/ML EMULSION: Performed by: NURSE PRACTITIONER

## 2025-07-02 PROCEDURE — 85025 COMPLETE CBC W/AUTO DIFF WBC: CPT

## 2025-07-02 PROCEDURE — 94640 AIRWAY INHALATION TREATMENT: CPT

## 2025-07-02 PROCEDURE — 25010000002 BUMETANIDE PER 0.5 MG: Performed by: INTERNAL MEDICINE

## 2025-07-02 PROCEDURE — 82803 BLOOD GASES ANY COMBINATION: CPT

## 2025-07-02 PROCEDURE — 84100 ASSAY OF PHOSPHORUS: CPT

## 2025-07-02 PROCEDURE — C9254 INJECTION, LACOSAMIDE: HCPCS | Performed by: PSYCHIATRY & NEUROLOGY

## 2025-07-02 PROCEDURE — 93005 ELECTROCARDIOGRAM TRACING: CPT | Performed by: INTERNAL MEDICINE

## 2025-07-02 RX ORDER — FLUVOXAMINE MALEATE 50 MG
100 TABLET ORAL NIGHTLY
Status: DISCONTINUED | OUTPATIENT
Start: 2025-07-02 | End: 2025-07-08 | Stop reason: HOSPADM

## 2025-07-02 RX ORDER — METHYLPREDNISOLONE SODIUM SUCCINATE 125 MG/2ML
80 INJECTION, POWDER, LYOPHILIZED, FOR SOLUTION INTRAMUSCULAR; INTRAVENOUS EVERY 8 HOURS SCHEDULED
Status: COMPLETED | OUTPATIENT
Start: 2025-07-02 | End: 2025-07-04

## 2025-07-02 RX ORDER — BUMETANIDE 0.25 MG/ML
1 INJECTION, SOLUTION INTRAMUSCULAR; INTRAVENOUS EVERY 8 HOURS SCHEDULED
Status: DISCONTINUED | OUTPATIENT
Start: 2025-07-02 | End: 2025-07-03

## 2025-07-02 RX ORDER — DEXMEDETOMIDINE HYDROCHLORIDE 4 UG/ML
.2-1.5 INJECTION, SOLUTION INTRAVENOUS
Status: DISCONTINUED | OUTPATIENT
Start: 2025-07-02 | End: 2025-07-04

## 2025-07-02 RX ORDER — LAMOTRIGINE 100 MG/1
100 TABLET ORAL EVERY 12 HOURS SCHEDULED
Status: DISCONTINUED | OUTPATIENT
Start: 2025-07-02 | End: 2025-07-08 | Stop reason: HOSPADM

## 2025-07-02 RX ORDER — BUDESONIDE 0.5 MG/2ML
0.5 INHALANT ORAL 2 TIMES DAILY
Status: DISCONTINUED | OUTPATIENT
Start: 2025-07-02 | End: 2025-07-08 | Stop reason: HOSPADM

## 2025-07-02 RX ORDER — CETIRIZINE HYDROCHLORIDE 10 MG/1
10 TABLET ORAL DAILY
Status: DISCONTINUED | OUTPATIENT
Start: 2025-07-02 | End: 2025-07-06

## 2025-07-02 RX ORDER — IPRATROPIUM BROMIDE AND ALBUTEROL SULFATE 2.5; .5 MG/3ML; MG/3ML
3 SOLUTION RESPIRATORY (INHALATION) EVERY 4 HOURS PRN
Status: DISCONTINUED | OUTPATIENT
Start: 2025-07-02 | End: 2025-07-08 | Stop reason: HOSPADM

## 2025-07-02 RX ORDER — DEXTROSE MONOHYDRATE 25 G/50ML
25 INJECTION, SOLUTION INTRAVENOUS
Status: DISCONTINUED | OUTPATIENT
Start: 2025-07-02 | End: 2025-07-08 | Stop reason: HOSPADM

## 2025-07-02 RX ADMIN — DEXTROSE MONOHYDRATE 25 G: 25 INJECTION, SOLUTION INTRAVENOUS at 00:29

## 2025-07-02 RX ADMIN — BUDESONIDE 0.5 MG: 0.5 SUSPENSION RESPIRATORY (INHALATION) at 19:18

## 2025-07-02 RX ADMIN — BUMETANIDE 1 MG: 0.25 INJECTION INTRAMUSCULAR; INTRAVENOUS at 21:09

## 2025-07-02 RX ADMIN — CETIRIZINE HYDROCHLORIDE 10 MG: 10 TABLET, FILM COATED ORAL at 10:18

## 2025-07-02 RX ADMIN — Medication 10 ML: at 08:17

## 2025-07-02 RX ADMIN — MUPIROCIN 1 APPLICATION: 20 OINTMENT TOPICAL at 21:09

## 2025-07-02 RX ADMIN — LACOSAMIDE 50 MG: 10 INJECTION INTRAVENOUS at 08:17

## 2025-07-02 RX ADMIN — LEVETIRACETAM 500 MG: 500 INJECTION, SOLUTION INTRAVENOUS at 08:17

## 2025-07-02 RX ADMIN — METHYLPREDNISOLONE SODIUM SUCCINATE 80 MG: 125 INJECTION, POWDER, FOR SOLUTION INTRAMUSCULAR; INTRAVENOUS at 13:57

## 2025-07-02 RX ADMIN — ENOXAPARIN SODIUM 40 MG: 100 INJECTION SUBCUTANEOUS at 15:47

## 2025-07-02 RX ADMIN — LACOSAMIDE 50 MG: 10 INJECTION INTRAVENOUS at 21:09

## 2025-07-02 RX ADMIN — DEXMEDETOMIDINE HYDROCHLORIDE 0.5 MCG/KG/HR: 4 INJECTION, SOLUTION INTRAVENOUS at 10:18

## 2025-07-02 RX ADMIN — MUPIROCIN 1 APPLICATION: 20 OINTMENT TOPICAL at 08:17

## 2025-07-02 RX ADMIN — BUDESONIDE 0.5 MG: 0.5 SUSPENSION RESPIRATORY (INHALATION) at 10:55

## 2025-07-02 RX ADMIN — VALPROATE SODIUM 500 MG: 100 INJECTION, SOLUTION INTRAVENOUS at 08:17

## 2025-07-02 RX ADMIN — LEVETIRACETAM 500 MG: 500 INJECTION, SOLUTION INTRAVENOUS at 21:09

## 2025-07-02 RX ADMIN — LANSOPRAZOLE 30 MG: 30 TABLET, ORALLY DISINTEGRATING ORAL at 05:05

## 2025-07-02 RX ADMIN — Medication 10 ML: at 21:10

## 2025-07-02 RX ADMIN — PROPOFOL 20 MCG/KG/MIN: 10 INJECTION, EMULSION INTRAVENOUS at 00:29

## 2025-07-02 RX ADMIN — METHYLPREDNISOLONE SODIUM SUCCINATE 80 MG: 125 INJECTION, POWDER, FOR SOLUTION INTRAMUSCULAR; INTRAVENOUS at 21:08

## 2025-07-02 RX ADMIN — VALPROATE SODIUM 500 MG: 100 INJECTION, SOLUTION INTRAVENOUS at 21:09

## 2025-07-02 RX ADMIN — BUMETANIDE 1 MG: 0.25 INJECTION INTRAMUSCULAR; INTRAVENOUS at 13:57

## 2025-07-02 NOTE — PLAN OF CARE
Goal Outcome Evaluation:              Outcome Evaluation: pt remains intubated. off all sedation. not following commands. limited pain reposnse. garcia in place per MD order for urinary retention. PICC and a-line d/c today. tube feeds continued.

## 2025-07-02 NOTE — PROGRESS NOTES
Events noted  Patient still on some sedation but being decreased  Now responsive to verbal and painful stimuli and opens her eyes but obviously does not follow commands    Nothing suggesting further seizures    I talked to her boyfriend's mother who is at bedside    I talked with the patient's nurse    Continue present medication and will follow-up

## 2025-07-02 NOTE — PROGRESS NOTES
Patient Name: Rand Asencio  YOB: 2005  MRN: [id number]  Admission date: 6/30/2025  Reason for Encounter: Follow-up/Progress Note      [facility] Nutrition Progress Note       Nutrition Intervention Updates: Increase TF's to goal rate of 65 ml/hr    Monitor medications and adjust regimen as needed       Subjective: Progress note to check on TF. Labs are stable, will increase feedings to goal rate. Discussed during ICU rounds. Propofol stopped this morning. Pt is on versed, fentanyl. If propofol stays off, may need to adjust TF regimen to provide more calories.        PO Diet: NPO Diet NPO Type: Strict NPO   PO Supplements: None    PO Intake:  NPO       Current nutrition support: Peptamen intense VHP at 30 ml/hr + 10 ml/hr FWF    Nutrition support review: Running as ordered per EMR        Labs: Reviewed.         GI Function:  No BM yet - pt has stool softeners ordered PRN         Brief Weight Review:    Wt Readings from Last 1 Encounters:   06/30/25 0915 78.6 kg (173 lb 4.5 oz)   06/30/25 0545 75.7 kg (166 lb 14.2 oz)   06/30/25 0142 79.8 kg (176 lb)        Results from last 7 days   Lab Units 07/02/25  0354 07/01/25  1524 07/01/25  0320 06/30/25  2128 06/30/25  0844   SODIUM mmol/L 139  --  138  --  139   POTASSIUM mmol/L 3.8 4.2 3.3*   < > 3.5   CHLORIDE mmol/L 108*  --  106  --  106   CO2 mmol/L 21.0*  --  20.8*  --  17.6*   BUN mg/dL 8.0  --  5.6*  --  7.3   CREATININE mg/dL 0.68  --  0.75  --  0.77   CALCIUM mg/dL 8.5*  --  8.3*  --  8.6   BILIRUBIN mg/dL 0.4  --  0.7  --  0.7   ALK PHOS U/L 43  --  46  --  49   ALT (SGPT) U/L 12  --  10  --  17   AST (SGOT) U/L 26  --  23  --  31   GLUCOSE mg/dL 90  --  82  --  94    < > = values in this interval not displayed.     Results from last 7 days   Lab Units 07/02/25  0944 07/02/25  0354 07/01/25  0320 06/30/25  0844   MAGNESIUM mg/dL  --  2.2 2.1 2.0   PHOSPHORUS mg/dL  --  4.1 2.9 3.1   PLATELETS 10*3/mm3  --  235 250 303   HEMOGLOBIN  g/dL  --  10.1* 10.2* 11.5*   HEMATOCRIT %  --  31.5* 31.9* 35.4   TRIGLYCERIDES mg/dL 104  --   --   --      Lab Results   Component Value Date    HGBA1C 5.10 04/25/2025       Electronically signed by:  Jessica Dorsey RD  07/02/25 13:33 EDT

## 2025-07-02 NOTE — PLAN OF CARE
Goal Outcome Evaluation:      Withdraws from pain intermittently. Holder catheter discontinued. Afebrile. Titrating down sedation. Continues to have facial and neck swelling.

## 2025-07-02 NOTE — PROGRESS NOTES
Critical Care Progress Note     Rand Asencio : 2005 MRN:[id number] LOS:2  Rm: 2310/1     Principal Problem: Intentional overdose     Reason for follow up: All the medical problems listed below    Summary      Rand Asencio is a 20 y.o. old female patient with PMH of mood disorder with anxiety and depression, asthma and arthritis who presented to the ED on 2025 for evaluation after an intentional suicide attempt 30 minutes prior to arrival.  The following information has been obtained primarily from review of documentation and collaboration with other providers as the patient is postictal and encephalopathic at the time of examination in the ED.  The patient reported in the ED that she and her partner had gotten into an argument after which she was very upset and ingested her prescribed medications of Lamictal, fluoxetine, and Vraylar with the intention of suicide.  The patient admitted to the ED provider that she has a history of suicide attempts with cutting in the past.  The patient also reported generalized pain, chest pain, and nausea and vomiting.  Poison control was contacted by ED staff and the recommendation was an 8-hour observation at the hospital.  During the patient's time in the ED she had 2 episodes of seizure-like activity which were aborted by IV Ativan.  The patient was admitted and neurology was consulted.  She had at least 2 more witnessed seizure-like activity episodes therefore Keppra and Vimpat were initiated.  After she had several more witnessed episodes of seizure-like activity it was determined that it would benefit her to transfer to the ICU for further medical management and due to the risk of need of airway protection if she develops status epilepticus.  At the time of arrival to the ICU the patient was awake though with a blunted affect.  She was able to answer some questions of orientation however she has no insight into the current situation.  Her airway is self  protected and she is in no acute distress.     Daily progress:  7/1: The patient is intubated and heavily sedated. She required intubation for airway protection yesterday after multiple episodes of seizure-like activity. Not all of her episodes appeared to be true seizures however she did desaturate with some of the events. She also had episodes of decreased LOC, either feigned or due to seizure/post-ictal. No seizures overnight.  Due to the Lamictal overdose, the patient is at risk for Faust-Hilario syndrome.  She currently does not have a rash.  Oral examination is limited due to the presence of oral ET tube however the hard palate seems to be free of any kind of desquamating.  She remains afebrile.  Will start tube feeds as it is unclear how long the patient will need to be intubated.  The half-life of the medications she took have variable rates     ACP: ACP documentation on file    Patient is on Hospital Day: 3.    Significant Events / Subjective     07/02/25 : Continues with facial swelling. No current rash visualized. Replace garcia catheter as patient ingested anticholinergic medications preventing her for spontaneous urination, please discuss with MD on this patient before following protocol due to more specific nature or it's placement; required straight cath x 3 overnight, without any spontaneous urination. Still with swelling of face and neck.  Trial of Bumex this morning. More awake, but not following commands; preventing appropriateness for extubation.  No seizure like activity visualized. Tube feeding planned to be initiated today. Labs reviewed and within appropriate parameters.  Reviewed home medications, restarting appropriate medications.     Assessment / Plan     Acute respiratory failure with hypoxia / on mechanically assisted ventilation  Likely due to seizure/post-ictal & possible malingering. Intubated for airway protection and correction of hypoxia on 6/29.  Ventilator settings noted and  adjusted as needed.   Close monitoring of ABG.   Minimize sedation/analgesia to keep RASS of 0 to -1. Switching from Propofol/Versed/Fentanyl to Precedex and low-dose Fentanyl.  Mentation and facial swelling preventing appropriateness to extubation.    Acute toxic encephalopathy  Polysubstance ingestion  New onset seizure activity, likely attributed to medication ingestion lowering seizure threshold  CT head negative for any acute intracranial findings  Tox screen positive for THC  Negative HCG.  Neurology following  Keppra loading dose and then every 12 hours  Continue Keppra, Depacon, and Vimpat.  EEG 6/30/2025: Nothing suggesting clear-cut epileptiform activity.  Supportive care for now.  Intubated for airway protection & hypoxia on 6/29.  Replace garcia catheter as patient ingested anticholinergic medications preventing her for spontaneous urination, please discuss with MD on this patient before following protocol due to more specific nature or it's placement.    Mood disorder with anxiety and depression  Intentional polysubstance ingestion  History of self-destructive behaviors including cutting  Poison control previously consulted, recommendations received and are in place as appropriate.  Consult behavioral health for behavioral medication management when appropriate, currently intubated and sedated     History of asthma  Continue budesonide per home medications.  DuoNebs as needed        Disposition: ICU, intubated    Code status:   Code Status (Patient has no pulse and is not breathing): CPR (Attempt to Resuscitate)  Medical Interventions (Patient has pulse or is breathing): Full Support       Nutrition:   NPO Diet NPO Type: Strict NPO   Tube Feeding: Formula: Peptamen Intense VHP (Vital HP); Feeding Type: Continuous; Start at: 30 mL/hr; Then Advance By: Do Not Advance; Water Flush: 10 mL; Every: 1 hour; Water Bolus: None     VTE Prophylaxis:  Pharmacologic & mechanical VTE prophylaxis orders are  present.    Objective / Physical Exam     Vital signs:  Temp: 98.8 °F (37.1 °C)  BP: 92/56  Heart Rate: 80  Resp: 16  SpO2: 100 %  Weight: 78.6 kg (173 lb 4.5 oz)    Admission Weight: Weight: 79.8 kg (176 lb)  Current Weight: Weight: 78.6 kg (173 lb 4.5 oz)    Input/Output in last 24 hours:    Intake/Output Summary (Last 24 hours) at 7/2/2025 1003  Last data filed at 7/2/2025 0341  Gross per 24 hour   Intake 1534 ml   Output 955 ml   Net 579 ml      Net IO Since Admission: 2,023 mL [07/02/25 1003]     Physical Exam  Vitals and nursing note reviewed.   Constitutional:       General: She is not in acute distress.     Appearance: She is obese. She is ill-appearing. She is not toxic-appearing.      Comments: Intubated and sedated   HENT:      Head: Normocephalic and atraumatic.      Right Ear: External ear normal.      Left Ear: External ear normal.      Nose:      Comments: Left NGT in place.     Mouth/Throat:      Mouth: Mucous membranes are moist.      Pharynx: Oropharynx is clear.      Comments: Oral ET tube  Swollen, no obvious palatal rash noted.  Eyes:      Comments: Eyelids closed.   Neck:      Comments: Lower face and neck swollen and edematous.  Cardiovascular:      Rate and Rhythm: Normal rate and regular rhythm.      Pulses: Normal pulses.      Heart sounds: Normal heart sounds, S1 normal and S2 normal. No murmur heard.     Comments: Sinus rhythm  Pulmonary:      Breath sounds: Normal breath sounds. No wheezing or rhonchi.      Comments: Mechanically ventilated  Abdominal:      General: There is no distension.      Palpations: Abdomen is soft.      Tenderness: There is no abdominal tenderness.      Comments: Morbid body habitus  Hypoactive bowel sounds   Musculoskeletal:      Cervical back: Neck supple. No rigidity.      Left lower leg: No edema.   Skin:     General: Skin is warm and dry.      Findings: No rash.   Neurological:      General: No focal deficit present.      Comments: Intubated and sedated,  awakens, but not following commands, spontaneously moving extremities.   Psychiatric:      Comments: Intubated, sedated.          Radiology and Labs     Results from last 7 days   Lab Units 07/02/25  0354 07/01/25 0320 06/30/25 0844 06/30/25  0138   WBC 10*3/mm3 6.81 7.63 11.38* 19.55*   HEMOGLOBIN g/dL 10.1* 10.2* 11.5* 11.9*   HEMATOCRIT % 31.5* 31.9* 35.4 36.3   PLATELETS 10*3/mm3 235 250 303 320           Results from last 7 days   Lab Units 07/02/25  0354 07/01/25  1524 07/01/25 0320 06/30/25 2128 06/30/25 0844 06/30/25  0138   SODIUM mmol/L 139  --  138  --  139 138   POTASSIUM mmol/L 3.8 4.2 3.3* 4.0 3.5 3.6   CHLORIDE mmol/L 108*  --  106  --  106 104   CO2 mmol/L 21.0*  --  20.8*  --  17.6* 13.6*   ANION GAP mmol/L 10.0  --  11.2  --  15.4* 20.4*   BUN mg/dL 8.0  --  5.6*  --  7.3 9.6   CREATININE mg/dL 0.68  --  0.75  --  0.77 0.78   GLUCOSE mg/dL 90  --  82  --  94 115*   PHOSPHORUS mg/dL 4.1  --  2.9  --  3.1  --    MAGNESIUM mg/dL 2.2  --  2.1  --  2.0  --    ALT (SGPT) U/L 12  --  10  --  17 21   AST (SGOT) U/L 26  --  23  --  31 34*   ALK PHOS U/L 43  --  46  --  49 54      Results from last 7 days   Lab Units 07/02/25  0354 07/01/25 0320 06/30/25 0844 06/30/25  0138   ALT (SGPT) U/L 12 10 17 21   AST (SGOT) U/L 26 23 31 34*   ALK PHOS U/L 43 46 49 54     Results from last 7 days   Lab Units 07/02/25  0343 07/01/25  0249 06/30/25  1351   PH, ARTERIAL pH units 7.408 7.429 7.392   PCO2, ARTERIAL mm Hg 40.2 37.4 38.7   PO2 ART mm Hg 135.4* 206.8* 82.4*   O2 SATURATION ART % 99.1* 99.8* 96.0   FIO2 % 30 40 50   HCO3 ART mmol/L 25.4 24.8 23.5   BASE EXCESS ART mmol/L 0.6 0.5 -1.2*       XR Chest 1 View  Result Date: 6/30/2025  Impression: ET tube tip is at the orifice of the right mainstem bronchus. New retrocardiac left basilar airspace disease compared to earlier today suggesting developing mild left basilar atelectasis. Consider retracting CT to 3 cm to the level of the mid thoracic trachea, and  repeat imaging to confirm placement.. The patient's nurse was unable to come to the phone. I spoke with the staff member at the nursing station for the ICU regarding pertinent findings and recommendations at the time of this dictation. He stated he would convey this message to the patient's nurse. Electronically Signed: Shirin Murray MD  6/30/2025 1:59 PM EDT  Workstation ID: [id number]    XR Abdomen KUB  Result Date: 6/30/2025  Impression: Radiopaque tip of the Dobbhoff tube extends to the proximal gastric body level. Left lower lobe airspace disease. Correlate with chest radiograph findings. Electronically Signed: Shirin Murray MD  6/30/2025 1:51 PM EDT  Workstation ID: [id number]      Current medications     Scheduled Meds:   budesonide, 0.5 mg, Nebulization, BID  bumetanide, 1 mg, Intravenous, Q8H  [Held by provider] Cariprazine HCl, 1.5 mg, Oral, Daily  cetirizine, 10 mg, Nasogastric, Daily  enoxaparin sodium, 40 mg, Subcutaneous, Q24H  [Held by provider] fluvoxaMINE, 100 mg, Oral, Nightly  Lacosamide, 50 mg, Intravenous, Q12H  [Held by provider] lamoTRIgine, 100 mg, Oral, Q12H  lansoprazole, 30 mg, Nasogastric, Q AM  levETIRAcetam, 500 mg, Intravenous, Q12H  methylPREDNISolone sodium succinate, 80 mg, Intravenous, Q8H  mupirocin, 1 Application, Each Nare, BID  sodium chloride, 10 mL, Intravenous, Q12H  valproate sodium, 500 mg, Intravenous, Q12H        Continuous Infusions:   dexmedetomidine, 0.2-1.5 mcg/kg/hr  fentanyl 10 mcg/mL,  mcg/hr, Last Rate: 75 mcg/hr (07/01/25 1722)  midazolam, 1-10 mg/hr, Last Rate: 2 mg/hr (07/02/25 2152)  propofol, 5-50 mcg/kg/min, Last Rate: 20 mcg/kg/min (07/02/25 0029)      Total critical care time: Approximately 32 minutes    Due to a high probability of clinically significant, life threatening deterioration, the patient required my highest level of preparedness to intervene emergently and I personally spent this critical care time directly and personally managing the  patient. This critical care time included obtaining a history; examining the patient; pulse oximetry; ordering and review of studies; arranging urgent treatment with development of a management plan; evaluation of patient's response to treatment; frequent reassessment; and, discussions with other providers.    This critical care time was performed to assess and manage the high probability of imminent, life-threatening deterioration that could result in multi-organ failure. It was exclusive of separately billable procedures and treating other patients and teaching time.      MARIAELENA Harper   Critical Care  07/02/25   10:03 EDT

## 2025-07-02 NOTE — CASE MANAGEMENT/SOCIAL WORK
Continued Stay Note  [facility]     Patient Name: Rand Asencio  MRN: [id number]  Today's Date: 7/2/2025    Admit Date: 6/30/2025    Plan: Plan to return home with boyfriend and his mother Steff.   Discharge Plan       Row Name 07/02/25 0916       Plan    Plan Plan to return home with boyfriend and his mother Steff.    Plan Comments DC Barriers: vent 30/5, NPO NG TF, IV Keppra/Vimpat, FC, A-line, PICC, restraints, Fent/Versed/Dex gtts, Psych/Neuro following.                 Expected Discharge Date and Time       Expected Discharge Date Expected Discharge Time    Jul 8, 2025               LUIS ENRIQUE Degroot RN  ICU/CVU   O: [phone]  C: [phone]  [email or web address]

## 2025-07-03 ENCOUNTER — APPOINTMENT (OUTPATIENT)
Dept: CARDIOLOGY | Facility: HOSPITAL | Age: 20
End: 2025-07-03
Payer: COMMERCIAL

## 2025-07-03 LAB
ABO GROUP BLD: NORMAL
ABO GROUP BLD: NORMAL
ALBUMIN SERPL-MCNC: 4.2 G/DL (ref 3.5–5.2)
ALBUMIN/GLOB SERPL: 1.4 G/DL
ALP SERPL-CCNC: 56 U/L (ref 39–117)
ALT SERPL W P-5'-P-CCNC: 19 U/L (ref 1–33)
ANION GAP SERPL CALCULATED.3IONS-SCNC: 13.6 MMOL/L (ref 5–15)
ANION GAP SERPL CALCULATED.3IONS-SCNC: 17.3 MMOL/L (ref 5–15)
AORTIC DIMENSIONLESS INDEX: 0.84 (DI)
ARTERIAL PATENCY WRIST A: POSITIVE
ARTERIAL PATENCY WRIST A: POSITIVE
AST SERPL-CCNC: 34 U/L (ref 1–32)
ATMOSPHERIC PRESS: ABNORMAL MM[HG]
ATMOSPHERIC PRESS: ABNORMAL MM[HG]
AV MEAN PRESS GRAD SYS DOP V1V2: 4 MMHG
AV VMAX SYS DOP: 129 CM/SEC
BASE EXCESS BLDA CALC-SCNC: 1.6 MMOL/L (ref 0–3)
BASE EXCESS BLDA CALC-SCNC: 8.1 MMOL/L (ref 0–3)
BASOPHILS # BLD AUTO: 0.01 10*3/MM3 (ref 0–0.2)
BASOPHILS NFR BLD AUTO: 0.1 % (ref 0–1.5)
BDY SITE: ABNORMAL
BDY SITE: ABNORMAL
BH CV ECHO MEAS - ACS: 1.6 CM
BH CV ECHO MEAS - AO MAX PG: 6.7 MMHG
BH CV ECHO MEAS - AO V2 VTI: 25.5 CM
BH CV ECHO MEAS - AVA(I,D): 1.9 CM2
BH CV ECHO MEAS - EDV(CUBED): 74.1 ML
BH CV ECHO MEAS - EDV(MOD-SP4): 81.5 ML
BH CV ECHO MEAS - EF(MOD-SP4): 71.7 %
BH CV ECHO MEAS - ESV(CUBED): 32.8 ML
BH CV ECHO MEAS - ESV(MOD-SP4): 23.1 ML
BH CV ECHO MEAS - FS: 23.8 %
BH CV ECHO MEAS - IVS/LVPW: 1 CM
BH CV ECHO MEAS - IVSD: 0.7 CM
BH CV ECHO MEAS - LA DIMENSION: 2.8 CM
BH CV ECHO MEAS - LAT PEAK E' VEL: 20.9 CM/SEC
BH CV ECHO MEAS - LV MASS(C)D: 85.1 GRAMS
BH CV ECHO MEAS - LV MAX PG: 4.4 MMHG
BH CV ECHO MEAS - LV MEAN PG: 2 MMHG
BH CV ECHO MEAS - LV V1 MAX: 105 CM/SEC
BH CV ECHO MEAS - LV V1 VTI: 21.4 CM
BH CV ECHO MEAS - LVIDD: 4.2 CM
BH CV ECHO MEAS - LVIDS: 3.2 CM
BH CV ECHO MEAS - LVOT AREA: 2.27 CM2
BH CV ECHO MEAS - LVOT DIAM: 1.7 CM
BH CV ECHO MEAS - LVPWD: 0.7 CM
BH CV ECHO MEAS - MED PEAK E' VEL: 12.3 CM/SEC
BH CV ECHO MEAS - MV A MAX VEL: 43.5 CM/SEC
BH CV ECHO MEAS - MV DEC SLOPE: 342.5 CM/SEC2
BH CV ECHO MEAS - MV DEC TIME: 0.21 SEC
BH CV ECHO MEAS - MV E MAX VEL: 84.9 CM/SEC
BH CV ECHO MEAS - MV E/A: 1.95
BH CV ECHO MEAS - MV MAX PG: 2.7 MMHG
BH CV ECHO MEAS - MV MEAN PG: 1 MMHG
BH CV ECHO MEAS - MV P1/2T: 84.1 MSEC
BH CV ECHO MEAS - MV V2 VTI: 29.2 CM
BH CV ECHO MEAS - MVA(P1/2T): 2.6 CM2
BH CV ECHO MEAS - MVA(VTI): 1.66 CM2
BH CV ECHO MEAS - PA V2 MAX: 108 CM/SEC
BH CV ECHO MEAS - PULM A REVS DUR: 0.07 SEC
BH CV ECHO MEAS - PULM A REVS VEL: 21 CM/SEC
BH CV ECHO MEAS - PULM DIAS VEL: 57.6 CM/SEC
BH CV ECHO MEAS - PULM S/D: 1.36
BH CV ECHO MEAS - PULM SYS VEL: 78.6 CM/SEC
BH CV ECHO MEAS - QP/QS: 0.59
BH CV ECHO MEAS - RV MAX PG: 2.16 MMHG
BH CV ECHO MEAS - RV V1 MAX: 73.4 CM/SEC
BH CV ECHO MEAS - RV V1 VTI: 18.7 CM
BH CV ECHO MEAS - RVDD: 2.6 CM
BH CV ECHO MEAS - RVOT DIAM: 1.4 CM
BH CV ECHO MEAS - SV(LVOT): 48.6 ML
BH CV ECHO MEAS - SV(MOD-SP4): 58.4 ML
BH CV ECHO MEAS - SV(RVOT): 28.8 ML
BH CV ECHO MEAS - TAPSE (>1.6): 2.6 CM
BH CV ECHO MEASUREMENTS AVERAGE E/E' RATIO: 5.11
BH CV XLRA - TDI S': 12.5 CM/SEC
BILIRUB SERPL-MCNC: 0.4 MG/DL (ref 0–1.2)
BLD GP AB SCN SERPL QL: NEGATIVE
BUN SERPL-MCNC: 14 MG/DL (ref 6–20)
BUN SERPL-MCNC: 19.9 MG/DL (ref 6–20)
BUN/CREAT SERPL: 18.7 (ref 7–25)
BUN/CREAT SERPL: 24.6 (ref 7–25)
CALCIUM SPEC-SCNC: 9.2 MG/DL (ref 8.6–10.5)
CALCIUM SPEC-SCNC: 9.3 MG/DL (ref 8.6–10.5)
CHLORIDE SERPL-SCNC: 101 MMOL/L (ref 98–107)
CHLORIDE SERPL-SCNC: 98 MMOL/L (ref 98–107)
CO2 BLDA-SCNC: 26.4 MMOL/L (ref 22–29)
CO2 BLDA-SCNC: 33.8 MMOL/L (ref 22–29)
CO2 SERPL-SCNC: 21.4 MMOL/L (ref 22–29)
CO2 SERPL-SCNC: 26.7 MMOL/L (ref 22–29)
CREAT SERPL-MCNC: 0.75 MG/DL (ref 0.57–1)
CREAT SERPL-MCNC: 0.81 MG/DL (ref 0.57–1)
DEPRECATED RDW RBC AUTO: 40.9 FL (ref 37–54)
DEVICE COMMENT: ABNORMAL
EGFRCR SERPLBLD CKD-EPI 2021: 106.7 ML/MIN/1.73
EGFRCR SERPLBLD CKD-EPI 2021: 117.1 ML/MIN/1.73
EOSINOPHIL # BLD AUTO: 0 10*3/MM3 (ref 0–0.4)
EOSINOPHIL NFR BLD AUTO: 0 % (ref 0.3–6.2)
ERYTHROCYTE [DISTWIDTH] IN BLOOD BY AUTOMATED COUNT: 13.4 % (ref 12.3–15.4)
GLOBULIN UR ELPH-MCNC: 2.9 GM/DL
GLUCOSE BLDC GLUCOMTR-MCNC: 137 MG/DL (ref 70–105)
GLUCOSE BLDC GLUCOMTR-MCNC: 145 MG/DL (ref 70–105)
GLUCOSE BLDC GLUCOMTR-MCNC: 190 MG/DL (ref 70–105)
GLUCOSE SERPL-MCNC: 133 MG/DL (ref 65–99)
GLUCOSE SERPL-MCNC: 181 MG/DL (ref 65–99)
HCO3 BLDA-SCNC: 25.3 MMOL/L (ref 21–28)
HCO3 BLDA-SCNC: 32.5 MMOL/L (ref 21–28)
HCT VFR BLD AUTO: 35.3 % (ref 34–46.6)
HEMODILUTION: NO
HEMODILUTION: NO
HGB BLD-MCNC: 11.5 G/DL (ref 12–15.9)
IMM GRANULOCYTES # BLD AUTO: 0.05 10*3/MM3 (ref 0–0.05)
IMM GRANULOCYTES NFR BLD AUTO: 0.4 % (ref 0–0.5)
INHALED O2 CONCENTRATION: 30 %
INHALED O2 CONCENTRATION: 30 %
LYMPHOCYTES # BLD AUTO: 0.38 10*3/MM3 (ref 0.7–3.1)
LYMPHOCYTES NFR BLD AUTO: 3.4 % (ref 19.6–45.3)
MAGNESIUM SERPL-MCNC: 1.9 MG/DL (ref 1.7–2.2)
MAGNESIUM SERPL-MCNC: 2.1 MG/DL (ref 1.7–2.2)
MCH RBC QN AUTO: 27.1 PG (ref 26.6–33)
MCHC RBC AUTO-ENTMCNC: 32.6 G/DL (ref 31.5–35.7)
MCV RBC AUTO: 83.3 FL (ref 79–97)
MODALITY: ABNORMAL
MODALITY: ABNORMAL
MONOCYTES # BLD AUTO: 0.08 10*3/MM3 (ref 0.1–0.9)
MONOCYTES NFR BLD AUTO: 0.7 % (ref 5–12)
NEUTROPHILS NFR BLD AUTO: 10.78 10*3/MM3 (ref 1.7–7)
NEUTROPHILS NFR BLD AUTO: 95.4 % (ref 42.7–76)
NRBC BLD AUTO-RTO: 0 /100 WBC (ref 0–0.2)
PCO2 BLDA: 36.1 MM HG (ref 35–48)
PCO2 BLDA: 43.6 MM HG (ref 35–48)
PEEP RESPIRATORY: 5 CM[H2O]
PEEP RESPIRATORY: 5 CM[H2O]
PH BLDA: 7.45 PH UNITS (ref 7.35–7.45)
PH BLDA: 7.48 PH UNITS (ref 7.35–7.45)
PHOSPHATE SERPL-MCNC: 3.4 MG/DL (ref 2.5–4.5)
PHOSPHATE SERPL-MCNC: 4 MG/DL (ref 2.5–4.5)
PLATELET # BLD AUTO: 277 10*3/MM3 (ref 140–450)
PMV BLD AUTO: 10.4 FL (ref 6–12)
PO2 BLD: 263 MM[HG] (ref 0–500)
PO2 BLD: 325 MM[HG] (ref 0–500)
PO2 BLDA: 79 MM HG (ref 83–108)
PO2 BLDA: 97.6 MM HG (ref 83–108)
POTASSIUM SERPL-SCNC: 3.5 MMOL/L (ref 3.5–5.2)
POTASSIUM SERPL-SCNC: 3.8 MMOL/L (ref 3.5–5.2)
PROT SERPL-MCNC: 7.1 G/DL (ref 6–8.5)
QT INTERVAL: 409 MS
QTC INTERVAL: 417 MS
RBC # BLD AUTO: 4.24 10*6/MM3 (ref 3.77–5.28)
RESPIRATORY RATE: 16
RH BLD: POSITIVE
RH BLD: POSITIVE
SAO2 % BLDCOA: 96.3 % (ref 94–98)
SAO2 % BLDCOA: 98 % (ref 94–98)
SINUS: 2.9 CM
SODIUM SERPL-SCNC: 136 MMOL/L (ref 136–145)
SODIUM SERPL-SCNC: 142 MMOL/L (ref 136–145)
T&S EXPIRATION DATE: NORMAL
VENTILATOR MODE: ABNORMAL
VENTILATOR MODE: AC
VT ON VENT VENT: 450 ML
WBC NRBC COR # BLD AUTO: 11.3 10*3/MM3 (ref 3.4–10.8)

## 2025-07-03 PROCEDURE — 83735 ASSAY OF MAGNESIUM: CPT | Performed by: NURSE PRACTITIONER

## 2025-07-03 PROCEDURE — 82803 BLOOD GASES ANY COMBINATION: CPT

## 2025-07-03 PROCEDURE — 83735 ASSAY OF MAGNESIUM: CPT

## 2025-07-03 PROCEDURE — 84100 ASSAY OF PHOSPHORUS: CPT

## 2025-07-03 PROCEDURE — 93306 TTE W/DOPPLER COMPLETE: CPT

## 2025-07-03 PROCEDURE — 86927 PLASMA FRESH FROZEN: CPT

## 2025-07-03 PROCEDURE — 25010000002 ENOXAPARIN PER 10 MG: Performed by: NURSE PRACTITIONER

## 2025-07-03 PROCEDURE — 86900 BLOOD TYPING SEROLOGIC ABO: CPT | Performed by: NURSE PRACTITIONER

## 2025-07-03 PROCEDURE — 86901 BLOOD TYPING SEROLOGIC RH(D): CPT | Performed by: NURSE PRACTITIONER

## 2025-07-03 PROCEDURE — 94799 UNLISTED PULMONARY SVC/PX: CPT

## 2025-07-03 PROCEDURE — 63710000001 INSULIN LISPRO (HUMAN) PER 5 UNITS: Performed by: NURSE PRACTITIONER

## 2025-07-03 PROCEDURE — 85025 COMPLETE CBC W/AUTO DIFF WBC: CPT

## 2025-07-03 PROCEDURE — 84100 ASSAY OF PHOSPHORUS: CPT | Performed by: NURSE PRACTITIONER

## 2025-07-03 PROCEDURE — 36600 WITHDRAWAL OF ARTERIAL BLOOD: CPT

## 2025-07-03 PROCEDURE — C9254 INJECTION, LACOSAMIDE: HCPCS | Performed by: PSYCHIATRY & NEUROLOGY

## 2025-07-03 PROCEDURE — 36430 TRANSFUSION BLD/BLD COMPNT: CPT

## 2025-07-03 PROCEDURE — 80053 COMPREHEN METABOLIC PANEL: CPT

## 2025-07-03 PROCEDURE — 86900 BLOOD TYPING SEROLOGIC ABO: CPT

## 2025-07-03 PROCEDURE — 94003 VENT MGMT INPAT SUBQ DAY: CPT

## 2025-07-03 PROCEDURE — 94761 N-INVAS EAR/PLS OXIMETRY MLT: CPT

## 2025-07-03 PROCEDURE — 25010000002 METHYLPREDNISOLONE PER 125 MG: Performed by: INTERNAL MEDICINE

## 2025-07-03 PROCEDURE — 25010000002 LACOSAMIDE 200 MG/20ML SOLUTION: Performed by: PSYCHIATRY & NEUROLOGY

## 2025-07-03 PROCEDURE — P9059 PLASMA, FRZ BETWEEN 8-24HOUR: HCPCS

## 2025-07-03 PROCEDURE — 93306 TTE W/DOPPLER COMPLETE: CPT | Performed by: INTERNAL MEDICINE

## 2025-07-03 PROCEDURE — 93010 ELECTROCARDIOGRAM REPORT: CPT | Performed by: INTERNAL MEDICINE

## 2025-07-03 PROCEDURE — 93005 ELECTROCARDIOGRAM TRACING: CPT | Performed by: INTERNAL MEDICINE

## 2025-07-03 PROCEDURE — 82948 REAGENT STRIP/BLOOD GLUCOSE: CPT

## 2025-07-03 PROCEDURE — 86850 RBC ANTIBODY SCREEN: CPT | Performed by: NURSE PRACTITIONER

## 2025-07-03 PROCEDURE — 25010000002 LEVETRIRACETAM PER 10 MG: Performed by: PSYCHIATRY & NEUROLOGY

## 2025-07-03 PROCEDURE — 25010000002 BUMETANIDE PER 0.5 MG: Performed by: INTERNAL MEDICINE

## 2025-07-03 PROCEDURE — 86901 BLOOD TYPING SEROLOGIC RH(D): CPT

## 2025-07-03 PROCEDURE — 25010000002 ACETAZOLAMIDE PER 500 MG: Performed by: NURSE PRACTITIONER

## 2025-07-03 RX ORDER — FAMOTIDINE 20 MG/1
20 TABLET, FILM COATED ORAL
Status: DISCONTINUED | OUTPATIENT
Start: 2025-07-03 | End: 2025-07-05

## 2025-07-03 RX ORDER — BISACODYL 10 MG
10 SUPPOSITORY, RECTAL RECTAL DAILY PRN
Status: DISCONTINUED | OUTPATIENT
Start: 2025-07-03 | End: 2025-07-06

## 2025-07-03 RX ORDER — LACTULOSE 10 G/15ML
30 SOLUTION ORAL ONCE
Status: COMPLETED | OUTPATIENT
Start: 2025-07-03 | End: 2025-07-03

## 2025-07-03 RX ORDER — NICOTINE POLACRILEX 4 MG
15 LOZENGE BUCCAL
Status: DISCONTINUED | OUTPATIENT
Start: 2025-07-03 | End: 2025-07-08 | Stop reason: HOSPADM

## 2025-07-03 RX ORDER — POLYETHYLENE GLYCOL 3350 17 G/17G
17 POWDER, FOR SOLUTION ORAL DAILY
Status: DISCONTINUED | OUTPATIENT
Start: 2025-07-03 | End: 2025-07-06

## 2025-07-03 RX ORDER — INSULIN LISPRO 100 [IU]/ML
2-7 INJECTION, SOLUTION INTRAVENOUS; SUBCUTANEOUS EVERY 6 HOURS SCHEDULED
Status: DISCONTINUED | OUTPATIENT
Start: 2025-07-03 | End: 2025-07-08 | Stop reason: HOSPADM

## 2025-07-03 RX ORDER — IBUPROFEN 600 MG/1
1 TABLET ORAL
Status: DISCONTINUED | OUTPATIENT
Start: 2025-07-03 | End: 2025-07-08 | Stop reason: HOSPADM

## 2025-07-03 RX ORDER — POTASSIUM CHLORIDE 1.5 G/1.58G
40 POWDER, FOR SOLUTION ORAL EVERY 4 HOURS
Status: COMPLETED | OUTPATIENT
Start: 2025-07-03 | End: 2025-07-03

## 2025-07-03 RX ORDER — AMOXICILLIN 250 MG
2 CAPSULE ORAL 2 TIMES DAILY
Status: DISCONTINUED | OUTPATIENT
Start: 2025-07-03 | End: 2025-07-06

## 2025-07-03 RX ORDER — DEXTROSE MONOHYDRATE 25 G/50ML
25 INJECTION, SOLUTION INTRAVENOUS
Status: DISCONTINUED | OUTPATIENT
Start: 2025-07-03 | End: 2025-07-08 | Stop reason: HOSPADM

## 2025-07-03 RX ORDER — ACETAZOLAMIDE SODIUM 500 MG/5ML
500 INJECTION, POWDER, LYOPHILIZED, FOR SOLUTION INTRAVENOUS ONCE
Status: COMPLETED | OUTPATIENT
Start: 2025-07-03 | End: 2025-07-03

## 2025-07-03 RX ORDER — BISACODYL 5 MG/1
5 TABLET, DELAYED RELEASE ORAL DAILY PRN
Status: DISCONTINUED | OUTPATIENT
Start: 2025-07-03 | End: 2025-07-06

## 2025-07-03 RX ADMIN — METHYLPREDNISOLONE SODIUM SUCCINATE 80 MG: 125 INJECTION, POWDER, FOR SOLUTION INTRAMUSCULAR; INTRAVENOUS at 21:53

## 2025-07-03 RX ADMIN — MUPIROCIN 1 APPLICATION: 20 OINTMENT TOPICAL at 08:03

## 2025-07-03 RX ADMIN — LACOSAMIDE 50 MG: 10 INJECTION INTRAVENOUS at 08:02

## 2025-07-03 RX ADMIN — METHYLPREDNISOLONE SODIUM SUCCINATE 80 MG: 125 INJECTION, POWDER, FOR SOLUTION INTRAMUSCULAR; INTRAVENOUS at 05:41

## 2025-07-03 RX ADMIN — ENOXAPARIN SODIUM 40 MG: 100 INJECTION SUBCUTANEOUS at 16:24

## 2025-07-03 RX ADMIN — LACOSAMIDE 50 MG: 10 INJECTION INTRAVENOUS at 20:26

## 2025-07-03 RX ADMIN — CETIRIZINE HYDROCHLORIDE 10 MG: 10 TABLET, FILM COATED ORAL at 08:02

## 2025-07-03 RX ADMIN — LACTULOSE 30 G: 20 SOLUTION ORAL at 09:59

## 2025-07-03 RX ADMIN — BUMETANIDE 1 MG: 0.25 INJECTION INTRAMUSCULAR; INTRAVENOUS at 05:41

## 2025-07-03 RX ADMIN — BUMETANIDE 2 MG/HR: 0.25 INJECTION INTRAMUSCULAR; INTRAVENOUS at 21:23

## 2025-07-03 RX ADMIN — Medication 10 ML: at 09:48

## 2025-07-03 RX ADMIN — POTASSIUM CHLORIDE 40 MEQ: 1.5 POWDER, FOR SOLUTION ORAL at 23:06

## 2025-07-03 RX ADMIN — ACETAZOLAMIDE SODIUM 500 MG: 500 INJECTION, POWDER, LYOPHILIZED, FOR SOLUTION INTRAVENOUS at 19:38

## 2025-07-03 RX ADMIN — BUMETANIDE 2 MG/HR: 0.25 INJECTION INTRAMUSCULAR; INTRAVENOUS at 09:57

## 2025-07-03 RX ADMIN — FAMOTIDINE 20 MG: 20 TABLET, FILM COATED ORAL at 17:28

## 2025-07-03 RX ADMIN — BUDESONIDE 0.5 MG: 0.5 SUSPENSION RESPIRATORY (INHALATION) at 06:50

## 2025-07-03 RX ADMIN — BUDESONIDE 0.5 MG: 0.5 SUSPENSION RESPIRATORY (INHALATION) at 18:40

## 2025-07-03 RX ADMIN — POLYETHYLENE GLYCOL 3350 17 G: 17 POWDER, FOR SOLUTION ORAL at 10:00

## 2025-07-03 RX ADMIN — VALPROATE SODIUM 500 MG: 100 INJECTION, SOLUTION INTRAVENOUS at 08:02

## 2025-07-03 RX ADMIN — LANSOPRAZOLE 30 MG: 30 TABLET, ORALLY DISINTEGRATING ORAL at 05:41

## 2025-07-03 RX ADMIN — Medication 10 ML: at 20:26

## 2025-07-03 RX ADMIN — INSULIN LISPRO 2 UNITS: 100 INJECTION, SOLUTION INTRAVENOUS; SUBCUTANEOUS at 11:49

## 2025-07-03 RX ADMIN — METHYLPREDNISOLONE SODIUM SUCCINATE 80 MG: 125 INJECTION, POWDER, FOR SOLUTION INTRAMUSCULAR; INTRAVENOUS at 14:52

## 2025-07-03 RX ADMIN — SENNOSIDES AND DOCUSATE SODIUM 2 TABLET: 50; 8.6 TABLET ORAL at 20:26

## 2025-07-03 RX ADMIN — SENNOSIDES AND DOCUSATE SODIUM 2 TABLET: 50; 8.6 TABLET ORAL at 10:00

## 2025-07-03 RX ADMIN — LEVETIRACETAM 500 MG: 500 INJECTION, SOLUTION INTRAVENOUS at 08:02

## 2025-07-03 RX ADMIN — VALPROATE SODIUM 500 MG: 100 INJECTION, SOLUTION INTRAVENOUS at 20:26

## 2025-07-03 RX ADMIN — MUPIROCIN 1 APPLICATION: 20 OINTMENT TOPICAL at 20:25

## 2025-07-03 RX ADMIN — POTASSIUM CHLORIDE 40 MEQ: 1.5 POWDER, FOR SOLUTION ORAL at 19:37

## 2025-07-03 RX ADMIN — LEVETIRACETAM 500 MG: 500 INJECTION, SOLUTION INTRAVENOUS at 20:25

## 2025-07-03 RX ADMIN — DEXMEDETOMIDINE HYDROCHLORIDE 0.5 MCG/KG/HR: 4 INJECTION, SOLUTION INTRAVENOUS at 04:56

## 2025-07-03 NOTE — PROGRESS NOTES
Critical Care Progress Note     Rand Asencio : 2005 MRN:[id number] LOS:3  Rm: 2310/1     Principal Problem: Intentional overdose     Reason for follow up: All the medical problems listed below    Summary      Rand Asencio is a 20 y.o. old female patient with PMH of mood disorder with anxiety and depression, asthma and arthritis who presented to the ED on 2025 for evaluation after an intentional suicide attempt 30 minutes prior to arrival.  The following information has been obtained primarily from review of documentation and collaboration with other providers as the patient is postictal and encephalopathic at the time of examination in the ED.  The patient reported in the ED that she and her partner had gotten into an argument after which she was very upset and ingested her prescribed medications of Lamictal, fluoxetine, and Vraylar with the intention of suicide.  The patient admitted to the ED provider that she has a history of suicide attempts with cutting in the past.  The patient also reported generalized pain, chest pain, and nausea and vomiting.  Poison control was contacted by ED staff and the recommendation was an 8-hour observation at the hospital.  During the patient's time in the ED she had 2 episodes of seizure-like activity which were aborted by IV Ativan.  The patient was admitted and neurology was consulted.  She had at least 2 more witnessed seizure-like activity episodes therefore Keppra and Vimpat were initiated.  After she had several more witnessed episodes of seizure-like activity it was determined that it would benefit her to transfer to the ICU for further medical management and due to the risk of need of airway protection if she develops status epilepticus.  At the time of arrival to the ICU the patient was awake though with a blunted affect.  She was able to answer some questions of orientation however she has no insight into the current situation.  Her airway is self  protected and she is in no acute distress.     Daily progress:  7/1: The patient is intubated and heavily sedated. She required intubation for airway protection yesterday after multiple episodes of seizure-like activity. Not all of her episodes appeared to be true seizures however she did desaturate with some of the events. She also had episodes of decreased LOC, either feigned or due to seizure/post-ictal. No seizures overnight.  Due to the Lamictal overdose, the patient is at risk for Faust-Hilario syndrome.  She currently does not have a rash.  Oral examination is limited due to the presence of oral ET tube however the hard palate seems to be free of any kind of desquamating.  She remains afebrile.  Will start tube feeds as it is unclear how long the patient will need to be intubated.  The half-life of the medications she took have variable rates     7/2: Continues with facial swelling. No current rash visualized. Replace garcia catheter as patient ingested anticholinergic medications preventing her for spontaneous urination, please discuss with MD on this patient before following protocol due to more specific nature or it's placement; required straight cath x 3 overnight, without any spontaneous urination. Still with swelling of face and neck.  Trial of Bumex this morning. More awake, but not following commands; preventing appropriateness for extubation.  No seizure like activity visualized. Tube feeding planned to be initiated today. Labs reviewed and within appropriate parameters.  Reviewed home medications, restarting appropriate medications.     ACP: ACP documentation on file    Patient is on Hospital Day: 4.    Significant Events / Subjective     07/03/25 : Facial swelling slightly improved after Bumex.  Looked at patient's weights over past 15 days, considerable weight gain, likely edema.  Starting on Bumex gtt. Checking echocardiogram.  Concern for possible angioedema, though unlikely, giving 2 units of  FFP and adding famotidine to previously started steroids. Avoiding benadryl due to added cholinergic effects.  Plan to leave patient's sedation off, and attempt to extubate if patient becomes better at command following.    Assessment / Plan     Acute respiratory failure with hypoxia / on mechanically assisted ventilation  Facial swelling  Likely due to seizure/post-ictal & possible malingering. Intubated for airway protection and correction of hypoxia on 6/29.  Ventilator settings noted and adjusted as needed.   Close monitoring of ABG.   Minimize sedation/analgesia to keep RASS of 0 to -1. Switching from Propofol/Versed/Fentanyl to Precedex and low-dose Fentanyl on 7/2.  Stopping all sedation as of 7/3.  Mentation and facial swelling preventing appropriateness to extubation.  Steroids added on 7/2. Concern for angioedema, giving 2 units of FFP. Added famotidine on 7/3.    Weight gain, recent  With the patient's swelling, reviewed patient's weight on 6/17/2025, at this facility, patient weighed 69 kg, on admission patient's weight noted at 78.6, and furthermore as of 7/3 was 78.9 kg.  Responded well to Bumex on 7/2, with patient's weight gain starting patient on a Bumex drip.  Monitor I's/O.  Will recheck labs later in the day.  Check echocardiogram.    Acute toxic encephalopathy  Polysubstance ingestion  New onset seizure activity, likely attributed to medication ingestion lowering seizure threshold  CT head negative for any acute intracranial findings  Tox screen positive for THC  Negative HCG.  Neurology following  Keppra loading dose and then every 12 hours  Continue Keppra, Depacon, and Vimpat.  EEG 6/30/2025: Nothing suggesting clear-cut epileptiform activity.  Supportive care for now.  Intubated for airway protection & hypoxia on 6/29.  Replace garcia catheter as patient ingested anticholinergic medications preventing her for spontaneous urination, please discuss with MD on this patient before following  protocol due to more specific nature or it's placement.    Mood disorder with anxiety and depression  Intentional polysubstance ingestion  History of self-destructive behaviors including cutting  Poison control previously consulted, recommendations received and are in place as appropriate.  Consult behavioral health for behavioral medication management when appropriate, currently intubated and sedated     History of asthma  Continue budesonide per home medications.  DuoNebs as needed        Disposition: ICU, intubated    Code status:   Code Status (Patient has no pulse and is not breathing): CPR (Attempt to Resuscitate)  Medical Interventions (Patient has pulse or is breathing): Full Support       Nutrition:   NPO Diet NPO Type: Strict NPO   Tube Feeding: Formula: Peptamen AF (Vital AF 1.2); Feeding Type: Continuous; Start at: Other; Other: 65; Then Advance By: Do Not Advance; Water Flush: 10 mL; Every: 1 hour; Water Bolus: None     VTE Prophylaxis:  Pharmacologic & mechanical VTE prophylaxis orders are present.    Objective / Physical Exam     Vital signs:  Temp: 98.5 °F (36.9 °C)  BP: 105/66  Heart Rate: 68  Resp: 18  SpO2: 98 %  Weight: 78.9 kg (174 lb)    Admission Weight: Weight: 79.8 kg (176 lb)  Current Weight: Weight: 78.9 kg (174 lb)    Input/Output in last 24 hours:    Intake/Output Summary (Last 24 hours) at 7/3/2025 1103  Last data filed at 7/3/2025 0800  Gross per 24 hour   Intake 1890 ml   Output 2950 ml   Net -1060 ml      Net IO Since Admission: 1,038 mL [07/03/25 1103]     Physical Exam  Vitals and nursing note reviewed.   Constitutional:       General: She is not in acute distress.     Appearance: She is obese. She is ill-appearing. She is not toxic-appearing.      Comments: Intubated and sedated   HENT:      Head: Normocephalic and atraumatic.      Right Ear: External ear normal.      Left Ear: External ear normal.      Nose:      Comments: Left NGT in place.     Mouth/Throat:      Mouth: Mucous  membranes are moist.      Pharynx: Oropharynx is clear.      Comments: Oral ET tube  Tongue no longer swollen, no obvious palatal rash noted.  Eyes:      Comments: Eyelids closed.   Neck:      Comments: Lower face and neck swollen and edematous, improved from yesterday.  Cardiovascular:      Rate and Rhythm: Normal rate and regular rhythm.      Pulses: Normal pulses.      Heart sounds: Normal heart sounds, S1 normal and S2 normal. No murmur heard.     Comments: Sinus rhythm  Pulmonary:      Breath sounds: Normal breath sounds. No wheezing or rhonchi.      Comments: Mechanically ventilated  Abdominal:      General: There is no distension.      Palpations: Abdomen is soft.      Tenderness: There is no abdominal tenderness.      Comments: Morbid body habitus  Hypoactive bowel sounds   Musculoskeletal:         General: Swelling present.      Cervical back: Neck supple. No rigidity.      Right lower leg: No edema.      Left lower leg: No edema.   Skin:     General: Skin is warm and dry.      Findings: No rash.   Neurological:      General: No focal deficit present.      Comments: Intubated and sedated, awakens, but not following commands, spontaneously moving extremities.   Psychiatric:      Comments: Intubated, sedated.          Radiology and Labs     Results from last 7 days   Lab Units 07/03/25  0352 07/02/25  0354 07/01/25  0320 06/30/25  0844 06/30/25  0138   WBC 10*3/mm3 11.30* 6.81 7.63 11.38* 19.55*   HEMOGLOBIN g/dL 11.5* 10.1* 10.2* 11.5* 11.9*   HEMATOCRIT % 35.3 31.5* 31.9* 35.4 36.3   PLATELETS 10*3/mm3 277 235 250 303 320           Results from last 7 days   Lab Units 07/03/25  0352 07/02/25  0354 07/01/25  1524 07/01/25  0320 06/30/25  2128 06/30/25  0844 06/30/25  0138   SODIUM mmol/L 136 139  --  138  --  139 138   POTASSIUM mmol/L 3.8 3.8 4.2 3.3* 4.0 3.5 3.6   CHLORIDE mmol/L 101 108*  --  106  --  106 104   CO2 mmol/L 21.4* 21.0*  --  20.8*  --  17.6* 13.6*   ANION GAP mmol/L 13.6 10.0  --  11.2  --   15.4* 20.4*   BUN mg/dL 14.0 8.0  --  5.6*  --  7.3 9.6   CREATININE mg/dL 0.75 0.68  --  0.75  --  0.77 0.78   GLUCOSE mg/dL 181* 90  --  82  --  94 115*   PHOSPHORUS mg/dL 4.0 4.1  --  2.9  --  3.1  --    MAGNESIUM mg/dL 1.9 2.2  --  2.1  --  2.0  --    ALT (SGPT) U/L 19 12  --  10  --  17 21   AST (SGOT) U/L 34* 26  --  23  --  31 34*   ALK PHOS U/L 56 43  --  46  --  49 54      Results from last 7 days   Lab Units 07/03/25  0352 07/02/25  0354 07/01/25  0320 06/30/25  0844 06/30/25  0138   ALT (SGPT) U/L 19 12 10 17 21   AST (SGOT) U/L 34* 26 23 31 34*   ALK PHOS U/L 56 43 46 49 54     Results from last 7 days   Lab Units 07/03/25  0333 07/02/25  0343 07/01/25  0249 06/30/25  1351   PH, ARTERIAL pH units 7.454* 7.408 7.429 7.392   PCO2, ARTERIAL mm Hg 36.1 40.2 37.4 38.7   PO2 ART mm Hg 97.6 135.4* 206.8* 82.4*   O2 SATURATION ART % 98.0 99.1* 99.8* 96.0   FIO2 % 30 30 40 50   HCO3 ART mmol/L 25.3 25.4 24.8 23.5   BASE EXCESS ART mmol/L 1.6 0.6 0.5 -1.2*       No radiology results for the last day      Current medications     Scheduled Meds:   budesonide, 0.5 mg, Nebulization, BID  [Held by provider] Cariprazine HCl, 1.5 mg, Oral, Daily  cetirizine, 10 mg, Nasogastric, Daily  enoxaparin sodium, 40 mg, Subcutaneous, Q24H  [Held by provider] fluvoxaMINE, 100 mg, Oral, Nightly  insulin lispro, 2-7 Units, Subcutaneous, Q6H  Lacosamide, 50 mg, Intravenous, Q12H  [Held by provider] lamoTRIgine, 100 mg, Oral, Q12H  lansoprazole, 30 mg, Nasogastric, Q AM  levETIRAcetam, 500 mg, Intravenous, Q12H  methylPREDNISolone sodium succinate, 80 mg, Intravenous, Q8H  mupirocin, 1 Application, Each Nare, BID  senna-docusate sodium, 2 tablet, Nasogastric, BID   And  polyethylene glycol, 17 g, Nasogastric, Daily  sodium chloride, 10 mL, Intravenous, Q12H  valproate sodium, 500 mg, Intravenous, Q12H        Continuous Infusions:   bumetanide, 2 mg/hr, Last Rate: 2 mg/hr (07/03/25 0957)  dexmedetomidine, 0.2-1.5 mcg/kg/hr, Last Rate:  Stopped (07/03/25 0930)      Total critical care time: Approximately 31 minutes    Due to a high probability of clinically significant, life threatening deterioration, the patient required my highest level of preparedness to intervene emergently and I personally spent this critical care time directly and personally managing the patient. This critical care time included obtaining a history; examining the patient; pulse oximetry; ordering and review of studies; arranging urgent treatment with development of a management plan; evaluation of patient's response to treatment; frequent reassessment; and, discussions with other providers.    This critical care time was performed to assess and manage the high probability of imminent, life-threatening deterioration that could result in multi-organ failure. It was exclusive of separately billable procedures and treating other patients and teaching time.      MARIAELENA Harper   Critical Care  07/03/25   11:03 EDT

## 2025-07-03 NOTE — CASE MANAGEMENT/SOCIAL WORK
Continued Stay Note   Rudi     Patient Name: Rand Asencio  MRN: [id number]  Today's Date: 7/3/2025    Admit Date: 6/30/2025    Plan: Plan to return home with boyfriend and his mother Steff.   Discharge Plan       Row Name 07/03/25 0911       Plan    Plan Plan to return home with boyfriend and his mother Steff.    Plan Comments DC Barriers: vent 30/5, NPO NG TF, IV Keppra/Vimpat, FC, A-line, PICC, restraints, Psych/Neuro following.                 Expected Discharge Date and Time       Expected Discharge Date Expected Discharge Time    Jul 8, 2025               LUIS ENRIQUE Degroot RN  ICU/CVU   O: [phone]  C: [phone]  [email or web address]

## 2025-07-03 NOTE — PROGRESS NOTES
Sedation held patient is in the process of  To be woken up and extubated    Nothing suggesting further seizures continue present supportive care    With the team    Discussed with the patient's boyfriend's mother

## 2025-07-03 NOTE — PLAN OF CARE
Problem: Adult Inpatient Plan of Care  Goal: Plan of Care Review  7/3/2025 1827 by Amelia Grijalva RN  Outcome: Progressing  Flowsheets (Taken 7/3/2025 1827)  Outcome Evaluation: pt remains intubated, dex gtt turned off at 0900, bumex gtt running. pt remains in soft wrist restraints, as well as the vest. pt has been on CPAP for majority of shift, and is awake but not following commands. ceribell placed at 1730, with no seizure burden. Call light within reach, bed in low position with bed alarm on. Sitter remains at bedside.  7/3/2025 1827 by Amelia Grijalva RN  Outcome: Progressing  Flowsheets (Taken 7/3/2025 1827)  Outcome Evaluation: pt remains intubated, dex gtt turned off at 0900, bumex gtt running. pt remains in soft wrist restraints, as well as the vest. pt has been on CPAP for majority of shift, and is awake but not following commands. ceribell placed at 1730, with no seizure burden. Call light within reach, bed in low position with bed alarm on. Sitter remains at bedside.  Goal: Patient-Specific Goal (Individualized)  7/3/2025 1827 by Amelia Grijalva RN  Outcome: Progressing  7/3/2025 1827 by Amelia Grijalva RN  Outcome: Progressing  Goal: Absence of Hospital-Acquired Illness or Injury  7/3/2025 1827 by Amelia Grijalva RN  Outcome: Progressing  7/3/2025 1827 by Amelia Grijalva RN  Outcome: Progressing  Intervention: Identify and Manage Fall Risk  Recent Flowsheet Documentation  Taken 7/3/2025 1700 by Amelia Grijalva RN  Safety Promotion/Fall Prevention: safety round/check completed  Taken 7/3/2025 1600 by Amelia Grijalva RN  Safety Promotion/Fall Prevention:   safety round/check completed   room organization consistent   nonskid shoes/slippers when out of bed   elopement precautions   clutter free environment maintained   assistive device/personal items within reach   activity supervised   fall prevention program maintained  Taken 7/3/2025 1500 by Amelia Grijalva RN  Safety Promotion/Fall  Prevention: safety round/check completed  Taken 7/3/2025 1300 by Amelia Grijalva RN  Safety Promotion/Fall Prevention: safety round/check completed  Taken 7/3/2025 1200 by Amelia Grijalva RN  Safety Promotion/Fall Prevention: safety round/check completed  Taken 7/3/2025 1100 by Amelia Grijalva RN  Safety Promotion/Fall Prevention: safety round/check completed  Taken 7/3/2025 1000 by Amelia Grijalva RN  Safety Promotion/Fall Prevention:   safety round/check completed   room organization consistent   nonskid shoes/slippers when out of bed   fall prevention program maintained   clutter free environment maintained   assistive device/personal items within reach   activity supervised  Taken 7/3/2025 0900 by Amelia Grijalva RN  Safety Promotion/Fall Prevention:   safety round/check completed   room organization consistent   nonskid shoes/slippers when out of bed   fall prevention program maintained   elopement precautions   clutter free environment maintained   assistive device/personal items within reach   activity supervised  Taken 7/3/2025 0800 by Amelia Grijalva RN  Safety Promotion/Fall Prevention:   safety round/check completed   room organization consistent   nonskid shoes/slippers when out of bed   fall prevention program maintained   elopement precautions   clutter free environment maintained   assistive device/personal items within reach   activity supervised  Taken 7/3/2025 0700 by Amelia Grijalva RN  Safety Promotion/Fall Prevention:   safety round/check completed   room organization consistent   fall prevention program maintained   elopement precautions   clutter free environment maintained   assistive device/personal items within reach   activity supervised  Intervention: Prevent Skin Injury  Recent Flowsheet Documentation  Taken 7/3/2025 1800 by Amelia Grijalva RN  Body Position:   turned   right  Taken 7/3/2025 1600 by Amelia Grijalva RN  Body Position:   turned   left  Skin Protection:  pulse oximeter probe site changed  Taken 7/3/2025 1400 by Amelia Grijalva RN  Body Position:   turned   right  Taken 7/3/2025 1200 by Amelia Grijalva RN  Body Position:   turned   supine  Skin Protection: incontinence pads utilized  Taken 7/3/2025 0800 by Amelia Grijalva RN  Body Position:   turned   left  Skin Protection:   incontinence pads utilized   pulse oximeter probe site changed  Taken 7/3/2025 0700 by Amelia Grijalva RN  Body Position:   turned   right  Intervention: Prevent and Manage VTE (Venous Thromboembolism) Risk  Recent Flowsheet Documentation  Taken 7/3/2025 1600 by Amelia Grijalva RN  VTE Prevention/Management:   bilateral   SCDs (sequential compression devices) on  Taken 7/3/2025 1200 by Amelia Grijalva RN  VTE Prevention/Management:   bilateral   SCDs (sequential compression devices) on  Taken 7/3/2025 0800 by Amelia Grijalva RN  VTE Prevention/Management:   bilateral   SCDs (sequential compression devices) on  Intervention: Prevent Infection  Recent Flowsheet Documentation  Taken 7/3/2025 1600 by Amelia Grijalva RN  Infection Prevention:   single patient room provided   rest/sleep promoted   hand hygiene promoted  Taken 7/3/2025 1200 by Amelia Grijalva RN  Infection Prevention:   single patient room provided   rest/sleep promoted   hand hygiene promoted  Taken 7/3/2025 0800 by Amelia Grijalva RN  Infection Prevention:   single patient room provided   rest/sleep promoted   hand hygiene promoted   equipment surfaces disinfected  Goal: Optimal Comfort and Wellbeing  7/3/2025 1827 by Amelia Grijalva RN  Outcome: Progressing  7/3/2025 1827 by Amelia Grijalva RN  Outcome: Progressing  Intervention: Monitor Pain and Promote Comfort  Recent Flowsheet Documentation  Taken 7/3/2025 1600 by Amelia Grijalva RN  Pain Management Interventions:   pillow support provided   position adjusted   care clustered  Taken 7/3/2025 0800 by Amelia Grijalva RN  Pain Management Interventions:    care clustered   pillow support provided   position adjusted  Intervention: Provide Person-Centered Care  Recent Flowsheet Documentation  Taken 7/3/2025 1600 by Amelia Grijalva RN  Trust Relationship/Rapport:   care explained   choices provided   emotional support provided   questions answered   questions encouraged   reassurance provided   thoughts/feelings acknowledged  Goal: Readiness for Transition of Care  7/3/2025 1827 by Amelia Grijalva RN  Outcome: Progressing  7/3/2025 1827 by Amelia Grijalva RN  Outcome: Progressing     Problem: Suicide Risk  Goal: Absence of Self-Harm  7/3/2025 1827 by Amelia Grijalva RN  Outcome: Progressing  7/3/2025 1827 by Amelia Grijalva RN  Outcome: Progressing  Intervention: Assess Risk to Self and Maintain Safety  Recent Flowsheet Documentation  Taken 7/3/2025 1600 by Amelia Grijalva RN  Behavior Management: boundaries reinforced  Enhanced Safety Measures:   bed alarm set   room near unit station   family to remain at bedside  Self-Harm Prevention: observed one-to-one  Taken 7/3/2025 1200 by Amelia Grijalva RN  Enhanced Safety Measures:   bed alarm set   family to remain at bedside   room near unit station  Taken 7/3/2025 1000 by Amelia Grijalva RN  Enhanced Safety Measures:   bed alarm set   room near unit station  Taken 7/3/2025 0900 by Amelia Grijalva RN  Enhanced Safety Measures:   bed alarm set   room near unit station  Taken 7/3/2025 0800 by Amelia Grijalva RN  Enhanced Safety Measures:   bed alarm set   family to remain at bedside   room near unit station  Self-Harm Prevention: observed one-to-one  Taken 7/3/2025 0700 by Amelia Grijalva RN  Enhanced Safety Measures:   bed alarm set   room near unit station    at bedside  Intervention: Promote Psychosocial Wellbeing  Recent Flowsheet Documentation  Taken 7/3/2025 1600 by Amelia Grijalva RN  Supportive Measures:   relaxation techniques promoted   self-care encouraged  Family/Support  System Care:   involvement promoted   presence promoted   self-care encouraged   support provided  Sleep/Rest Enhancement:   awakenings minimized   family presence promoted   natural light exposure provided   noise level reduced   regular sleep/rest pattern promoted   room darkened  Taken 7/3/2025 0800 by Amelia Grijalva RN  Supportive Measures:   relaxation techniques promoted   positive reinforcement provided  Intervention: Establish Safety Plan and Continuity of Care  Recent Flowsheet Documentation  Taken 7/3/2025 1600 by Amelia Grijalva RN  Safe Transition Promotion: protective factors promoted  Taken 7/3/2025 0800 by Amelia Grijalva RN  Safe Transition Promotion: protective factors promoted     Problem: Mechanical Ventilation Invasive  Goal: Effective Communication  7/3/2025 1827 by Amelia Grijalva RN  Outcome: Progressing  7/3/2025 1827 by Amelia Grijalva RN  Outcome: Progressing  Intervention: Ensure Effective Communication  Recent Flowsheet Documentation  Taken 7/3/2025 1800 by Amelia Grijalva RN  Diversional Activities: television  Taken 7/3/2025 1637 by Amelia Grijalva RN  Diversional Activities: television  Taken 7/3/2025 1600 by Amelia Grijalva RN  Trust Relationship/Rapport:   care explained   choices provided   emotional support provided   questions answered   questions encouraged   reassurance provided   thoughts/feelings acknowledged  Diversional Activities: television  Family/Support System Care:   involvement promoted   presence promoted   self-care encouraged   support provided  Communication Enhancement Strategies:   call light answered in person   repetition utilized  Taken 7/3/2025 1400 by Amelia Grijalva RN  Diversional Activities: television  Taken 7/3/2025 1200 by Amelia Grijalva RN  Diversional Activities: television  Taken 7/3/2025 1000 by Amelia Grijalva RN  Diversional Activities: television  Taken 7/3/2025 0800 by Amelia Grijalva RN  Diversional Activities:  television  Goal: Optimal Device Function  7/3/2025 1827 by Amelia Grijalva RN  Outcome: Progressing  7/3/2025 1827 by Amelia Grijalva RN  Outcome: Progressing  Intervention: Optimize Device Care and Function  Recent Flowsheet Documentation  Taken 7/3/2025 1800 by Amelia Grijalva RN  Oral Care:   lip/mouth moisturizer applied   suction provided   teeth brushed - suction toothbrush   tongue brushed  Taken 7/3/2025 1600 by Amelia Grijalva RN  Airway/Ventilation Management:   airway patency maintained   calming measures promoted   humidification applied   oxygen therapy provided   position adjusted  Oral Care:   dental appliance cleaned   oral rinse provided   suction provided   teeth brushed - suction toothbrush   tongue brushed  Airway Safety Measures:   suction at bedside   oxygen flowmeter at bedside   manual resuscitator/mask at bedside   mask valve resuscitator at bedside  Taken 7/3/2025 1200 by Amelia Grijalva RN  Oral Care:   teeth brushed - suction toothbrush   suction provided   mouth wash rinse   lip/mouth moisturizer applied   tongue brushed  Airway Safety Measures:   suction at bedside   oxygen flowmeter at bedside   manual resuscitator/mask at bedside   mask valve resuscitator at bedside  Taken 7/3/2025 0800 by Amelia Grijalva RN  Airway/Ventilation Management:   airway patency maintained   calming measures promoted   humidification applied   oxygen therapy provided   position adjusted  Oral Care:   lip/mouth moisturizer applied   suction provided   swabbed with antiseptic solution   teeth brushed - suction toothbrush   tongue brushed  Airway Safety Measures:   suction at bedside   oxygen flowmeter at bedside   manual resuscitator/mask at bedside   mask valve resuscitator at bedside  Goal: Mechanical Ventilation Liberation  7/3/2025 1827 by Amelia Grijalva RN  Outcome: Progressing  7/3/2025 1827 by Amelia Grijalva RN  Outcome: Progressing  Intervention: Promote Extubation and Mechanical  Ventilation Liberation  Recent Flowsheet Documentation  Taken 7/3/2025 1600 by Amelia Grijalva RN  Environmental Support:   calm environment promoted   caregiver consistency promoted   distractions minimized   environmental consistency promoted   personal routine supported   rest periods encouraged  Sleep/Rest Enhancement:   awakenings minimized   family presence promoted   natural light exposure provided   noise level reduced   regular sleep/rest pattern promoted   room darkened  Taken 7/3/2025 0800 by Amelia Grijalva RN  Environmental Support:   calm environment promoted   distractions minimized   environmental consistency promoted   rest periods encouraged  Goal: Optimal Nutrition Delivery  7/3/2025 1827 by Amelia Grijalva RN  Outcome: Progressing  7/3/2025 1827 by Amelia Grijalva RN  Outcome: Progressing  Intervention: Optimize Nutrition Delivery  Recent Flowsheet Documentation  Taken 7/3/2025 1600 by Amelia Grijalva RN  Nutrition Support Management: weight trending reviewed  Taken 7/3/2025 1200 by Amelia Grijalva RN  Nutrition Support Management: weight trending reviewed  Taken 7/3/2025 0800 by Amelia Grijalva RN  Nutrition Support Management: weight trending reviewed  Goal: Absence of Device-Related Skin and Tissue Injury  7/3/2025 1827 by Amelia Grijalva RN  Outcome: Progressing  7/3/2025 1827 by Amelia Grijalva RN  Outcome: Progressing  Intervention: Maintain Skin and Tissue Health  Recent Flowsheet Documentation  Taken 7/3/2025 1600 by Amelia Grijalva RN  Device Skin Pressure Protection:   absorbent pad utilized/changed   adhesive use limited   positioning supports utilized   pressure points protected   skin-to-device areas padded   skin-to-skin areas padded   tubing/devices free from skin contact  Taken 7/3/2025 1200 by Amelia Grijalva RN  Device Skin Pressure Protection:   absorbent pad utilized/changed   adhesive use limited   positioning supports utilized   pressure points  protected   skin-to-device areas padded   skin-to-skin areas padded   tubing/devices free from skin contact  Taken 7/3/2025 0800 by Amelia Grijalva RN  Device Skin Pressure Protection:   absorbent pad utilized/changed   positioning supports utilized   pressure points protected   skin-to-device areas padded   skin-to-skin areas padded   tubing/devices free from skin contact  Goal: Absence of Ventilator-Induced Lung Injury  7/3/2025 1827 by Amelia Grijalva RN  Outcome: Progressing  7/3/2025 1827 by Amelia Grijalva RN  Outcome: Progressing  Intervention: Prevent Ventilator-Associated Pneumonia  Recent Flowsheet Documentation  Taken 7/3/2025 1800 by Amelia Grijalva RN  Head of Bed ([facility]) Positioning:   HOB elevated   HOB at 30-45 degrees  Oral Care:   lip/mouth moisturizer applied   suction provided   teeth brushed - suction toothbrush   tongue brushed  Taken 7/3/2025 1600 by Amelia Grijalva RN  Head of Bed ([facility]) Positioning:   HOB elevated   HOB at 30-45 degrees  Oral Care:   dental appliance cleaned   oral rinse provided   suction provided   teeth brushed - suction toothbrush   tongue brushed  Taken 7/3/2025 1400 by Amelia Grijalva RN  Head of Bed ([facility]) Positioning:   HOB elevated   HOB at 30-45 degrees  Taken 7/3/2025 1200 by Amelia Grijalva RN  Head of Bed ([facility]) Positioning:   HOB elevated   HOB at 30-45 degrees  Oral Care:   teeth brushed - suction toothbrush   suction provided   mouth wash rinse   lip/mouth moisturizer applied   tongue brushed  Taken 7/3/2025 0800 by Amelia Grijalva RN  Head of Bed ([facility]) Positioning:   HOB elevated   HOB at 30-45 degrees  Oral Care:   lip/mouth moisturizer applied   suction provided   swabbed with antiseptic solution   teeth brushed - suction toothbrush   tongue brushed  Taken 7/3/2025 0700 by Amelia Grijalva RN  Head of Bed ([facility]) Positioning:   HOB elevated   HOB at 30-45 degrees     Problem: Restraint, Nonviolent  Goal: Absence of Harm or Injury  7/3/2025 1827  by Amelia Grijalva RN  Outcome: Progressing  7/3/2025 1827 by Amelia Grijalva RN  Outcome: Progressing  Intervention: Implement Least Restrictive Safety Strategies  Recent Flowsheet Documentation  Taken 7/3/2025 1800 by Amelia Grijalva RN  Medical Device Protection:   torso covered   tubing secured   IV pole/bag removed from visual field  Diversional Activities: television  Taken 7/3/2025 1637 by Amelia Grijalva RN  Medical Device Protection:   IV pole/bag removed from visual field   torso covered   tubing secured  Diversional Activities: television  Taken 7/3/2025 1600 by Amelia Grijalva RN  Medical Device Protection:   IV pole/bag removed from visual field   tubing secured   torso covered  Diversional Activities: television  Taken 7/3/2025 1400 by Amelia Grijalva RN  Medical Device Protection:   IV pole/bag removed from visual field   torso covered   tubing secured  Diversional Activities: television  Taken 7/3/2025 1200 by Amelia Grijalva RN  Medical Device Protection:   tubing secured   torso covered   IV pole/bag removed from visual field  Diversional Activities: television  Taken 7/3/2025 1000 by Amelia Grijalva RN  Medical Device Protection:   IV pole/bag removed from visual field   tubing secured   torso covered  Diversional Activities: television  Taken 7/3/2025 0800 by Amelia Grijalva RN  Medical Device Protection:   torso covered   tubing secured   IV pole/bag removed from visual field  Diversional Activities: television  Intervention: Protect Dignity, Rights and Personal Wellbeing  Recent Flowsheet Documentation  Taken 7/3/2025 1600 by Amelia Grijalva RN  Trust Relationship/Rapport:   care explained   choices provided   emotional support provided   questions answered   questions encouraged   reassurance provided   thoughts/feelings acknowledged  Intervention: Protect Skin and Joint Integrity  Recent Flowsheet Documentation  Taken 7/3/2025 1800 by Amelia Grijalva RN  Body Position:    turned   right  Taken 7/3/2025 1600 by Amelia Grijalva RN  Body Position:   turned   left  Skin Protection: pulse oximeter probe site changed  Range of Motion: active ROM (range of motion) encouraged  Taken 7/3/2025 1400 by Amelia Grijalva RN  Body Position:   turned   right  Taken 7/3/2025 1200 by Amelia Grijalva RN  Body Position:   turned   supine  Skin Protection: incontinence pads utilized  Range of Motion: ROM (range of motion) performed  Taken 7/3/2025 0800 by Amelia Grijalva RN  Body Position:   turned   left  Skin Protection:   incontinence pads utilized   pulse oximeter probe site changed  Range of Motion: ROM (range of motion) performed  Taken 7/3/2025 0700 by Amelia Grijalva RN  Body Position:   turned   right     Problem: Skin Injury Risk Increased  Goal: Skin Health and Integrity  7/3/2025 1827 by Amelia Grijalva RN  Outcome: Progressing  7/3/2025 1827 by Amelia Grijalva RN  Outcome: Progressing  Intervention: Optimize Skin Protection  Recent Flowsheet Documentation  Taken 7/3/2025 1800 by Amelia Grijalva RN  Head of Bed ([facility]) Positioning:   HOB elevated   HOB at 30-45 degrees  Taken 7/3/2025 1600 by Amelia Grijalva RN  Activity Management: bedrest  Pressure Reduction Techniques:   frequent weight shift encouraged   heels elevated off bed   pressure points protected   rest period provided between sit times   sit time limited to 2 hours   weight shift assistance provided  Head of Bed (HOB) Positioning:   HOB elevated   HOB at 30-45 degrees  Pressure Reduction Devices:   elbow protectors utilized   foam padding utilized   heel offloading device utilized   positioning supports utilized  Skin Protection: pulse oximeter probe site changed  Taken 7/3/2025 1400 by Amelia Grijalva RN  Head of Bed ([facility]) Positioning:   HOB elevated   HOB at 30-45 degrees  Taken 7/3/2025 1200 by Amelia Grijalva RN  Activity Management: bedrest  Pressure Reduction Techniques:   frequent weight shift  encouraged   heels elevated off bed   positioned off wounds   pressure points protected   sit time limited to 2 hours   rest period provided between sit times   weight shift assistance provided  Head of Bed ([facility]OB) Positioning:   HOB elevated   HOB at 30-45 degrees  Pressure Reduction Devices:   pressure-redistributing mattress utilized   positioning supports utilized   heel offloading device utilized   elbow protectors utilized   chair cushion utilized   alternating pressure pump (MOY)  Skin Protection: incontinence pads utilized  Taken 7/3/2025 0800 by Amelia Grijalva RN  Activity Management: bedrest  Pressure Reduction Techniques:   frequent weight shift encouraged   heels elevated off bed   positioned off wounds   pressure points protected   rest period provided between sit times   sit time limited to 2 hours   weight shift assistance provided  Head of Bed ([facility]) Positioning:   HOB elevated   HOB at 30-45 degrees  Skin Protection:   incontinence pads utilized   pulse oximeter probe site changed  Taken 7/3/2025 0700 by Amelia Grijalva RN  Head of Bed ([facility]) Positioning:   HOB elevated   HOB at 30-45 degrees  Intervention: Promote and Optimize Oral Intake  Recent Flowsheet Documentation  Taken 7/3/2025 1600 by Amelia Grijalva RN  Oral Nutrition Promotion: rest periods promoted  Taken 7/3/2025 1200 by Amelia Grijalva RN  Oral Nutrition Promotion: rest periods promoted  Taken 7/3/2025 0800 by Amelia Grijalva RN  Oral Nutrition Promotion: rest periods promoted     Problem: Fall Injury Risk  Goal: Absence of Fall and Fall-Related Injury  7/3/2025 1827 by Amelia Grijalva RN  Outcome: Progressing  7/3/2025 1827 by mAelia Grijalva RN  Outcome: Progressing  Intervention: Promote Injury-Free Environment  Recent Flowsheet Documentation  Taken 7/3/2025 1700 by Amelia Grijalva RN  Safety Promotion/Fall Prevention: safety round/check completed  Taken 7/3/2025 1600 by Amelia Grijalva RN  Safety Promotion/Fall  Prevention:   safety round/check completed   room organization consistent   nonskid shoes/slippers when out of bed   elopement precautions   clutter free environment maintained   assistive device/personal items within reach   activity supervised   fall prevention program maintained  Taken 7/3/2025 1500 by Amelia Grijalva RN  Safety Promotion/Fall Prevention: safety round/check completed  Taken 7/3/2025 1300 by Amelia Grijalva RN  Safety Promotion/Fall Prevention: safety round/check completed  Taken 7/3/2025 1200 by Amelia Grijalva RN  Safety Promotion/Fall Prevention: safety round/check completed  Taken 7/3/2025 1100 by Amelia Grijalva RN  Safety Promotion/Fall Prevention: safety round/check completed  Taken 7/3/2025 1000 by Amelia Grijalva RN  Safety Promotion/Fall Prevention:   safety round/check completed   room organization consistent   nonskid shoes/slippers when out of bed   fall prevention program maintained   clutter free environment maintained   assistive device/personal items within reach   activity supervised  Taken 7/3/2025 0900 by Amelia Grijalva RN  Safety Promotion/Fall Prevention:   safety round/check completed   room organization consistent   nonskid shoes/slippers when out of bed   fall prevention program maintained   elopement precautions   clutter free environment maintained   assistive device/personal items within reach   activity supervised  Taken 7/3/2025 0800 by Amelia Grijalva RN  Safety Promotion/Fall Prevention:   safety round/check completed   room organization consistent   nonskid shoes/slippers when out of bed   fall prevention program maintained   elopement precautions   clutter free environment maintained   assistive device/personal items within reach   activity supervised  Taken 7/3/2025 0700 by Amelia Grijalva RN  Safety Promotion/Fall Prevention:   safety round/check completed   room organization consistent   fall prevention program maintained   elopement  precautions   clutter free environment maintained   assistive device/personal items within reach   activity supervised     Problem: Enteral Nutrition  Goal: Absence of Aspiration Signs and Symptoms  7/3/2025 1827 by Amelia Grijalva RN  Outcome: Progressing  7/3/2025 1827 by Amelia Grijalva RN  Outcome: Progressing  Intervention: Minimize Aspiration Risk  Recent Flowsheet Documentation  Taken 7/3/2025 1800 by Amelia Grijalva RN  Head of Bed (\[facility]") Positioning:   \[facility]\"" elevated   HOB at 30-45 degrees  Oral Care:   lip/mouth moisturizer applied   suction provided   teeth brushed - suction toothbrush   tongue brushed  Taken 7/3/2025 1600 by Amelia Grijalva RN  Head of Bed (\[facility]\"") Positioning:   \[facility]\"" elevated   HOB at 30-45 degrees  Oral Care:   dental appliance cleaned   oral rinse provided   suction provided   teeth brushed - suction toothbrush   tongue brushed  Enteral Feeding Safety:   placement checked   drug-nutrient compatibility checked   tubing labeled as enteral feeding   tube marked at exit site  Taken 7/3/2025 1400 by Amelia Grijalva RN  Head of Bed (\[facility]\"") Positioning:   HOB elevated   HOB at 30-45 degrees  Taken 7/3/2025 1200 by Amelia Grijalva RN  Head of Bed (\[facility]\"") Positioning:   HOB elevated   HOB at 30-45 degrees  Oral Care:   teeth brushed - suction toothbrush   suction provided   mouth wash rinse   lip/mouth moisturizer applied   tongue brushed  Enteral Feeding Safety:   tube marked at exit site   tubing labeled as enteral feeding   placement checked  Taken 7/3/2025 0800 by Amelia Grijalva RN  Head of Bed (\[facility]\"") Positioning:   \[facility]\"" elevated   HOB at 30-45 degrees  Oral Care:   lip/mouth moisturizer applied   suction provided   swabbed with antiseptic solution   teeth brushed - suction toothbrush   tongue brushed  Enteral Feeding Safety:   placement checked   tubing labeled as enteral feeding   tube marked at exit site  Taken 7/3/2025 0700 by Amelia Grijalva RN  Head of Bed (\[facility]\"") Positioning:    HOB elevated   HOB at 30-45 degrees  Goal: Safe, Effective Therapy Delivery  7/3/2025 1827 by Amelia Grijalva RN  Outcome: Progressing  7/3/2025 1827 by Amelia Grijalva RN  Outcome: Progressing  Intervention: Prevent Feeding-Related Adverse Events  Recent Flowsheet Documentation  Taken 7/3/2025 1600 by Amelia Grijalva RN  Enteral Feeding Safety:   placement checked   drug-nutrient compatibility checked   tubing labeled as enteral feeding   tube marked at exit site  Taken 7/3/2025 1200 by Amelia Grijalva RN  Enteral Feeding Safety:   tube marked at exit site   tubing labeled as enteral feeding   placement checked  Taken 7/3/2025 0800 by Amelia Grijalva RN  Enteral Feeding Safety:   placement checked   tubing labeled as enteral feeding   tube marked at exit site  Goal: Feeding Tolerance  7/3/2025 1827 by Amelia Grijalva RN  Outcome: Progressing  7/3/2025 1827 by Amelia Grijalva RN  Outcome: Progressing  Intervention: Prevent and Manage Feeding Intolerance  Recent Flowsheet Documentation  Taken 7/3/2025 1600 by Amelia Grijalva RN  Nutrition Support Management: weight trending reviewed  Taken 7/3/2025 1200 by Amelia Grijalva RN  Nutrition Support Management: weight trending reviewed  Taken 7/3/2025 0800 by Amelia Grijalva RN  Nutrition Support Management: weight trending reviewed   Goal Outcome Evaluation:

## 2025-07-03 NOTE — PROGRESS NOTES
Patient Name: Rand Asencio  YOB: 2005  MRN: [id number]  Admission date: 6/30/2025  Reason for Encounter: Follow-up/Progress Note      [facility] Clinical Nutrition Progress Note       Nutrition Intervention Updates: New End Goal:    Peptamen AF at 65 mL/hr + 10 mL/hr water flush      Calories  1716 kcals (97%)    Protein  109 g (in range)    Free water  1158 mL   Flushes  220 mL     The above end goal rate is for 22 hrs/day to assume interruptions for ADLs. Water flushes adjusted based on clinical picture + Rx flushes/IV fluids     Specialized formula chosen r/t high protein needs in the setting of critical care, no longer on propofol.          Subjective: Progress note to check on TF. Discussed during ICU rounds. On precedex.  Remains intubated.  On precedex.  MD to begin bumex drip.  Concern for angioedema.         PO Diet: NPO Diet NPO Type: Strict NPO   PO Supplements: None    PO Intake:  NPO       Current nutrition support: Peptamen intense VHP at 65 ml/hr + 10 ml/hr FWF    Nutrition support review: Running as ordered per EMR        Labs: Reviewed, management per attending        GI Function:  No BM yet - pt has stool softeners ordered PRN (x 3 days ago) - PRN bowel regimen changed to scheduled and one time dose of lactulose today         Brief Weight Review:    Wt Readings from Last 1 Encounters:   07/03/25 0540 79.3 kg (174 lb 13.2 oz)   06/30/25 0915 78.6 kg (173 lb 4.5 oz)   06/30/25 0545 75.7 kg (166 lb 14.2 oz)   06/30/25 0142 79.8 kg (176 lb)        Results from last 7 days   Lab Units 07/03/25  0352 07/02/25  0354 07/01/25  1524 07/01/25  0320   SODIUM mmol/L 136 139  --  138   POTASSIUM mmol/L 3.8 3.8 4.2 3.3*   CHLORIDE mmol/L 101 108*  --  106   CO2 mmol/L 21.4* 21.0*  --  20.8*   BUN mg/dL 14.0 8.0  --  5.6*   CREATININE mg/dL 0.75 0.68  --  0.75   CALCIUM mg/dL 9.3 8.5*  --  8.3*   BILIRUBIN mg/dL 0.4 0.4  --  0.7   ALK PHOS U/L 56 43  --  46   ALT (SGPT) U/L 19 12  --  10    AST (SGOT) U/L 34* 26  --  23   GLUCOSE mg/dL 181* 90  --  82     Results from last 7 days   Lab Units 07/03/25  0352 07/02/25  0944 07/02/25  0354 07/01/25  0320   MAGNESIUM mg/dL 1.9  --  2.2 2.1   PHOSPHORUS mg/dL 4.0  --  4.1 2.9   PLATELETS 10*3/mm3 277  --  235 250   HEMOGLOBIN g/dL 11.5*  --  10.1* 10.2*   HEMATOCRIT % 35.3  --  31.5* 31.9*   TRIGLYCERIDES mg/dL  --  104  --   --      Lab Results   Component Value Date    HGBA1C 5.10 04/25/2025       Electronically signed by:  Mehreen Vazquez RD  07/03/25 07:15 EDT

## 2025-07-03 NOTE — PLAN OF CARE
Problem: Restraint, Nonviolent  Goal: Absence of Harm or Injury  Outcome: Not Progressing  Intervention: Implement Least Restrictive Safety Strategies  Recent Flowsheet Documentation  Taken 7/3/2025 0400 by Manasa Sy RN  Medical Device Protection:   IV pole/bag removed from visual field   torso covered   tubing secured  Diversional Activities: (quiet environment provided) other (see comments)  Taken 7/3/2025 0300 by Manasa Sy RN  Medical Device Protection:   IV pole/bag removed from visual field   torso covered   tubing secured  Taken 7/3/2025 0200 by Manasa Sy RN  Medical Device Protection:   IV pole/bag removed from visual field   torso covered   tubing secured  Diversional Activities: music  Taken 7/3/2025 0100 by Manasa Sy RN  Medical Device Protection:   tubing secured   torso covered   IV pole/bag removed from visual field  Taken 7/3/2025 0000 by Manasa Sy RN  Medical Device Protection:   torso covered   tubing secured   IV pole/bag removed from visual field  Diversional Activities: music  Taken 7/2/2025 2300 by Manasa Sy RN  Medical Device Protection:   torso covered   tubing secured   IV pole/bag removed from visual field  Taken 7/2/2025 2200 by Manasa Sy RN  Medical Device Protection:   torso covered   tubing secured   IV pole/bag removed from visual field  Diversional Activities: music  Taken 7/2/2025 2100 by Manasa Sy RN  Medical Device Protection:   IV pole/bag removed from visual field   torso covered   tubing secured  Taken 7/2/2025 2000 by Manasa Sy RN  Medical Device Protection:   torso covered   tubing secured   IV pole/bag removed from visual field  Diversional Activities: music  Intervention: Protect Dignity, Rights and Personal Wellbeing  Recent Flowsheet Documentation  Taken 7/2/2025 2000 by Manasa Sy RN  Trust Relationship/Rapport:   care explained   emotional support provided   reassurance provided    thoughts/feelings acknowledged  Intervention: Protect Skin and Joint Integrity  Recent Flowsheet Documentation  Taken 7/3/2025 0400 by Manasa Sy RN  Skin Protection: incontinence pads utilized  Taken 7/3/2025 0300 by Manasa Sy RN  Body Position:   turned   left  Taken 7/3/2025 0100 by Manasa Sy RN  Body Position: supine  Taken 7/3/2025 0000 by Manasa Sy RN  Skin Protection: incontinence pads utilized  Taken 7/2/2025 2300 by Manasa Sy RN  Body Position:   right   turned  Taken 7/2/2025 2100 by Manasa Sy RN  Body Position:   turned   left  Taken 7/2/2025 2000 by Manasa Sy RN  Skin Protection: incontinence pads utilized  Taken 7/2/2025 1900 by Manasa Sy RN  Body Position:   turned   right   Goal Outcome Evaluation:

## 2025-07-03 NOTE — PLAN OF CARE
Goal Outcome Evaluation:      Sedation was restarted overnight due to patient being restless alongside increase of heart rate to 120s-130s. Remain on 2 point restraints with SI sitter at bedside.

## 2025-07-04 ENCOUNTER — APPOINTMENT (OUTPATIENT)
Dept: CARDIOLOGY | Facility: HOSPITAL | Age: 20
End: 2025-07-04
Payer: COMMERCIAL

## 2025-07-04 LAB
ALBUMIN SERPL-MCNC: 5.1 G/DL (ref 3.5–5.2)
ALBUMIN/GLOB SERPL: 1.3 G/DL
ALP SERPL-CCNC: 87 U/L (ref 39–117)
ALT SERPL W P-5'-P-CCNC: 70 U/L (ref 1–33)
ANION GAP SERPL CALCULATED.3IONS-SCNC: 19.4 MMOL/L (ref 5–15)
ARTERIAL PATENCY WRIST A: POSITIVE
AST SERPL-CCNC: 94 U/L (ref 1–32)
ATMOSPHERIC PRESS: ABNORMAL MM[HG]
BASE EXCESS BLDA CALC-SCNC: 9.7 MMOL/L (ref 0–3)
BASOPHILS # BLD AUTO: 0.02 10*3/MM3 (ref 0–0.2)
BASOPHILS NFR BLD AUTO: 0.1 % (ref 0–1.5)
BDY SITE: ABNORMAL
BH BB BLOOD EXPIRATION DATE: NORMAL
BH BB BLOOD EXPIRATION DATE: NORMAL
BH BB BLOOD TYPE BARCODE: 5100
BH BB BLOOD TYPE BARCODE: 5100
BH BB DISPENSE STATUS: NORMAL
BH BB DISPENSE STATUS: NORMAL
BH BB PRODUCT CODE: NORMAL
BH BB PRODUCT CODE: NORMAL
BH BB UNIT NUMBER: NORMAL
BH BB UNIT NUMBER: NORMAL
BH CV UPPER VENOUS LEFT INTERNAL JUGULAR AUGMENT: NORMAL
BH CV UPPER VENOUS LEFT INTERNAL JUGULAR COMPRESS: NORMAL
BH CV UPPER VENOUS LEFT INTERNAL JUGULAR PHASIC: NORMAL
BH CV UPPER VENOUS LEFT INTERNAL JUGULAR SPONT: NORMAL
BH CV UPPER VENOUS LEFT SUBCLAVIAN AUGMENT: NORMAL
BH CV UPPER VENOUS LEFT SUBCLAVIAN COMPRESS: NORMAL
BH CV UPPER VENOUS LEFT SUBCLAVIAN PHASIC: NORMAL
BH CV UPPER VENOUS LEFT SUBCLAVIAN SPONT: NORMAL
BH CV UPPER VENOUS RIGHT AXILLARY AUGMENT: NORMAL
BH CV UPPER VENOUS RIGHT AXILLARY COMPRESS: NORMAL
BH CV UPPER VENOUS RIGHT AXILLARY PHASIC: NORMAL
BH CV UPPER VENOUS RIGHT AXILLARY SPONT: NORMAL
BH CV UPPER VENOUS RIGHT BASILIC FOREARM COMPRESS: NORMAL
BH CV UPPER VENOUS RIGHT BASILIC UPPER COMPRESS: NORMAL
BH CV UPPER VENOUS RIGHT BRACHIAL COMPRESS: NORMAL
BH CV UPPER VENOUS RIGHT CEPHALIC FOREARM COMPRESS: NORMAL
BH CV UPPER VENOUS RIGHT CEPHALIC UPPER COLOR: 1
BH CV UPPER VENOUS RIGHT CEPHALIC UPPER COMPRESS: NORMAL
BH CV UPPER VENOUS RIGHT CEPHALIC UPPER THROMBUS: NORMAL
BH CV UPPER VENOUS RIGHT INTERNAL JUGULAR AUGMENT: NORMAL
BH CV UPPER VENOUS RIGHT INTERNAL JUGULAR COMPRESS: NORMAL
BH CV UPPER VENOUS RIGHT INTERNAL JUGULAR PHASIC: NORMAL
BH CV UPPER VENOUS RIGHT INTERNAL JUGULAR SPONT: NORMAL
BH CV UPPER VENOUS RIGHT RADIAL COMPRESS: NORMAL
BH CV UPPER VENOUS RIGHT SUBCLAVIAN AUGMENT: NORMAL
BH CV UPPER VENOUS RIGHT SUBCLAVIAN COMPRESS: NORMAL
BH CV UPPER VENOUS RIGHT SUBCLAVIAN PHASIC: NORMAL
BH CV UPPER VENOUS RIGHT SUBCLAVIAN SPONT: NORMAL
BH CV UPPER VENOUS RIGHT ULNAR COMPRESS: NORMAL
BILIRUB SERPL-MCNC: 0.4 MG/DL (ref 0–1.2)
BUN SERPL-MCNC: 24.3 MG/DL (ref 6–20)
BUN/CREAT SERPL: 23.1 (ref 7–25)
CALCIUM SPEC-SCNC: 9.6 MG/DL (ref 8.6–10.5)
CHLORIDE SERPL-SCNC: 96 MMOL/L (ref 98–107)
CO2 BLDA-SCNC: 35.9 MMOL/L (ref 22–29)
CO2 SERPL-SCNC: 26.6 MMOL/L (ref 22–29)
CREAT SERPL-MCNC: 1.05 MG/DL (ref 0.57–1)
DEPRECATED RDW RBC AUTO: 41.6 FL (ref 37–54)
DEVICE COMMENT: ABNORMAL
EGFRCR SERPLBLD CKD-EPI 2021: 78.2 ML/MIN/1.73
EOSINOPHIL # BLD AUTO: 0 10*3/MM3 (ref 0–0.4)
EOSINOPHIL NFR BLD AUTO: 0 % (ref 0.3–6.2)
ERYTHROCYTE [DISTWIDTH] IN BLOOD BY AUTOMATED COUNT: 13.6 % (ref 12.3–15.4)
GLOBULIN UR ELPH-MCNC: 3.9 GM/DL
GLUCOSE BLDC GLUCOMTR-MCNC: 111 MG/DL (ref 70–105)
GLUCOSE BLDC GLUCOMTR-MCNC: 136 MG/DL (ref 70–105)
GLUCOSE BLDC GLUCOMTR-MCNC: 140 MG/DL (ref 70–105)
GLUCOSE BLDC GLUCOMTR-MCNC: 156 MG/DL (ref 70–105)
GLUCOSE SERPL-MCNC: 141 MG/DL (ref 65–99)
HCO3 BLDA-SCNC: 34.5 MMOL/L (ref 21–28)
HCT VFR BLD AUTO: 39.1 % (ref 34–46.6)
HEMODILUTION: NO
HGB BLD-MCNC: 12.7 G/DL (ref 12–15.9)
IMM GRANULOCYTES # BLD AUTO: 0.08 10*3/MM3 (ref 0–0.05)
IMM GRANULOCYTES NFR BLD AUTO: 0.4 % (ref 0–0.5)
INHALED O2 CONCENTRATION: 32 %
LYMPHOCYTES # BLD AUTO: 0.36 10*3/MM3 (ref 0.7–3.1)
LYMPHOCYTES NFR BLD AUTO: 1.9 % (ref 19.6–45.3)
MAGNESIUM SERPL-MCNC: 2.4 MG/DL (ref 1.7–2.2)
MCH RBC QN AUTO: 27.3 PG (ref 26.6–33)
MCHC RBC AUTO-ENTMCNC: 32.5 G/DL (ref 31.5–35.7)
MCV RBC AUTO: 83.9 FL (ref 79–97)
MODALITY: ABNORMAL
MONOCYTES # BLD AUTO: 0.84 10*3/MM3 (ref 0.1–0.9)
MONOCYTES NFR BLD AUTO: 4.5 % (ref 5–12)
NEUTROPHILS NFR BLD AUTO: 17.4 10*3/MM3 (ref 1.7–7)
NEUTROPHILS NFR BLD AUTO: 93.1 % (ref 42.7–76)
NRBC BLD AUTO-RTO: 0 /100 WBC (ref 0–0.2)
PCO2 BLDA: 45.9 MM HG (ref 35–48)
PH BLDA: 7.48 PH UNITS (ref 7.35–7.45)
PHOSPHATE SERPL-MCNC: 5 MG/DL (ref 2.5–4.5)
PLATELET # BLD AUTO: 337 10*3/MM3 (ref 140–450)
PMV BLD AUTO: 10.2 FL (ref 6–12)
PO2 BLD: 231 MM[HG] (ref 0–500)
PO2 BLDA: 73.8 MM HG (ref 83–108)
POTASSIUM SERPL-SCNC: 3.7 MMOL/L (ref 3.5–5.2)
PROT SERPL-MCNC: 9 G/DL (ref 6–8.5)
RBC # BLD AUTO: 4.66 10*6/MM3 (ref 3.77–5.28)
SAO2 % BLDCOA: 95.5 % (ref 94–98)
SODIUM SERPL-SCNC: 142 MMOL/L (ref 136–145)
UNIT  ABO: NORMAL
UNIT  ABO: NORMAL
UNIT  RH: NORMAL
UNIT  RH: NORMAL
WBC NRBC COR # BLD AUTO: 18.7 10*3/MM3 (ref 3.4–10.8)

## 2025-07-04 PROCEDURE — 94664 DEMO&/EVAL PT USE INHALER: CPT

## 2025-07-04 PROCEDURE — 83735 ASSAY OF MAGNESIUM: CPT

## 2025-07-04 PROCEDURE — 25010000002 LACOSAMIDE 200 MG/20ML SOLUTION: Performed by: PSYCHIATRY & NEUROLOGY

## 2025-07-04 PROCEDURE — 80053 COMPREHEN METABOLIC PANEL: CPT

## 2025-07-04 PROCEDURE — 25010000002 METHYLPREDNISOLONE PER 125 MG: Performed by: INTERNAL MEDICINE

## 2025-07-04 PROCEDURE — 25010000002 LEVETRIRACETAM PER 10 MG: Performed by: PSYCHIATRY & NEUROLOGY

## 2025-07-04 PROCEDURE — 36600 WITHDRAWAL OF ARTERIAL BLOOD: CPT

## 2025-07-04 PROCEDURE — 82948 REAGENT STRIP/BLOOD GLUCOSE: CPT | Performed by: NURSE PRACTITIONER

## 2025-07-04 PROCEDURE — 82948 REAGENT STRIP/BLOOD GLUCOSE: CPT

## 2025-07-04 PROCEDURE — 25010000002 HALOPERIDOL LACTATE PER 5 MG: Performed by: PSYCHIATRY & NEUROLOGY

## 2025-07-04 PROCEDURE — 94799 UNLISTED PULMONARY SVC/PX: CPT

## 2025-07-04 PROCEDURE — 84100 ASSAY OF PHOSPHORUS: CPT

## 2025-07-04 PROCEDURE — 82803 BLOOD GASES ANY COMBINATION: CPT

## 2025-07-04 PROCEDURE — 93971 EXTREMITY STUDY: CPT | Performed by: SURGERY

## 2025-07-04 PROCEDURE — 93971 EXTREMITY STUDY: CPT

## 2025-07-04 PROCEDURE — 85025 COMPLETE CBC W/AUTO DIFF WBC: CPT

## 2025-07-04 PROCEDURE — C9254 INJECTION, LACOSAMIDE: HCPCS | Performed by: PSYCHIATRY & NEUROLOGY

## 2025-07-04 PROCEDURE — 25010000002 ENOXAPARIN PER 10 MG: Performed by: NURSE PRACTITIONER

## 2025-07-04 PROCEDURE — 63710000001 INSULIN LISPRO (HUMAN) PER 5 UNITS: Performed by: NURSE PRACTITIONER

## 2025-07-04 PROCEDURE — 94761 N-INVAS EAR/PLS OXIMETRY MLT: CPT

## 2025-07-04 RX ORDER — HALOPERIDOL 5 MG/ML
5 INJECTION INTRAMUSCULAR EVERY 6 HOURS PRN
Status: DISCONTINUED | OUTPATIENT
Start: 2025-07-04 | End: 2025-07-08 | Stop reason: HOSPADM

## 2025-07-04 RX ORDER — METOPROLOL TARTRATE 25 MG/1
25 TABLET, FILM COATED ORAL EVERY 12 HOURS SCHEDULED
Status: DISCONTINUED | OUTPATIENT
Start: 2025-07-04 | End: 2025-07-05

## 2025-07-04 RX ORDER — METOPROLOL TARTRATE 1 MG/ML
5 INJECTION, SOLUTION INTRAVENOUS
Status: DISCONTINUED | OUTPATIENT
Start: 2025-07-04 | End: 2025-07-08 | Stop reason: HOSPADM

## 2025-07-04 RX ADMIN — SENNOSIDES AND DOCUSATE SODIUM 2 TABLET: 50; 8.6 TABLET ORAL at 08:47

## 2025-07-04 RX ADMIN — FAMOTIDINE 20 MG: 20 TABLET, FILM COATED ORAL at 08:47

## 2025-07-04 RX ADMIN — LACOSAMIDE 50 MG: 10 INJECTION INTRAVENOUS at 20:19

## 2025-07-04 RX ADMIN — MUPIROCIN 1 APPLICATION: 20 OINTMENT TOPICAL at 08:47

## 2025-07-04 RX ADMIN — METHYLPREDNISOLONE SODIUM SUCCINATE 80 MG: 125 INJECTION, POWDER, FOR SOLUTION INTRAMUSCULAR; INTRAVENOUS at 05:56

## 2025-07-04 RX ADMIN — LEVETIRACETAM 500 MG: 500 INJECTION, SOLUTION INTRAVENOUS at 20:18

## 2025-07-04 RX ADMIN — BUDESONIDE 0.5 MG: 0.5 SUSPENSION RESPIRATORY (INHALATION) at 06:55

## 2025-07-04 RX ADMIN — Medication 10 ML: at 08:46

## 2025-07-04 RX ADMIN — VALPROATE SODIUM 500 MG: 100 INJECTION, SOLUTION INTRAVENOUS at 08:46

## 2025-07-04 RX ADMIN — SENNOSIDES AND DOCUSATE SODIUM 2 TABLET: 50; 8.6 TABLET ORAL at 20:19

## 2025-07-04 RX ADMIN — LEVETIRACETAM 500 MG: 500 INJECTION, SOLUTION INTRAVENOUS at 08:47

## 2025-07-04 RX ADMIN — CETIRIZINE HYDROCHLORIDE 10 MG: 10 TABLET, FILM COATED ORAL at 08:47

## 2025-07-04 RX ADMIN — HALOPERIDOL LACTATE 5 MG: 5 INJECTION, SOLUTION INTRAMUSCULAR at 19:36

## 2025-07-04 RX ADMIN — BISACODYL 5 MG: 5 TABLET, COATED ORAL at 08:47

## 2025-07-04 RX ADMIN — INSULIN LISPRO 2 UNITS: 100 INJECTION, SOLUTION INTRAVENOUS; SUBCUTANEOUS at 05:56

## 2025-07-04 RX ADMIN — BUDESONIDE 0.5 MG: 0.5 SUSPENSION RESPIRATORY (INHALATION) at 19:53

## 2025-07-04 RX ADMIN — POLYETHYLENE GLYCOL 3350 17 G: 17 POWDER, FOR SOLUTION ORAL at 08:47

## 2025-07-04 RX ADMIN — LACOSAMIDE 50 MG: 10 INJECTION INTRAVENOUS at 08:47

## 2025-07-04 RX ADMIN — FAMOTIDINE 20 MG: 20 TABLET, FILM COATED ORAL at 17:53

## 2025-07-04 RX ADMIN — ENOXAPARIN SODIUM 40 MG: 100 INJECTION SUBCUTANEOUS at 17:53

## 2025-07-04 NOTE — PROGRESS NOTES
Events noted  Patient extubated  Not really talking or following commands    Withdraws to painful stimuli and spontaneously moving all extremities    Nothing suggesting active seizures    No roving eye movements or disconjugate gaze    I am going to start working on her antiepileptics because I do not think she needs 3 antiepileptics because I do not think she is in seizures or status state right now    I think this was all medication related  I am hoping that in future she would not need any      I will start with discontinuing Depacon and then we will see what else we can decrease, if she is more awake and seizure-free   Nasal mucosa clear.  Mouth with normal mucosa. Throat is clear.

## 2025-07-04 NOTE — PLAN OF CARE
Goal Outcome Evaluation:    Patient remains on room air. No gtts. Does not follow commands. Combative all day - attempted to bite staff at multiple points. In five point restraints. Bilateral wrists, ankles, and chest vest. Tolerated TF through NG until 1130 - in which case, TF stopped d/t risk of aspiration. Family updated. FC collected around 1150mL all day. No BM on shift. Patient continued to bleed via menstrual cycle - unknown amount and it was unable to be collected.     Remains ICU level patient.

## 2025-07-04 NOTE — CASE MANAGEMENT/SOCIAL WORK
Social Work Assessment  [facility]     Patient Name: Rand Asencio  MRN: [id number]  Today's Date: 7/4/2025    Admit Date: 6/30/2025         Discharge Plan       Row Name 07/04/25 1101       Plan    Plan Comments Per chart review and sc with MARIAELENA Kaufman, Pt is disoriented for assessment. Pt has a hx of trauma and no family present. LSW following to assess and locate appropriate legal NOK.           Diego Donato, BILLW, MSW    Phone: [phone]  Fax: [phone]  Email: [email or web address]

## 2025-07-04 NOTE — PROGRESS NOTES
Critical Care Progress Note     Rand Asencio : 2005 MRN:[id number] LOS:4  Rm: 2310/1     Principal Problem: Intentional overdose     Reason for follow up: All the medical problems listed below    Summary      Rand Asencio is a 20 y.o. old female patient with PMH of mood disorder with anxiety and depression, asthma and arthritis who presented to the ED on 2025 for evaluation after an intentional suicide attempt 30 minutes prior to arrival.  The following information has been obtained primarily from review of documentation and collaboration with other providers as the patient is postictal and encephalopathic at the time of examination in the ED.  The patient reported in the ED that she and her partner had gotten into an argument after which she was very upset and ingested her prescribed medications of Lamictal, fluoxetine, and Vraylar with the intention of suicide.  The patient admitted to the ED provider that she has a history of suicide attempts with cutting in the past.  The patient also reported generalized pain, chest pain, and nausea and vomiting.  Poison control was contacted by ED staff and the recommendation was an 8-hour observation at the hospital.  During the patient's time in the ED she had 2 episodes of seizure-like activity which were aborted by IV Ativan.  The patient was admitted and neurology was consulted.  She had at least 2 more witnessed seizure-like activity episodes therefore Keppra and Vimpat were initiated.  After she had several more witnessed episodes of seizure-like activity it was determined that it would benefit her to transfer to the ICU for further medical management and due to the risk of need of airway protection if she develops status epilepticus.  At the time of arrival to the ICU the patient was awake though with a blunted affect.  She was able to answer some questions of orientation however she has no insight into the current situation.  Her airway is self  protected and she is in no acute distress.     Daily progress:  7/1: The patient is intubated and heavily sedated. She required intubation for airway protection yesterday after multiple episodes of seizure-like activity. Not all of her episodes appeared to be true seizures however she did desaturate with some of the events. She also had episodes of decreased LOC, either feigned or due to seizure/post-ictal. No seizures overnight.  Due to the Lamictal overdose, the patient is at risk for Faust-Hilario syndrome.  She currently does not have a rash.  Oral examination is limited due to the presence of oral ET tube however the hard palate seems to be free of any kind of desquamating.  She remains afebrile.  Will start tube feeds as it is unclear how long the patient will need to be intubated.  The half-life of the medications she took have variable rates     7/2: Continues with facial swelling. No current rash visualized. Replace garcia catheter as patient ingested anticholinergic medications preventing her for spontaneous urination, please discuss with MD on this patient before following protocol due to more specific nature or it's placement; required straight cath x 3 overnight, without any spontaneous urination. Still with swelling of face and neck.  Trial of Bumex this morning. More awake, but not following commands; preventing appropriateness for extubation.  No seizure like activity visualized. Tube feeding planned to be initiated today. Labs reviewed and within appropriate parameters.  Reviewed home medications, restarting appropriate medications.     7/3: Facial swelling slightly improved after Bumex.  Looked at patient's weights over past 15 days, considerable weight gain, likely edema.  Starting on Bumex gtt. Checking echocardiogram.  Concern for possible angioedema, though unlikely, giving 2 units of FFP and adding famotidine to previously started steroids. Avoiding benadryl due to added cholinergic effects.   Plan to leave patient's sedation off, and attempt to extubate if patient becomes better at command following.    ACP: No ACP documentation on file.    Patient is on Hospital Day: 5.    Significant Events / Subjective     07/04/25 : Completed steroids, no further evidence of facial or tongue swelling. While trying to wean patient, she was having waxing and waning mentation, placed cerebell on patient that did not alarm. Extubated overnight.  Moving around, intermittently command following, but now that she is extubated, avoiding all sedating medications until patient awakens, likely still having issues with midazolam metabolites. Stopped Bumex gtt, UOP 5120 over past 24 hours with Net IO Since Admission: -1,983 mL [07/04/25 1244]. On tube feeding. Discussed with social work, she is going to work to help establish a legal next of kin as patient is known to have >10 siblings, and she is estranged from her father, and has a difficult relationship with mother. She is going to work on it.      Assessment / Plan     Acute respiratory failure with hypoxia / on mechanically assisted ventilation; resolving  Facial swelling, possible angioedema; resolved  Likely due to seizure/post-ictal & possible malingering. Intubated for airway protection and correction of hypoxia on 6/29. Extubated 7/3.  Minimize sedation/analgesia to keep RASS of 0 to -1. Off all sedation.  Mentation and facial swelling preventing appropriateness to extubation.  Completed steroids, received 2 units of FFP, and famotidine.    Acute toxic encephalopathy  Polysubstance ingestion  New onset seizure activity, likely attributed to medication ingestion lowering seizure threshold  CT head negative for any acute intracranial findings  Tox screen positive for THC  Negative HCG.  Neurology following  Keppra loading dose and then every 12 hours  Continue Keppra, Depacon, and Vimpat.  EEG 6/30/2025: Nothing suggesting clear-cut epileptiform activity.  Supportive care  for now.  Intubated for airway protection & hypoxia on 6/29. Extubated 7/3.  Replace garcia catheter as patient ingested anticholinergic medications preventing her for spontaneous urination, please discuss with MD on this patient before following protocol due to more specific nature or it's placement.  Cerebell was placed on patient on 7/3, did not alarm.  Due to continued encephalopathy, placing back on patient on 7/4.    Weight gain, recent  With the patient's swelling, reviewed patient's weight on 6/17/2025, at this facility, patient weighed 69 kg, on admission patient's weight noted at 78.6, and furthermore as of 7/3 was 78.9 kg.  Responded well to diuresis. Weight down to 73.6.  Stopping diuretics at this time.  Monitor I/O's.  Echocardiogram on 6/30, EF 56-60%, normal diastolic and systolic function.    Right upper extremity swelling  Duplex of RUE ordered.    Mood disorder with anxiety and depression  Intentional polysubstance ingestion  History of self-destructive behaviors including cutting.  Poison control previously consulted, recommendations received and are in place as appropriate.  Consult behavioral health for behavioral medication management when appropriate.  Sitter until cleared by psychiatry.     History of asthma  Continue budesonide per home medications.  DuoNebs as needed       Disposition: ICU, due to mental status.    Code status:   Code Status (Patient has no pulse and is not breathing): CPR (Attempt to Resuscitate)  Medical Interventions (Patient has pulse or is breathing): Full Support       Nutrition:   NPO Diet NPO Type: Strict NPO   Tube Feeding: Formula: Peptamen AF (Vital AF 1.2); Feeding Type: Continuous; Start at: Other; Other: 65; Then Advance By: Do Not Advance; Water Flush: 10 mL; Every: 1 hour; Water Bolus: None     VTE Prophylaxis:  Pharmacologic & mechanical VTE prophylaxis orders are present.    Objective / Physical Exam     Vital signs:  Temp: 98.2 °F (36.8 °C)  BP:  96/64  Heart Rate: 91  Resp: 13  SpO2: 97 %  Weight: 73.6 kg (162 lb 4.1 oz)    Admission Weight: Weight: 79.8 kg (176 lb)  Current Weight: Weight: 73.6 kg (162 lb 4.1 oz)    Input/Output in last 24 hours:    Intake/Output Summary (Last 24 hours) at 7/4/2025 1227  Last data filed at 7/4/2025 0845  Gross per 24 hour   Intake 1326 ml   Output 3720 ml   Net -2394 ml      Net IO Since Admission: -1,983 mL [07/04/25 1227]     Physical Exam  Vitals and nursing note reviewed.   Constitutional:       General: She is not in acute distress.     Appearance: She is obese. She is ill-appearing. She is not toxic-appearing.      Comments: Confused/encephalopathic   HENT:      Head: Normocephalic and atraumatic.      Right Ear: External ear normal.      Left Ear: External ear normal.      Nose:      Comments: Left NGT in place.     Mouth/Throat:      Mouth: Mucous membranes are moist.      Pharynx: Oropharynx is clear.      Comments: No obvious palatal rash noted.  Eyes:      Comments: Eyelids closed. Pinpoint pupils.   Cardiovascular:      Rate and Rhythm: Normal rate and regular rhythm.      Pulses: Normal pulses.      Heart sounds: Normal heart sounds, S1 normal and S2 normal.      Comments: Sinus rhythm  Pulmonary:      Breath sounds: Normal breath sounds. No wheezing or rhonchi.      Comments: Diminished bibasilar breath sounds.  Abdominal:      General: There is no distension.      Palpations: Abdomen is soft.      Tenderness: There is no abdominal tenderness.      Comments: Morbid body habitus  Hypoactive bowel sounds   Musculoskeletal:         General: Swelling (RUE; extremity warm.) present.      Cervical back: Neck supple. No rigidity.      Right lower leg: No edema.      Left lower leg: No edema.   Skin:     General: Skin is warm and dry.      Findings: No rash.   Neurological:      General: No focal deficit present.      Comments: Not following commands, spontaneously moving extremities. Unable to assess orientation.    Psychiatric:      Comments: Anxious/restless, confused.          Radiology and Labs     Results from last 7 days   Lab Units 07/04/25  0311 07/03/25  0352 07/02/25  0354 07/01/25 0320 06/30/25  0844   WBC 10*3/mm3 18.70* 11.30* 6.81 7.63 11.38*   HEMOGLOBIN g/dL 12.7 11.5* 10.1* 10.2* 11.5*   HEMATOCRIT % 39.1 35.3 31.5* 31.9* 35.4   PLATELETS 10*3/mm3 337 277 235 250 303           Results from last 7 days   Lab Units 07/04/25  0311 07/03/25  1727 07/03/25  0352 07/02/25  0354 07/01/25  1524 07/01/25 0320 06/30/25 2128 06/30/25  0844   SODIUM mmol/L 142 142 136 139  --  138  --  139   POTASSIUM mmol/L 3.7 3.5 3.8 3.8 4.2 3.3*   < > 3.5   CHLORIDE mmol/L 96* 98 101 108*  --  106  --  106   CO2 mmol/L 26.6 26.7 21.4* 21.0*  --  20.8*  --  17.6*   ANION GAP mmol/L 19.4* 17.3* 13.6 10.0  --  11.2  --  15.4*   BUN mg/dL 24.3* 19.9 14.0 8.0  --  5.6*  --  7.3   CREATININE mg/dL 1.05* 0.81 0.75 0.68  --  0.75  --  0.77   GLUCOSE mg/dL 141* 133* 181* 90  --  82  --  94   PHOSPHORUS mg/dL 5.0* 3.4 4.0 4.1  --  2.9  --  3.1   MAGNESIUM mg/dL 2.4* 2.1 1.9 2.2  --  2.1  --  2.0   ALT (SGPT) U/L 70*  --  19 12  --  10  --  17   AST (SGOT) U/L 94*  --  34* 26  --  23  --  31   ALK PHOS U/L 87  --  56 43  --  46  --  49    < > = values in this interval not displayed.      Results from last 7 days   Lab Units 07/04/25  0311 07/03/25  0352 07/02/25  0354 07/01/25  0320 06/30/25  0844   ALT (SGPT) U/L 70* 19 12 10 17   AST (SGOT) U/L 94* 34* 26 23 31   ALK PHOS U/L 87 56 43 46 49     Results from last 7 days   Lab Units 07/04/25  0101 07/03/25  1638 07/03/25  0333 07/02/25  0343 07/01/25  0249   PH, ARTERIAL pH units 7.484* 7.480* 7.454* 7.408 7.429   PCO2, ARTERIAL mm Hg 45.9 43.6 36.1 40.2 37.4   PO2 ART mm Hg 73.8* 79.0* 97.6 135.4* 206.8*   O2 SATURATION ART % 95.5 96.3 98.0 99.1* 99.8*   FIO2 % 32 30 30 30 40   HCO3 ART mmol/L 34.5* 32.5* 25.3 25.4 24.8   BASE EXCESS ART mmol/L 9.7* 8.1* 1.6 0.6 0.5       No radiology  results for the last day      Current medications     Scheduled Meds:   budesonide, 0.5 mg, Nebulization, BID  [Held by provider] Cariprazine HCl, 1.5 mg, Oral, Daily  cetirizine, 10 mg, Nasogastric, Daily  enoxaparin sodium, 40 mg, Subcutaneous, Q24H  famotidine, 20 mg, Nasogastric, BID AC  [Held by provider] fluvoxaMINE, 100 mg, Oral, Nightly  insulin lispro, 2-7 Units, Subcutaneous, Q6H  Lacosamide, 50 mg, Intravenous, Q12H  [Held by provider] lamoTRIgine, 100 mg, Oral, Q12H  levETIRAcetam, 500 mg, Intravenous, Q12H  mupirocin, 1 Application, Each Nare, BID  senna-docusate sodium, 2 tablet, Nasogastric, BID   And  polyethylene glycol, 17 g, Nasogastric, Daily  sodium chloride, 10 mL, Intravenous, Q12H  valproate sodium, 500 mg, Intravenous, Q12H        Continuous Infusions:        Plan discussed with RN. Reviewed all other data in the last 24 hours, including but not limited to vitals, labs, microbiology, imaging and pertinent notes from other providers.     MARIAELENA Harper   Critical Care  07/04/25   12:27 EDT

## 2025-07-04 NOTE — PROGRESS NOTES
Patient Name: Rand Asencio  YOB: 2005  MRN: [id number]  Admission date: 6/30/2025  Reason for Encounter: Follow-up/Progress Note      [facility] Nutrition Progress Note       Nutrition Intervention Updates: Continue current TF regimen.    Monitor ability to swallow now that she is extubated     Recommend to continue to provide bowel regimen until pt has a BM         Subjective: Progress note to check on TF. Pt was extubated yesterday. Will adjust estimated needs. Pt is on Bumex.     Estimated/Assessed Needs       Energy Requirements    EST Needs, Method, Wt used 1098-0712 kcal/day (20-25 kcal/kg using CBW)        Protein Requirements    EST Needs, Method, Wt used  g/day (1.2-2 g/kg using IBW)        Fluid Requirements     Estimated Needs (mL/day) 1 ml/kcal            PO Diet: NPO Diet NPO Type: Strict NPO   PO Supplements: None    PO Intake:  NPO       Current nutrition support: Peptamen intense VHP at 65 ml/hr + 10 ml/hr FWF    Nutrition support review: Running as ordered per EMR        Labs: Phos is elevated - will monitor        GI Function:  Still no BM yet despite one time dose of lactulose yesterday         Brief Weight Review:    Wt Readings from Last 1 Encounters:   07/04/25 0500 73.6 kg (162 lb 4.1 oz)   07/03/25 0955 78.9 kg (174 lb)   07/03/25 0540 79.3 kg (174 lb 13.2 oz)   06/30/25 0915 78.6 kg (173 lb 4.5 oz)   06/30/25 0545 75.7 kg (166 lb 14.2 oz)   06/30/25 0142 79.8 kg (176 lb)        Results from last 7 days   Lab Units 07/04/25  0311 07/03/25  1727 07/03/25  0352 07/02/25  0354   SODIUM mmol/L 142 142 136 139   POTASSIUM mmol/L 3.7 3.5 3.8 3.8   CHLORIDE mmol/L 96* 98 101 108*   CO2 mmol/L 26.6 26.7 21.4* 21.0*   BUN mg/dL 24.3* 19.9 14.0 8.0   CREATININE mg/dL 1.05* 0.81 0.75 0.68   CALCIUM mg/dL 9.6 9.2 9.3 8.5*   BILIRUBIN mg/dL 0.4  --  0.4 0.4   ALK PHOS U/L 87  --  56 43   ALT (SGPT) U/L 70*  --  19 12   AST (SGOT) U/L 94*  --  34* 26   GLUCOSE mg/dL  141* 133* 181* 90     Results from last 7 days   Lab Units 07/04/25  0311 07/03/25  1727 07/03/25  0352 07/02/25  0944   MAGNESIUM mg/dL 2.4* 2.1 1.9  --    PHOSPHORUS mg/dL 5.0* 3.4 4.0  --    PLATELETS 10*3/mm3 337  --  277  --    HEMOGLOBIN g/dL 12.7  --  11.5*  --    HEMATOCRIT % 39.1  --  35.3  --    TRIGLYCERIDES mg/dL  --   --   --  104     Lab Results   Component Value Date    HGBA1C 5.10 04/25/2025       Electronically signed by:  Jessica Dorsey RD  07/04/25 07:59 EDT

## 2025-07-05 LAB
ALBUMIN SERPL-MCNC: 4.5 G/DL (ref 3.5–5.2)
ALBUMIN SERPL-MCNC: 4.7 G/DL (ref 3.5–5.2)
ALBUMIN/GLOB SERPL: 1.3 G/DL
ALBUMIN/GLOB SERPL: 1.3 G/DL
ALP SERPL-CCNC: 63 U/L (ref 39–117)
ALP SERPL-CCNC: 67 U/L (ref 39–117)
ALT SERPL W P-5'-P-CCNC: 41 U/L (ref 1–33)
ALT SERPL W P-5'-P-CCNC: 46 U/L (ref 1–33)
ANION GAP SERPL CALCULATED.3IONS-SCNC: 14.6 MMOL/L (ref 5–15)
ANION GAP SERPL CALCULATED.3IONS-SCNC: 18 MMOL/L (ref 5–15)
AST SERPL-CCNC: 42 U/L (ref 1–32)
AST SERPL-CCNC: 57 U/L (ref 1–32)
BASOPHILS # BLD AUTO: 0.01 10*3/MM3 (ref 0–0.2)
BASOPHILS # BLD AUTO: 0.01 10*3/MM3 (ref 0–0.2)
BASOPHILS NFR BLD AUTO: 0.1 % (ref 0–1.5)
BASOPHILS NFR BLD AUTO: 0.1 % (ref 0–1.5)
BILIRUB SERPL-MCNC: 0.4 MG/DL (ref 0–1.2)
BILIRUB SERPL-MCNC: 0.4 MG/DL (ref 0–1.2)
BUN SERPL-MCNC: 43.8 MG/DL (ref 6–20)
BUN SERPL-MCNC: 50.5 MG/DL (ref 6–20)
BUN/CREAT SERPL: 55.4 (ref 7–25)
BUN/CREAT SERPL: 55.5 (ref 7–25)
CALCIUM SPEC-SCNC: 9.1 MG/DL (ref 8.6–10.5)
CALCIUM SPEC-SCNC: 9.7 MG/DL (ref 8.6–10.5)
CHLORIDE SERPL-SCNC: 101 MMOL/L (ref 98–107)
CHLORIDE SERPL-SCNC: 101 MMOL/L (ref 98–107)
CO2 SERPL-SCNC: 24 MMOL/L (ref 22–29)
CO2 SERPL-SCNC: 26.4 MMOL/L (ref 22–29)
CREAT SERPL-MCNC: 0.79 MG/DL (ref 0.57–1)
CREAT SERPL-MCNC: 0.91 MG/DL (ref 0.57–1)
DEPRECATED RDW RBC AUTO: 42.5 FL (ref 37–54)
DEPRECATED RDW RBC AUTO: 42.7 FL (ref 37–54)
EGFRCR SERPLBLD CKD-EPI 2021: 110 ML/MIN/1.73
EGFRCR SERPLBLD CKD-EPI 2021: 92.8 ML/MIN/1.73
EOSINOPHIL # BLD AUTO: 0 10*3/MM3 (ref 0–0.4)
EOSINOPHIL # BLD AUTO: 0.01 10*3/MM3 (ref 0–0.4)
EOSINOPHIL NFR BLD AUTO: 0 % (ref 0.3–6.2)
EOSINOPHIL NFR BLD AUTO: 0.1 % (ref 0.3–6.2)
ERYTHROCYTE [DISTWIDTH] IN BLOOD BY AUTOMATED COUNT: 13.6 % (ref 12.3–15.4)
ERYTHROCYTE [DISTWIDTH] IN BLOOD BY AUTOMATED COUNT: 13.7 % (ref 12.3–15.4)
GLOBULIN UR ELPH-MCNC: 3.5 GM/DL
GLOBULIN UR ELPH-MCNC: 3.5 GM/DL
GLUCOSE BLDC GLUCOMTR-MCNC: 106 MG/DL (ref 70–105)
GLUCOSE BLDC GLUCOMTR-MCNC: 98 MG/DL (ref 70–105)
GLUCOSE SERPL-MCNC: 105 MG/DL (ref 65–99)
GLUCOSE SERPL-MCNC: 92 MG/DL (ref 65–99)
HCT VFR BLD AUTO: 37.2 % (ref 34–46.6)
HCT VFR BLD AUTO: 38 % (ref 34–46.6)
HGB BLD-MCNC: 12 G/DL (ref 12–15.9)
HGB BLD-MCNC: 12.2 G/DL (ref 12–15.9)
HIV 1+2 AB+HIV1 P24 AG SERPL QL IA: NORMAL
IMM GRANULOCYTES # BLD AUTO: 0.04 10*3/MM3 (ref 0–0.05)
IMM GRANULOCYTES # BLD AUTO: 0.08 10*3/MM3 (ref 0–0.05)
IMM GRANULOCYTES NFR BLD AUTO: 0.4 % (ref 0–0.5)
IMM GRANULOCYTES NFR BLD AUTO: 0.6 % (ref 0–0.5)
LYMPHOCYTES # BLD AUTO: 1.09 10*3/MM3 (ref 0.7–3.1)
LYMPHOCYTES # BLD AUTO: 1.39 10*3/MM3 (ref 0.7–3.1)
LYMPHOCYTES NFR BLD AUTO: 14.5 % (ref 19.6–45.3)
LYMPHOCYTES NFR BLD AUTO: 8.3 % (ref 19.6–45.3)
MAGNESIUM SERPL-MCNC: 2.8 MG/DL (ref 1.7–2.2)
MAGNESIUM SERPL-MCNC: 2.9 MG/DL (ref 1.7–2.2)
MCH RBC QN AUTO: 27.4 PG (ref 26.6–33)
MCH RBC QN AUTO: 27.4 PG (ref 26.6–33)
MCHC RBC AUTO-ENTMCNC: 32.1 G/DL (ref 31.5–35.7)
MCHC RBC AUTO-ENTMCNC: 32.3 G/DL (ref 31.5–35.7)
MCV RBC AUTO: 84.9 FL (ref 79–97)
MCV RBC AUTO: 85.4 FL (ref 79–97)
MONOCYTES # BLD AUTO: 1.17 10*3/MM3 (ref 0.1–0.9)
MONOCYTES # BLD AUTO: 1.65 10*3/MM3 (ref 0.1–0.9)
MONOCYTES NFR BLD AUTO: 12.2 % (ref 5–12)
MONOCYTES NFR BLD AUTO: 12.6 % (ref 5–12)
NEUTROPHILS NFR BLD AUTO: 10.23 10*3/MM3 (ref 1.7–7)
NEUTROPHILS NFR BLD AUTO: 6.96 10*3/MM3 (ref 1.7–7)
NEUTROPHILS NFR BLD AUTO: 72.7 % (ref 42.7–76)
NEUTROPHILS NFR BLD AUTO: 78.4 % (ref 42.7–76)
NRBC BLD AUTO-RTO: 0 /100 WBC (ref 0–0.2)
NRBC BLD AUTO-RTO: 0 /100 WBC (ref 0–0.2)
PHOSPHATE SERPL-MCNC: 3.7 MG/DL (ref 2.5–4.5)
PHOSPHATE SERPL-MCNC: 4.5 MG/DL (ref 2.5–4.5)
PLATELET # BLD AUTO: 261 10*3/MM3 (ref 140–450)
PLATELET # BLD AUTO: 313 10*3/MM3 (ref 140–450)
PMV BLD AUTO: 10.5 FL (ref 6–12)
PMV BLD AUTO: 9.9 FL (ref 6–12)
POTASSIUM SERPL-SCNC: 3.1 MMOL/L (ref 3.5–5.2)
POTASSIUM SERPL-SCNC: 3.7 MMOL/L (ref 3.5–5.2)
PROT SERPL-MCNC: 8 G/DL (ref 6–8.5)
PROT SERPL-MCNC: 8.2 G/DL (ref 6–8.5)
RBC # BLD AUTO: 4.38 10*6/MM3 (ref 3.77–5.28)
RBC # BLD AUTO: 4.45 10*6/MM3 (ref 3.77–5.28)
SODIUM SERPL-SCNC: 142 MMOL/L (ref 136–145)
SODIUM SERPL-SCNC: 143 MMOL/L (ref 136–145)
WBC NRBC COR # BLD AUTO: 13.06 10*3/MM3 (ref 3.4–10.8)
WBC NRBC COR # BLD AUTO: 9.58 10*3/MM3 (ref 3.4–10.8)

## 2025-07-05 PROCEDURE — 84100 ASSAY OF PHOSPHORUS: CPT

## 2025-07-05 PROCEDURE — 80053 COMPREHEN METABOLIC PANEL: CPT

## 2025-07-05 PROCEDURE — 83735 ASSAY OF MAGNESIUM: CPT

## 2025-07-05 PROCEDURE — 25010000002 ENOXAPARIN PER 10 MG: Performed by: NURSE PRACTITIONER

## 2025-07-05 PROCEDURE — 85025 COMPLETE CBC W/AUTO DIFF WBC: CPT

## 2025-07-05 PROCEDURE — 99223 1ST HOSP IP/OBS HIGH 75: CPT | Performed by: PSYCHIATRY & NEUROLOGY

## 2025-07-05 PROCEDURE — 82948 REAGENT STRIP/BLOOD GLUCOSE: CPT

## 2025-07-05 PROCEDURE — 25010000002 FAMOTIDINE 10 MG/ML SOLUTION: Performed by: INTERNAL MEDICINE

## 2025-07-05 PROCEDURE — 86592 SYPHILIS TEST NON-TREP QUAL: CPT | Performed by: INTERNAL MEDICINE

## 2025-07-05 PROCEDURE — 25010000002 LEVETRIRACETAM PER 10 MG: Performed by: PSYCHIATRY & NEUROLOGY

## 2025-07-05 PROCEDURE — 25010000002 HALOPERIDOL LACTATE PER 5 MG: Performed by: PSYCHIATRY & NEUROLOGY

## 2025-07-05 PROCEDURE — 94664 DEMO&/EVAL PT USE INHALER: CPT

## 2025-07-05 PROCEDURE — 94761 N-INVAS EAR/PLS OXIMETRY MLT: CPT

## 2025-07-05 PROCEDURE — 25010000002 POTASSIUM CHLORIDE 10 MEQ/100ML SOLUTION: Performed by: INTERNAL MEDICINE

## 2025-07-05 PROCEDURE — C9254 INJECTION, LACOSAMIDE: HCPCS | Performed by: PSYCHIATRY & NEUROLOGY

## 2025-07-05 PROCEDURE — 82948 REAGENT STRIP/BLOOD GLUCOSE: CPT | Performed by: NURSE PRACTITIONER

## 2025-07-05 PROCEDURE — 94799 UNLISTED PULMONARY SVC/PX: CPT

## 2025-07-05 PROCEDURE — G0432 EIA HIV-1/HIV-2 SCREEN: HCPCS | Performed by: INTERNAL MEDICINE

## 2025-07-05 PROCEDURE — 25010000002 LACOSAMIDE 200 MG/20ML SOLUTION: Performed by: PSYCHIATRY & NEUROLOGY

## 2025-07-05 RX ORDER — POTASSIUM CHLORIDE 1.5 G/1.58G
40 POWDER, FOR SOLUTION ORAL EVERY 4 HOURS
Status: DISCONTINUED | OUTPATIENT
Start: 2025-07-05 | End: 2025-07-05

## 2025-07-05 RX ORDER — FAMOTIDINE 10 MG/ML
20 INJECTION, SOLUTION INTRAVENOUS EVERY 12 HOURS SCHEDULED
Status: DISCONTINUED | OUTPATIENT
Start: 2025-07-05 | End: 2025-07-06

## 2025-07-05 RX ORDER — POTASSIUM CHLORIDE 7.45 MG/ML
10 INJECTION INTRAVENOUS
Status: COMPLETED | OUTPATIENT
Start: 2025-07-05 | End: 2025-07-05

## 2025-07-05 RX ADMIN — BUDESONIDE 0.5 MG: 0.5 SUSPENSION RESPIRATORY (INHALATION) at 07:01

## 2025-07-05 RX ADMIN — POTASSIUM CHLORIDE 10 MEQ: 7.46 INJECTION, SOLUTION INTRAVENOUS at 11:48

## 2025-07-05 RX ADMIN — ENOXAPARIN SODIUM 40 MG: 100 INJECTION SUBCUTANEOUS at 15:58

## 2025-07-05 RX ADMIN — Medication 10 ML: at 21:08

## 2025-07-05 RX ADMIN — Medication 10 ML: at 10:03

## 2025-07-05 RX ADMIN — FAMOTIDINE 20 MG: 10 INJECTION INTRAVENOUS at 21:07

## 2025-07-05 RX ADMIN — MUPIROCIN 1 APPLICATION: 20 OINTMENT TOPICAL at 10:02

## 2025-07-05 RX ADMIN — LACOSAMIDE 50 MG: 10 INJECTION INTRAVENOUS at 21:07

## 2025-07-05 RX ADMIN — POTASSIUM CHLORIDE 10 MEQ: 7.46 INJECTION, SOLUTION INTRAVENOUS at 14:42

## 2025-07-05 RX ADMIN — LEVETIRACETAM 500 MG: 500 INJECTION, SOLUTION INTRAVENOUS at 10:01

## 2025-07-05 RX ADMIN — POTASSIUM CHLORIDE 10 MEQ: 7.46 INJECTION, SOLUTION INTRAVENOUS at 15:58

## 2025-07-05 RX ADMIN — HALOPERIDOL LACTATE 5 MG: 5 INJECTION, SOLUTION INTRAMUSCULAR at 08:15

## 2025-07-05 RX ADMIN — POTASSIUM CHLORIDE 10 MEQ: 7.46 INJECTION, SOLUTION INTRAVENOUS at 12:57

## 2025-07-05 RX ADMIN — HALOPERIDOL LACTATE 5 MG: 5 INJECTION, SOLUTION INTRAMUSCULAR at 02:19

## 2025-07-05 RX ADMIN — BUDESONIDE 0.5 MG: 0.5 SUSPENSION RESPIRATORY (INHALATION) at 18:42

## 2025-07-05 RX ADMIN — LACOSAMIDE 50 MG: 10 INJECTION INTRAVENOUS at 10:01

## 2025-07-05 RX ADMIN — POTASSIUM CHLORIDE 10 MEQ: 7.46 INJECTION, SOLUTION INTRAVENOUS at 17:15

## 2025-07-05 RX ADMIN — LEVETIRACETAM 500 MG: 500 INJECTION, SOLUTION INTRAVENOUS at 21:07

## 2025-07-05 RX ADMIN — FAMOTIDINE 20 MG: 10 INJECTION INTRAVENOUS at 12:57

## 2025-07-05 RX ADMIN — POTASSIUM CHLORIDE 10 MEQ: 7.46 INJECTION, SOLUTION INTRAVENOUS at 10:00
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
## 2025-07-05 NOTE — PROGRESS NOTES
Patient stable  Still nonverbal  Not following commands  Respond to visual threats  No further seizures  We have stopped Depacon and she is on 2 antiepileptics which in future should be tapered off if she is seizure-free    TME/OD/seizures  Neurology team will follow

## 2025-07-05 NOTE — PROGRESS NOTES
Patient Name: Rand Asencio  YOB: 2005  MRN: [id number]  Admission date: 6/30/2025  Reason for Encounter: Follow-up/Progress Note      [facility] Clinical Nutrition Progress Note       Nutrition Intervention Updates: RD to continue to monitor for nutrition plan of care.         Subjective: Progress note to check on TF. Remains extubated.  No feeding tube access.  Psych consulted.          PO Diet: NPO Diet NPO Type: Strict NPO   PO Supplements: None    PO Intake:  NPO       Current nutrition support: Peptamen intense VHP at 65 ml/hr + 10 ml/hr FWF    Nutrition support review: No access        Labs: Phos is elevated - resolved  Hypokalemia - replaced today        GI Function:  No documented BM since admission (x 5 days ago) - no ability to give bowel regimen at this time         Brief Weight Review:    Wt Readings from Last 1 Encounters:   07/04/25 0500 73.6 kg (162 lb 4.1 oz)   07/03/25 0955 78.9 kg (174 lb)   07/03/25 0540 79.3 kg (174 lb 13.2 oz)   06/30/25 0915 78.6 kg (173 lb 4.5 oz)   06/30/25 0545 75.7 kg (166 lb 14.2 oz)   06/30/25 0142 79.8 kg (176 lb)        Results from last 7 days   Lab Units 07/05/25  0544 07/04/25  0311 07/03/25  1727 07/03/25  0352   SODIUM mmol/L 143 142 142 136   POTASSIUM mmol/L 3.1* 3.7 3.5 3.8   CHLORIDE mmol/L 101 96* 98 101   CO2 mmol/L 24.0 26.6 26.7 21.4*   BUN mg/dL 50.5* 24.3* 19.9 14.0   CREATININE mg/dL 0.91 1.05* 0.81 0.75   CALCIUM mg/dL 9.1 9.6 9.2 9.3   BILIRUBIN mg/dL 0.4 0.4  --  0.4   ALK PHOS U/L 67 87  --  56   ALT (SGPT) U/L 46* 70*  --  19   AST (SGOT) U/L 57* 94*  --  34*   GLUCOSE mg/dL 105* 141* 133* 181*     Results from last 7 days   Lab Units 07/05/25  0544 07/04/25  0311 07/03/25  1727 07/03/25  0352 07/02/25  0944   MAGNESIUM mg/dL 2.8* 2.4* 2.1   < >  --    PHOSPHORUS mg/dL 4.5 5.0* 3.4   < >  --    PLATELETS 10*3/mm3 261 337  --    < >  --    HEMOGLOBIN g/dL 12.0 12.7  --    < >  --    HEMATOCRIT % 37.2 39.1  --    < >  --     TRIGLYCERIDES mg/dL  --   --   --   --  104    < > = values in this interval not displayed.     Lab Results   Component Value Date    HGBA1C 5.10 04/25/2025       Electronically signed by:  Mehreen Vazquez RD  07/05/25 11:42 EDT

## 2025-07-05 NOTE — PLAN OF CARE
Goal Outcome Evaluation:           Progress: improving  Outcome Evaluation: Patient on room air with no gtts. She is disoriented x4. She was upgraded to clear liquid diet after tolerating ice chips and water sips. Was able to remove wrist and ankle restraints. Patient still in vest restraints as she is lunging forward and into bed rails and trying to get out of bed. Resting HR in the 60s. During times of agitation HR increases to low 100s. BP stable.

## 2025-07-05 NOTE — CASE MANAGEMENT/SOCIAL WORK
Social Work Assessment  [facility]     Patient Name: Rand Asencio  MRN: [id number]  Today's Date: 7/5/2025    Admit Date: 6/30/2025         Discharge Plan       Row Name 07/05/25 1507       Plan    Plan Comments Per chart review, Pt is extubated but not following commands. Pt is not appropriate for assessment. LSW working on locating legal NOK.      JHONNY Adair, MSW    Phone: [phone]  Fax: [phone]  Email: [email or web address]

## 2025-07-05 NOTE — PROGRESS NOTES
Critical Care Progress Note     Rand Asencio : 2005 MRN:[id number] LOS:5  Rm: 2310/1     Principal Problem: Intentional overdose     Reason for follow up: All the medical problems listed below    Summary      Rand Asencio is a 20 y.o. old female patient with PMH of mood disorder with anxiety and depression, asthma and arthritis who presented to the ED on 2025 for evaluation after an intentional suicide attempt 30 minutes prior to arrival.  The following information has been obtained primarily from review of documentation and collaboration with other providers as the patient is postictal and encephalopathic at the time of examination in the ED.  The patient reported in the ED that she and her partner had gotten into an argument after which she was very upset and ingested her prescribed medications of Lamictal, fluoxetine, and Vraylar with the intention of suicide.  The patient admitted to the ED provider that she has a history of suicide attempts with cutting in the past.  The patient also reported generalized pain, chest pain, and nausea and vomiting.  Poison control was contacted by ED staff and the recommendation was an 8-hour observation at the hospital.  During the patient's time in the ED she had 2 episodes of seizure-like activity which were aborted by IV Ativan.  The patient was admitted and neurology was consulted.  She had at least 2 more witnessed seizure-like activity episodes therefore Keppra and Vimpat were initiated.  After she had several more witnessed episodes of seizure-like activity it was determined that it would benefit her to transfer to the ICU for further medical management and due to the risk of need of airway protection if she develops status epilepticus.  At the time of arrival to the ICU the patient was awake though with a blunted affect.  She was able to answer some questions of orientation however she has no insight into the current situation.  Her airway is self  protected and she is in no acute distress.     Daily progress:  7/1: The patient is intubated and heavily sedated. She required intubation for airway protection yesterday after multiple episodes of seizure-like activity. Not all of her episodes appeared to be true seizures however she did desaturate with some of the events. She also had episodes of decreased LOC, either feigned or due to seizure/post-ictal. No seizures overnight.  Due to the Lamictal overdose, the patient is at risk for Faust-Hilario syndrome.  She currently does not have a rash.  Oral examination is limited due to the presence of oral ET tube however the hard palate seems to be free of any kind of desquamating.  She remains afebrile.  Will start tube feeds as it is unclear how long the patient will need to be intubated.  The half-life of the medications she took have variable rates     7/2: Continues with facial swelling. No current rash visualized. Replace garcia catheter as patient ingested anticholinergic medications preventing her for spontaneous urination, please discuss with MD on this patient before following protocol due to more specific nature or it's placement; required straight cath x 3 overnight, without any spontaneous urination. Still with swelling of face and neck.  Trial of Bumex this morning. More awake, but not following commands; preventing appropriateness for extubation.  No seizure like activity visualized. Tube feeding planned to be initiated today. Labs reviewed and within appropriate parameters.  Reviewed home medications, restarting appropriate medications.     7/3: Facial swelling slightly improved after Bumex.  Looked at patient's weights over past 15 days, considerable weight gain, likely edema.  Starting on Bumex gtt. Checking echocardiogram.  Concern for possible angioedema, though unlikely, giving 2 units of FFP and adding famotidine to previously started steroids. Avoiding benadryl due to added cholinergic effects.   Plan to leave patient's sedation off, and attempt to extubate if patient becomes better at command following.    7/4: Completed steroids, no further evidence of facial or tongue swelling. While trying to wean patient, she was having waxing and waning mentation, placed cerebell on patient that did not alarm. Extubated overnight.  Moving around, intermittently command following, but now that she is extubated, avoiding all sedating medications until patient awakens, likely still having issues with midazolam metabolites. Stopped Bumex gtt, UOP 5120 over past 24 hours with Net IO Since Admission: -3,433 mL [07/05/25 1208]. On tube feeding. Discussed with social work, she is going to work to help establish a legal next of kin as patient is known to have >10 siblings, and she is estranged from her father, and has a difficult relationship with mother. She is going to work on it.      ACP: No ACP documentation on file.    Patient is on Hospital Day: 6.    Significant Events / Subjective     07/05/25 : The patient is on room air with no dyspnea noted and remains hemodynamically stable. She was extubated 7/3 which was tolerated well. She is restless and moves all extremities but not following commands.  She randomly sits up in bed at times. The patient remains encephalopathic and not verbal or conversational at this time. She has had no further seizure-like activity, Depakote discontinued by neurology. Continued on Keppra and Vimpat    Assessment / Plan     Acute respiratory failure with hypoxia / required mechanically assisted ventilation; resolved  Facial swelling, possible angioedema; resolved  Likely due to seizure/post-ictal & possible malingering. Intubated for airway protection and correction of hypoxia on 6/29. Extubated 7/3.  Completed steroids, received 2 units of FFP, and famotidine.    Acute toxic encephalopathy  Polysubstance ingestion  New onset seizure activity, likely attributed to medication ingestion lowering  seizure threshold  CT head negative for any acute intracranial findings  Tox screen positive for THC  Negative HCG.  Neurology following  Keppra loading dose and then every 12 hours  Continue Keppra and Vimpat. Depakote discontinued by neurology 7/5  EEG 6/30/2025: Nothing suggesting clear-cut epileptiform activity.  Supportive care for now.  Intubated for airway protection & hypoxia on 6/29. Extubated 7/3.  Replaced garcia catheter as patient ingested anticholinergic medications preventing her for spontaneous urination  Cerebell was placed on patient on 7/3, no seizure burden indicated   Due to continued encephalopathy, placed back on patient on 7/4. no seizure burden indicated     Weight gain, recent  With the patient's swelling, reviewed patient's weight on 6/17/2025, at this facility, patient weighed 69 kg, on admission patient's weight noted at 78.6, and furthermore as of 7/3 was 78.9 kg.  Responded well to diuresis. Weight down to 73.6.  Stopped diuretics   Monitor I/O's.  Echocardiogram on 6/30, EF 56-60%, normal diastolic and systolic function.    Right upper extremity swelling  Duplex of RUE revealed SVT    Mood disorder with anxiety and depression  Intentional polysubstance ingestion  History of self-destructive behaviors including cutting.  Poison control previously consulted, recommendations received and are in place as appropriate.  Consult behavioral health for behavioral medication management when appropriate.  Sitter at bedside until cleared by psychiatry.     History of asthma  Continue budesonide per home medications.  DuoNebs as needed       Disposition: ICU, due to mental status.    Code status:   Code Status (Patient has no pulse and is not breathing): CPR (Attempt to Resuscitate)  Medical Interventions (Patient has pulse or is breathing): Full Support       Nutrition:   NPO Diet NPO Type: Strict NPO   Tube Feeding: Formula: Peptamen AF (Vital AF 1.2); Feeding Type: Continuous; Start at: Other;  Other: 65; Then Advance By: Do Not Advance; Water Flush: 10 mL; Every: 1 hour; Water Bolus: None     VTE Prophylaxis:  Pharmacologic & mechanical VTE prophylaxis orders are present.    Objective / Physical Exam     Vital signs:  Temp: 98 °F (36.7 °C)  BP: 93/58  Heart Rate: 71  Resp: 14  SpO2: 92 %  Weight: 73.6 kg (162 lb 4.1 oz)    Admission Weight: Weight: 79.8 kg (176 lb)  Current Weight: Weight: 73.6 kg (162 lb 4.1 oz)    Input/Output in last 24 hours:    Intake/Output Summary (Last 24 hours) at 7/5/2025 1208  Last data filed at 7/5/2025 0500  Gross per 24 hour   Intake 0 ml   Output 750 ml   Net -750 ml      Net IO Since Admission: -3,433 mL [07/05/25 1208]     Physical Exam  Vitals and nursing note reviewed.   Constitutional:       General: She is not in acute distress.     Appearance: She is ill-appearing. She is not toxic-appearing.      Comments: Restless/encephalopathic   HENT:      Head: Normocephalic and atraumatic.      Right Ear: External ear normal.      Left Ear: External ear normal.      Nose:      Comments: Left NGT in place.     Mouth/Throat:      Comments: No obvious palatal rash noted.  Eyes:      General: No scleral icterus.     Conjunctiva/sclera: Conjunctivae normal.      Pupils: Pupils are equal, round, and reactive to light.   Cardiovascular:      Heart sounds: Normal heart sounds, S1 normal and S2 normal. No murmur heard.     Comments: Sinus rhythm  Pulmonary:      Breath sounds: No wheezing or rhonchi.      Comments: Diminished bibasilar breath sounds.  Abdominal:      General: There is no distension.      Palpations: Abdomen is soft.      Comments: No grimace to palpation      Musculoskeletal:      Cervical back: Neck supple. No rigidity.      Right lower leg: No edema.      Left lower leg: No edema.      Comments: Mild swelling RUE   Skin:     General: Skin is warm and dry.      Findings: No rash.   Neurological:      Comments: Not following commands, spontaneously moving extremities.  Unable to assess orientation. Not verbalizing or conversational           Radiology and Labs     Results from last 7 days   Lab Units 07/05/25  0544 07/04/25 0311 07/03/25 0352 07/02/25  0354 07/01/25  0320   WBC 10*3/mm3 13.06* 18.70* 11.30* 6.81 7.63   HEMOGLOBIN g/dL 12.0 12.7 11.5* 10.1* 10.2*   HEMATOCRIT % 37.2 39.1 35.3 31.5* 31.9*   PLATELETS 10*3/mm3 261 337 277 235 250           Results from last 7 days   Lab Units 07/05/25 0544 07/04/25 0311 07/03/25  1727 07/03/25 0352 07/02/25  0354 07/01/25  1524 07/01/25  0320   SODIUM mmol/L 143 142 142 136 139  --  138   POTASSIUM mmol/L 3.1* 3.7 3.5 3.8 3.8   < > 3.3*   CHLORIDE mmol/L 101 96* 98 101 108*  --  106   CO2 mmol/L 24.0 26.6 26.7 21.4* 21.0*  --  20.8*   ANION GAP mmol/L 18.0* 19.4* 17.3* 13.6 10.0  --  11.2   BUN mg/dL 50.5* 24.3* 19.9 14.0 8.0  --  5.6*   CREATININE mg/dL 0.91 1.05* 0.81 0.75 0.68  --  0.75   GLUCOSE mg/dL 105* 141* 133* 181* 90  --  82   PHOSPHORUS mg/dL 4.5 5.0* 3.4 4.0 4.1  --  2.9   MAGNESIUM mg/dL 2.8* 2.4* 2.1 1.9 2.2  --  2.1   ALT (SGPT) U/L 46* 70*  --  19 12  --  10   AST (SGOT) U/L 57* 94*  --  34* 26  --  23   ALK PHOS U/L 67 87  --  56 43  --  46    < > = values in this interval not displayed.      Results from last 7 days   Lab Units 07/05/25 0544 07/04/25 0311 07/03/25 0352 07/02/25  0354 07/01/25  0320   ALT (SGPT) U/L 46* 70* 19 12 10   AST (SGOT) U/L 57* 94* 34* 26 23   ALK PHOS U/L 67 87 56 43 46     Results from last 7 days   Lab Units 07/04/25  0101 07/03/25  1638 07/03/25  0333 07/02/25  0343 07/01/25  0249   PH, ARTERIAL pH units 7.484* 7.480* 7.454* 7.408 7.429   PCO2, ARTERIAL mm Hg 45.9 43.6 36.1 40.2 37.4   PO2 ART mm Hg 73.8* 79.0* 97.6 135.4* 206.8*   O2 SATURATION ART % 95.5 96.3 98.0 99.1* 99.8*   FIO2 % 32 30 30 30 40   HCO3 ART mmol/L 34.5* 32.5* 25.3 25.4 24.8   BASE EXCESS ART mmol/L 9.7* 8.1* 1.6 0.6 0.5       No radiology results for the last day      Current medications     Scheduled Meds:    budesonide, 0.5 mg, Nebulization, BID  [Held by provider] Cariprazine HCl, 1.5 mg, Oral, Daily  cetirizine, 10 mg, Nasogastric, Daily  enoxaparin sodium, 40 mg, Subcutaneous, Q24H  famotidine, 20 mg, Intravenous, Q12H  [Held by provider] fluvoxaMINE, 100 mg, Oral, Nightly  insulin lispro, 2-7 Units, Subcutaneous, Q6H  Lacosamide, 50 mg, Intravenous, Q12H  [Held by provider] lamoTRIgine, 100 mg, Oral, Q12H  levETIRAcetam, 500 mg, Intravenous, Q12H  metoprolol tartrate, 5 mg, Intravenous, Q6H  mupirocin, 1 Application, Each Nare, BID  senna-docusate sodium, 2 tablet, Nasogastric, BID   And  polyethylene glycol, 17 g, Nasogastric, Daily  potassium chloride, 10 mEq, Intravenous, Q1H  sodium chloride, 10 mL, Intravenous, Q12H        Continuous Infusions:        Plan discussed with RN. Reviewed all other data in the last 24 hours, including but not limited to vitals, labs, microbiology, imaging and pertinent notes from other providers.     MARIAELENA Tesfaye   Critical Care  07/05/25   12:08 EDT

## 2025-07-06 LAB
AMMONIA BLD-SCNC: 66 UMOL/L (ref 11–51)
ARTERIAL PATENCY WRIST A: POSITIVE
ATMOSPHERIC PRESS: ABNORMAL MM[HG]
BASE EXCESS BLDA CALC-SCNC: 8.2 MMOL/L (ref 0–3)
BDY SITE: ABNORMAL
CO2 BLDA-SCNC: 34.1 MMOL/L (ref 22–29)
GLUCOSE BLDC GLUCOMTR-MCNC: 109 MG/DL (ref 70–105)
GLUCOSE BLDC GLUCOMTR-MCNC: 91 MG/DL (ref 70–105)
GLUCOSE BLDC GLUCOMTR-MCNC: 97 MG/DL (ref 70–105)
GLUCOSE BLDC GLUCOMTR-MCNC: 99 MG/DL (ref 70–105)
HCO3 BLDA-SCNC: 32.8 MMOL/L (ref 21–28)
HEMODILUTION: NO
INHALED O2 CONCENTRATION: 21 %
MODALITY: ABNORMAL
PCO2 BLDA: 43.9 MM HG (ref 35–48)
PH BLDA: 7.48 PH UNITS (ref 7.35–7.45)
PO2 BLD: 425 MM[HG] (ref 0–500)
PO2 BLDA: 89.2 MM HG (ref 83–108)
RPR SER QL: NORMAL
SAO2 % BLDCOA: 97.4 % (ref 94–98)

## 2025-07-06 PROCEDURE — 94799 UNLISTED PULMONARY SVC/PX: CPT

## 2025-07-06 PROCEDURE — 94664 DEMO&/EVAL PT USE INHALER: CPT

## 2025-07-06 PROCEDURE — 36600 WITHDRAWAL OF ARTERIAL BLOOD: CPT | Performed by: NURSE PRACTITIONER

## 2025-07-06 PROCEDURE — 99232 SBSQ HOSP IP/OBS MODERATE 35: CPT

## 2025-07-06 PROCEDURE — 25010000002 FAMOTIDINE 10 MG/ML SOLUTION: Performed by: INTERNAL MEDICINE

## 2025-07-06 PROCEDURE — C9254 INJECTION, LACOSAMIDE: HCPCS | Performed by: PSYCHIATRY & NEUROLOGY

## 2025-07-06 PROCEDURE — 82803 BLOOD GASES ANY COMBINATION: CPT | Performed by: NURSE PRACTITIONER

## 2025-07-06 PROCEDURE — 82948 REAGENT STRIP/BLOOD GLUCOSE: CPT

## 2025-07-06 PROCEDURE — 25010000002 LEVETRIRACETAM PER 10 MG: Performed by: PSYCHIATRY & NEUROLOGY

## 2025-07-06 PROCEDURE — 82140 ASSAY OF AMMONIA: CPT | Performed by: NURSE PRACTITIONER

## 2025-07-06 PROCEDURE — 25010000002 LACOSAMIDE 200 MG/20ML SOLUTION: Performed by: PSYCHIATRY & NEUROLOGY

## 2025-07-06 PROCEDURE — 25010000002 ENOXAPARIN PER 10 MG: Performed by: NURSE PRACTITIONER

## 2025-07-06 PROCEDURE — 94761 N-INVAS EAR/PLS OXIMETRY MLT: CPT

## 2025-07-06 RX ORDER — FAMOTIDINE 20 MG/1
20 TABLET, FILM COATED ORAL
Status: DISCONTINUED | OUTPATIENT
Start: 2025-07-06 | End: 2025-07-08 | Stop reason: HOSPADM

## 2025-07-06 RX ORDER — CETIRIZINE HYDROCHLORIDE 10 MG/1
10 TABLET ORAL DAILY
Status: DISCONTINUED | OUTPATIENT
Start: 2025-07-07 | End: 2025-07-08 | Stop reason: HOSPADM

## 2025-07-06 RX ORDER — LACTULOSE 10 G/15ML
30 SOLUTION ORAL EVERY 8 HOURS
Status: DISCONTINUED | OUTPATIENT
Start: 2025-07-06 | End: 2025-07-08 | Stop reason: HOSPADM

## 2025-07-06 RX ORDER — POLYETHYLENE GLYCOL 3350 17 G/17G
17 POWDER, FOR SOLUTION ORAL DAILY
Status: DISCONTINUED | OUTPATIENT
Start: 2025-07-07 | End: 2025-07-08 | Stop reason: HOSPADM

## 2025-07-06 RX ORDER — AMOXICILLIN 250 MG
2 CAPSULE ORAL 2 TIMES DAILY
Status: DISCONTINUED | OUTPATIENT
Start: 2025-07-06 | End: 2025-07-08 | Stop reason: HOSPADM

## 2025-07-06 RX ORDER — BISACODYL 5 MG/1
5 TABLET, DELAYED RELEASE ORAL DAILY PRN
Status: DISCONTINUED | OUTPATIENT
Start: 2025-07-06 | End: 2025-07-08 | Stop reason: HOSPADM

## 2025-07-06 RX ORDER — LACTULOSE 10 G/15ML
30 SOLUTION ORAL EVERY 6 HOURS
Status: DISCONTINUED | OUTPATIENT
Start: 2025-07-06 | End: 2025-07-06

## 2025-07-06 RX ORDER — BISACODYL 10 MG
10 SUPPOSITORY, RECTAL RECTAL DAILY PRN
Status: DISCONTINUED | OUTPATIENT
Start: 2025-07-06 | End: 2025-07-08 | Stop reason: HOSPADM

## 2025-07-06 RX ADMIN — Medication 10 ML: at 08:21

## 2025-07-06 RX ADMIN — FAMOTIDINE 20 MG: 10 INJECTION INTRAVENOUS at 08:21

## 2025-07-06 RX ADMIN — LEVETIRACETAM 500 MG: 500 INJECTION, SOLUTION INTRAVENOUS at 20:45

## 2025-07-06 RX ADMIN — LACOSAMIDE 50 MG: 10 INJECTION INTRAVENOUS at 20:45

## 2025-07-06 RX ADMIN — LACTULOSE SOLUTION USP, 10 G/15 ML 30 G: 10 SOLUTION ORAL; RECTAL at 20:46

## 2025-07-06 RX ADMIN — ENOXAPARIN SODIUM 40 MG: 100 INJECTION SUBCUTANEOUS at 15:38

## 2025-07-06 RX ADMIN — CETIRIZINE HYDROCHLORIDE 10 MG: 10 TABLET, FILM COATED ORAL at 08:14

## 2025-07-06 RX ADMIN — BUDESONIDE 0.5 MG: 0.5 SUSPENSION RESPIRATORY (INHALATION) at 07:11

## 2025-07-06 RX ADMIN — Medication 10 ML: at 20:46

## 2025-07-06 RX ADMIN — LEVETIRACETAM 500 MG: 500 INJECTION, SOLUTION INTRAVENOUS at 08:30

## 2025-07-06 RX ADMIN — BUDESONIDE 0.5 MG: 0.5 SUSPENSION RESPIRATORY (INHALATION) at 18:23

## 2025-07-06 RX ADMIN — LACOSAMIDE 50 MG: 10 INJECTION INTRAVENOUS at 08:30

## 2025-07-06 RX ADMIN — LACTULOSE SOLUTION USP, 10 G/15 ML 30 G: 10 SOLUTION ORAL; RECTAL at 13:24
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
## 2025-07-06 NOTE — NURSING NOTE
Patient off unit at 1552. Belongings sent with patient. SI precautions. Sitter at bedside sent with patient. Report given to Ferny LYNN but this not RN to get patient. Downgraded to [facility]. Vest still in place to prevent impulsiveness and getting out of bed. In report this RN mentioned that may trial vest off as patient was more alert and oriented. Increased to full liquid diet. Addequate urine output. Ammonia elevated so lactulose given and produced BM. No further complaints from patient at this time.
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
## 2025-07-06 NOTE — PLAN OF CARE
Goal Outcome Evaluation:  Plan of Care Reviewed With: patient        Progress: no change  Outcome Evaluation: Patient transferred to unit this afternoon. sleeping in bed. arouses to voice. very lethargic, and unable to stay awake, unable to assess orientation d/t lethargy. posey vest restraint present. sitter at bedside. password obtained. no complaints.

## 2025-07-06 NOTE — PLAN OF CARE
Problem: Restraint, Nonviolent  Goal: Absence of Harm or Injury  Outcome: Progressing  Intervention: Implement Least Restrictive Safety Strategies  Recent Flowsheet Documentation  Taken 7/6/2025 0200 by Manasa Sy RN  Diversional Activities: television  Taken 7/6/2025 0000 by Manasa Sy RN  Medical Device Protection:   torso covered   tubing secured   IV pole/bag removed from visual field  Diversional Activities: television  Taken 7/5/2025 2200 by Manasa Sy RN  Medical Device Protection:   torso covered   tubing secured   IV pole/bag removed from visual field  Diversional Activities: television  Taken 7/5/2025 2000 by Manasa Sy RN  Diversional Activities: television  Intervention: Protect Dignity, Rights and Personal Wellbeing  Recent Flowsheet Documentation  Taken 7/5/2025 2000 by Manasa Sy RN  Trust Relationship/Rapport:   care explained   choices provided   emotional support provided   questions answered   questions encouraged   reassurance provided   thoughts/feelings acknowledged  Intervention: Protect Skin and Joint Integrity  Recent Flowsheet Documentation  Taken 7/6/2025 0000 by Manasa Sy RN  Body Position: position changed independently  Taken 7/5/2025 2000 by Manasa Sy RN  Skin Protection: incontinence pads utilized     Problem: Fall Injury Risk  Goal: Absence of Fall and Fall-Related Injury  Outcome: Progressing  Intervention: Identify and Manage Contributors  Recent Flowsheet Documentation  Taken 7/6/2025 0000 by Manasa Sy RN  Medication Review/Management: medications reviewed  Taken 7/5/2025 2200 by Manasa Sy RN  Medication Review/Management: medications reviewed  Taken 7/5/2025 2000 by Manasa Sy RN  Medication Review/Management: medications reviewed  Intervention: Promote Injury-Free Environment  Recent Flowsheet Documentation  Taken 7/6/2025 0100 by Manasa Sy RN  Safety Promotion/Fall Prevention:    safety round/check completed   fall prevention program maintained  Taken 7/6/2025 0000 by Manasa Sy RN  Safety Promotion/Fall Prevention:   safety round/check completed   room organization consistent   lighting adjusted   fall prevention program maintained   clutter free environment maintained  Taken 7/5/2025 2300 by Manasa Sy RN  Safety Promotion/Fall Prevention:   safety round/check completed   fall prevention program maintained  Taken 7/5/2025 2200 by Manasa Sy RN  Safety Promotion/Fall Prevention:   safety round/check completed   room organization consistent   lighting adjusted   fall prevention program maintained   clutter free environment maintained  Taken 7/5/2025 2100 by Manasa Sy RN  Safety Promotion/Fall Prevention:   safety round/check completed   fall prevention program maintained  Taken 7/5/2025 2000 by Manasa Sy RN  Safety Promotion/Fall Prevention:   safety round/check completed   room organization consistent   fall prevention program maintained   clutter free environment maintained   assistive device/personal items within reach   activity supervised   lighting adjusted     Problem: Adult Inpatient Plan of Care  Goal: Plan of Care Review  Outcome: Progressing  Flowsheets (Taken 7/6/2025 0413)  Progress: improving  Goal: Patient-Specific Goal (Individualized)  Outcome: Progressing  Goal: Absence of Hospital-Acquired Illness or Injury  Outcome: Progressing  Intervention: Identify and Manage Fall Risk  Recent Flowsheet Documentation  Taken 7/6/2025 0100 by Manasa Sy RN  Safety Promotion/Fall Prevention:   safety round/check completed   fall prevention program maintained  Taken 7/6/2025 0000 by Manasa Sy RN  Safety Promotion/Fall Prevention:   safety round/check completed   room organization consistent   lighting adjusted   fall prevention program maintained   clutter free environment maintained  Taken 7/5/2025 2300 by Manasa Sy  RN  Safety Promotion/Fall Prevention:   safety round/check completed   fall prevention program maintained  Taken 7/5/2025 2200 by Manasa Sy RN  Safety Promotion/Fall Prevention:   safety round/check completed   room organization consistent   lighting adjusted   fall prevention program maintained   clutter free environment maintained  Taken 7/5/2025 2100 by Manasa Sy RN  Safety Promotion/Fall Prevention:   safety round/check completed   fall prevention program maintained  Taken 7/5/2025 2000 by Manasa Sy RN  Safety Promotion/Fall Prevention:   safety round/check completed   room organization consistent   fall prevention program maintained   clutter free environment maintained   assistive device/personal items within reach   activity supervised   lighting adjusted  Intervention: Prevent Skin Injury  Recent Flowsheet Documentation  Taken 7/6/2025 0000 by Manasa Sy RN  Body Position: position changed independently  Taken 7/5/2025 2000 by Manasa Sy RN  Skin Protection: incontinence pads utilized  Intervention: Prevent and Manage VTE (Venous Thromboembolism) Risk  Recent Flowsheet Documentation  Taken 7/6/2025 0000 by Manasa Sy RN  VTE Prevention/Management:   SCDs (sequential compression devices) off   patient refused intervention  Taken 7/5/2025 2000 by Manasa Sy RN  VTE Prevention/Management:   SCDs (sequential compression devices) off   patient refused intervention  Intervention: Prevent Infection  Recent Flowsheet Documentation  Taken 7/6/2025 0000 by Manasa Sy RN  Infection Prevention:   hand hygiene promoted   equipment surfaces disinfected   environmental surveillance performed  Taken 7/5/2025 2200 by Manasa Sy RN  Infection Prevention:   hand hygiene promoted   equipment surfaces disinfected   environmental surveillance performed  Goal: Optimal Comfort and Wellbeing  Outcome: Progressing  Intervention: Provide Person-Centered  Care  Recent Flowsheet Documentation  Taken 7/5/2025 2000 by Manasa Sy RN  Trust Relationship/Rapport:   care explained   choices provided   emotional support provided   questions answered   questions encouraged   reassurance provided   thoughts/feelings acknowledged  Goal: Readiness for Transition of Care  Outcome: Progressing     Problem: Suicide Risk  Goal: Absence of Self-Harm  Outcome: Progressing  Intervention: Assess Risk to Self and Maintain Safety  Recent Flowsheet Documentation  Taken 7/6/2025 0100 by Manasa Sy RN  Enhanced Safety Measures:   bed alarm set   room near unit station    at bedside  Taken 7/6/2025 0000 by Manasa Sy RN  Enhanced Safety Measures:   bed alarm set   room near unit station  Taken 7/5/2025 2300 by Manasa Sy RN  Enhanced Safety Measures:   bed alarm set   room near unit station    at bedside  Taken 7/5/2025 2200 by Manasa Sy RN  Enhanced Safety Measures:   bed alarm set   room near unit station  Taken 7/5/2025 2100 by Manasa Sy RN  Enhanced Safety Measures:   bed alarm set    at bedside  Intervention: Promote Psychosocial Wellbeing  Recent Flowsheet Documentation  Taken 7/6/2025 0000 by Manasa Sy RN  Sleep/Rest Enhancement:   room darkened   relaxation techniques promoted   regular sleep/rest pattern promoted   noise level reduced  Taken 7/5/2025 2000 by Manasa Sy RN  Supportive Measures:   active listening utilized   goal-setting facilitated   positive reinforcement provided   verbalization of feelings encouraged  Family/Support System Care:   involvement promoted   support provided  Sleep/Rest Enhancement:   noise level reduced   regular sleep/rest pattern promoted   relaxation techniques promoted   room darkened   consistent schedule promoted   Goal Outcome Evaluation:      Pt was able to verbalized needs (ice chips, food and to go to the bathroom), speech is  slow paced, however doesn't follow commands. Bladder scanned with only 50ml urine, will bladder scan her again before shift change. Still on restraints, SI sitter at bedside. Continue to monitor.

## 2025-07-06 NOTE — CASE MANAGEMENT/SOCIAL WORK
Social Work Assessment  [facility]     Patient Name: Rand Asencio  MRN: [id number]  Today's Date: 7/6/2025    Admit Date: 6/30/2025     Discharge Plan       Row Name 07/06/25 1207       Plan    Plan Comments Per chart review, Pt is disoriented x 4 and on restraints. Pt is not appropriate for assessment. LSW spoke to Pt's PCP, Aida via [place] regarding legal NOK, but no one has been found. Aida also reported pt has not spoken to her Biological mother in many years and no way of contacting her. At this point in time, Pt's S/O Fan, and his Mother, Steff are able to make medical decsions for Pt until family is found per HB 1119.            JHONNY Adair, MSW    Phone: [phone]  Fax: [phone]  Email: [email or web address]

## 2025-07-06 NOTE — PROGRESS NOTES
Patient Name: Rand Asencio  YOB: 2005  MRN: [id number]  Admission date: 6/30/2025  Reason for Encounter: Follow-up/Progress Note      [facility] Nutrition Progress Note       Nutrition Intervention Updates: RD to continue to monitor for diet advancement         Subjective: Progress note to check on nutrition plan of care.  Diet advanced to CLD.  TF order discontinued per MD.         PO Diet: Diet: Liquid; Clear Liquid; Fluid Consistency: Thin (IDDSI 0)   PO Supplements: None    PO Intake:  Minimal related to CLD       Current nutrition support:    Nutrition support review:        Labs: Reviewed, management per attending        GI Function:  No documented BM since admission (x 6 days ago) - noted patient is refusing bowel regimen per MAR documentation         Brief Weight Review:    Wt Readings from Last 1 Encounters:   07/06/25 0515 74 kg (163 lb 2.3 oz)   07/04/25 0500 73.6 kg (162 lb 4.1 oz)   07/03/25 0955 78.9 kg (174 lb)   07/03/25 0540 79.3 kg (174 lb 13.2 oz)   06/30/25 0915 78.6 kg (173 lb 4.5 oz)   06/30/25 0545 75.7 kg (166 lb 14.2 oz)   06/30/25 0142 79.8 kg (176 lb)        Results from last 7 days   Lab Units 07/05/25 2213 07/05/25 0544 07/04/25  0311   SODIUM mmol/L 142 143 142   POTASSIUM mmol/L 3.7 3.1* 3.7   CHLORIDE mmol/L 101 101 96*   CO2 mmol/L 26.4 24.0 26.6   BUN mg/dL 43.8* 50.5* 24.3*   CREATININE mg/dL 0.79 0.91 1.05*   CALCIUM mg/dL 9.7 9.1 9.6   BILIRUBIN mg/dL 0.4 0.4 0.4   ALK PHOS U/L 63 67 87   ALT (SGPT) U/L 41* 46* 70*   AST (SGOT) U/L 42* 57* 94*   GLUCOSE mg/dL 92 105* 141*     Results from last 7 days   Lab Units 07/05/25 2213 07/05/25  0544 07/04/25  0311 07/03/25  0352 07/02/25  0944   MAGNESIUM mg/dL 2.9* 2.8* 2.4*   < >  --    PHOSPHORUS mg/dL 3.7 4.5 5.0*   < >  --    PLATELETS 10*3/mm3 313 261 337   < >  --    HEMOGLOBIN g/dL 12.2 12.0 12.7   < >  --    HEMATOCRIT % 38.0 37.2 39.1   < >  --    TRIGLYCERIDES mg/dL  --   --   --   --  104     < > = values in this interval not displayed.     Lab Results   Component Value Date    HGBA1C 5.10 04/25/2025       Electronically signed by:  Mehreen Vazquez RD  07/06/25 08:42 EDT

## 2025-07-06 NOTE — PROGRESS NOTES
Pt is more awake today. Answering simple questions. Oriented to person and place. No seizures.     Family friend at  states pt had numerous spells in the days prior to admission that she would describe as pseudoseizures. Pt had shown some mental instability for several days and had agreed to go to [facility] the following morning, but was admitted to the hospital.     Continue and Vimpat for now. Plan to slowly wean off in the future if she remains seizure free.     Will follow peripherally.
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
## 2025-07-07 LAB
ALBUMIN SERPL-MCNC: 4.4 G/DL (ref 3.5–5.2)
ALBUMIN/GLOB SERPL: 1.3 G/DL
ALP SERPL-CCNC: 72 U/L (ref 39–117)
ALT SERPL W P-5'-P-CCNC: 64 U/L (ref 1–33)
ANION GAP SERPL CALCULATED.3IONS-SCNC: 13.2 MMOL/L (ref 5–15)
ANION GAP SERPL CALCULATED.3IONS-SCNC: 16.7 MMOL/L (ref 5–15)
AST SERPL-CCNC: 65 U/L (ref 1–32)
BASOPHILS # BLD AUTO: 0.03 10*3/MM3 (ref 0–0.2)
BASOPHILS NFR BLD AUTO: 0.3 % (ref 0–1.5)
BILIRUB SERPL-MCNC: 0.7 MG/DL (ref 0–1.2)
BUN SERPL-MCNC: 17.5 MG/DL (ref 6–20)
BUN SERPL-MCNC: 21.1 MG/DL (ref 6–20)
BUN/CREAT SERPL: 28.7 (ref 7–25)
BUN/CREAT SERPL: 32 (ref 7–25)
CALCIUM SPEC-SCNC: 9.3 MG/DL (ref 8.6–10.5)
CALCIUM SPEC-SCNC: 9.7 MG/DL (ref 8.6–10.5)
CHLORIDE SERPL-SCNC: 97 MMOL/L (ref 98–107)
CHLORIDE SERPL-SCNC: 98 MMOL/L (ref 98–107)
CO2 SERPL-SCNC: 22.3 MMOL/L (ref 22–29)
CO2 SERPL-SCNC: 24.8 MMOL/L (ref 22–29)
CREAT SERPL-MCNC: 0.61 MG/DL (ref 0.57–1)
CREAT SERPL-MCNC: 0.66 MG/DL (ref 0.57–1)
DEPRECATED RDW RBC AUTO: 39 FL (ref 37–54)
DEVICE COMMENT: NORMAL
EGFRCR SERPLBLD CKD-EPI 2021: 129 ML/MIN/1.73
EGFRCR SERPLBLD CKD-EPI 2021: 131.4 ML/MIN/1.73
EOSINOPHIL # BLD AUTO: 0.05 10*3/MM3 (ref 0–0.4)
EOSINOPHIL NFR BLD AUTO: 0.6 % (ref 0.3–6.2)
ERYTHROCYTE [DISTWIDTH] IN BLOOD BY AUTOMATED COUNT: 12.8 % (ref 12.3–15.4)
GLOBULIN UR ELPH-MCNC: 3.3 GM/DL
GLUCOSE BLDC GLUCOMTR-MCNC: 116 MG/DL (ref 70–105)
GLUCOSE BLDC GLUCOMTR-MCNC: 120 MG/DL (ref 70–105)
GLUCOSE BLDC GLUCOMTR-MCNC: 125 MG/DL (ref 70–105)
GLUCOSE BLDC GLUCOMTR-MCNC: 95 MG/DL (ref 70–105)
GLUCOSE SERPL-MCNC: 86 MG/DL (ref 65–99)
GLUCOSE SERPL-MCNC: 91 MG/DL (ref 65–99)
HCT VFR BLD AUTO: 39 % (ref 34–46.6)
HGB BLD-MCNC: 12.4 G/DL (ref 12–15.9)
IMM GRANULOCYTES # BLD AUTO: 0.06 10*3/MM3 (ref 0–0.05)
IMM GRANULOCYTES NFR BLD AUTO: 0.7 % (ref 0–0.5)
LYMPHOCYTES # BLD AUTO: 2.16 10*3/MM3 (ref 0.7–3.1)
LYMPHOCYTES NFR BLD AUTO: 25 % (ref 19.6–45.3)
MAGNESIUM SERPL-MCNC: 2.6 MG/DL (ref 1.7–2.2)
MCH RBC QN AUTO: 26.7 PG (ref 26.6–33)
MCHC RBC AUTO-ENTMCNC: 31.8 G/DL (ref 31.5–35.7)
MCV RBC AUTO: 83.9 FL (ref 79–97)
MONOCYTES # BLD AUTO: 0.93 10*3/MM3 (ref 0.1–0.9)
MONOCYTES NFR BLD AUTO: 10.8 % (ref 5–12)
NEUTROPHILS NFR BLD AUTO: 5.42 10*3/MM3 (ref 1.7–7)
NEUTROPHILS NFR BLD AUTO: 62.6 % (ref 42.7–76)
NRBC BLD AUTO-RTO: 0 /100 WBC (ref 0–0.2)
PHOSPHATE SERPL-MCNC: 3.7 MG/DL (ref 2.5–4.5)
PLATELET # BLD AUTO: 334 10*3/MM3 (ref 140–450)
PMV BLD AUTO: 9.9 FL (ref 6–12)
POTASSIUM SERPL-SCNC: 3.3 MMOL/L (ref 3.5–5.2)
POTASSIUM SERPL-SCNC: 3.8 MMOL/L (ref 3.5–5.2)
PROT SERPL-MCNC: 7.7 G/DL (ref 6–8.5)
RBC # BLD AUTO: 4.65 10*6/MM3 (ref 3.77–5.28)
SODIUM SERPL-SCNC: 136 MMOL/L (ref 136–145)
SODIUM SERPL-SCNC: 136 MMOL/L (ref 136–145)
WBC NRBC COR # BLD AUTO: 8.65 10*3/MM3 (ref 3.4–10.8)

## 2025-07-07 PROCEDURE — 94664 DEMO&/EVAL PT USE INHALER: CPT

## 2025-07-07 PROCEDURE — 25010000002 ENOXAPARIN PER 10 MG: Performed by: NURSE PRACTITIONER

## 2025-07-07 PROCEDURE — 94799 UNLISTED PULMONARY SVC/PX: CPT

## 2025-07-07 PROCEDURE — 94761 N-INVAS EAR/PLS OXIMETRY MLT: CPT

## 2025-07-07 PROCEDURE — 99232 SBSQ HOSP IP/OBS MODERATE 35: CPT

## 2025-07-07 PROCEDURE — 80053 COMPREHEN METABOLIC PANEL: CPT

## 2025-07-07 PROCEDURE — 84100 ASSAY OF PHOSPHORUS: CPT

## 2025-07-07 PROCEDURE — 85025 COMPLETE CBC W/AUTO DIFF WBC: CPT

## 2025-07-07 PROCEDURE — 82948 REAGENT STRIP/BLOOD GLUCOSE: CPT

## 2025-07-07 PROCEDURE — C9254 INJECTION, LACOSAMIDE: HCPCS | Performed by: PSYCHIATRY & NEUROLOGY

## 2025-07-07 PROCEDURE — 25010000002 LACOSAMIDE 200 MG/20ML SOLUTION: Performed by: PSYCHIATRY & NEUROLOGY

## 2025-07-07 PROCEDURE — 25010000002 LEVETRIRACETAM PER 10 MG: Performed by: PSYCHIATRY & NEUROLOGY

## 2025-07-07 PROCEDURE — 83735 ASSAY OF MAGNESIUM: CPT

## 2025-07-07 RX ORDER — POTASSIUM CHLORIDE 1500 MG/1
40 TABLET, EXTENDED RELEASE ORAL EVERY 4 HOURS
Status: COMPLETED | OUTPATIENT
Start: 2025-07-07 | End: 2025-07-07

## 2025-07-07 RX ADMIN — ENOXAPARIN SODIUM 40 MG: 100 INJECTION SUBCUTANEOUS at 17:13

## 2025-07-07 RX ADMIN — BUDESONIDE 0.5 MG: 0.5 SUSPENSION RESPIRATORY (INHALATION) at 08:13

## 2025-07-07 RX ADMIN — MUPIROCIN 1 APPLICATION: 20 OINTMENT TOPICAL at 09:33

## 2025-07-07 RX ADMIN — SENNOSIDES AND DOCUSATE SODIUM 2 TABLET: 50; 8.6 TABLET ORAL at 22:21

## 2025-07-07 RX ADMIN — FAMOTIDINE 20 MG: 20 TABLET, FILM COATED ORAL at 17:13

## 2025-07-07 RX ADMIN — LACOSAMIDE 50 MG: 10 INJECTION INTRAVENOUS at 09:48

## 2025-07-07 RX ADMIN — POTASSIUM CHLORIDE 40 MEQ: 1500 TABLET, EXTENDED RELEASE ORAL at 09:48

## 2025-07-07 RX ADMIN — LACTULOSE SOLUTION USP, 10 G/15 ML 30 G: 10 SOLUTION ORAL; RECTAL at 05:27

## 2025-07-07 RX ADMIN — LACTULOSE SOLUTION USP, 10 G/15 ML 30 G: 10 SOLUTION ORAL; RECTAL at 22:22

## 2025-07-07 RX ADMIN — LEVETIRACETAM 500 MG: 500 INJECTION, SOLUTION INTRAVENOUS at 09:34

## 2025-07-07 RX ADMIN — POLYETHYLENE GLYCOL 3350 17 G: 17 POWDER, FOR SOLUTION ORAL at 09:33

## 2025-07-07 RX ADMIN — POTASSIUM CHLORIDE 40 MEQ: 1500 TABLET, EXTENDED RELEASE ORAL at 05:27

## 2025-07-07 RX ADMIN — CETIRIZINE HYDROCHLORIDE 10 MG: 10 TABLET, FILM COATED ORAL at 09:34

## 2025-07-07 RX ADMIN — LEVETIRACETAM 500 MG: 500 INJECTION, SOLUTION INTRAVENOUS at 22:22

## 2025-07-07 RX ADMIN — LACTULOSE SOLUTION USP, 10 G/15 ML 30 G: 10 SOLUTION ORAL; RECTAL at 12:14

## 2025-07-07 RX ADMIN — FAMOTIDINE 20 MG: 20 TABLET, FILM COATED ORAL at 09:34

## 2025-07-07 RX ADMIN — BUDESONIDE 0.5 MG: 0.5 SUSPENSION RESPIRATORY (INHALATION) at 19:44

## 2025-07-07 RX ADMIN — LACOSAMIDE 50 MG: 10 INJECTION INTRAVENOUS at 23:59

## 2025-07-07 RX ADMIN — Medication 10 ML: at 09:35

## 2025-07-07 RX ADMIN — SENNOSIDES AND DOCUSATE SODIUM 2 TABLET: 50; 8.6 TABLET ORAL at 09:34

## 2025-07-07 RX ADMIN — Medication 10 ML: at 22:22

## 2025-07-07 RX ADMIN — PHENOL 1 SPRAY: 1.5 LIQUID ORAL at 19:44

## 2025-07-07 NOTE — PLAN OF CARE
Goal Outcome Evaluation:   Pt remains on suicide precautions sitter at bedside.  Pt slept well during the night.  Will continue current plan of care.

## 2025-07-07 NOTE — PROGRESS NOTES
[facility] MEDICINE SERVICE  DAILY PROGRESS NOTE    NAME: Rand Asencio  : 2005  MRN: [id number]      LOS: 7 days     PROVIDER OF SERVICE: Nickolas Tierney MD    Chief Complaint: Intentional overdose    Subjective:   Patient just had a diarrhea  Complaining of sore throat.  Denies any chest pain nausea vomiting abdominal pain  On her last days of menstrual cycle.  H&P, consults, labs and imaging reviewed  Patient denies suicidal ideation  Sitter at the bedside  Interval History:    Patient seen and evaluated at bedside.     Treatment plan discussed with patient. All questions addressed.     Review of Systems: H&P, consults, labs and imaging reviewed.  All 21 ROS were negative except mentioned above.    Objective:     Vital Signs  Temp:  [98.1 °F (36.7 °C)-98.6 °F (37 °C)] 98.6 °F (37 °C)  Heart Rate:  [] 86  Resp:  [12-18] 18  BP: ()/(52-76) 114/76   Body mass index is 23.02 kg/m².    Physical Exam   General: No acute distress, appears stated age  Neuro: Awake and alert, oriented x3, no focal deficits appreciated  Head: Atraumatic, normocephalic  HEENT: EOMI, anicteric, normal sclerae and conjunctivae, moist mucus membranes  Neck: supple, no lymphadenopathy  CV: RRR, soft heart sounds, no murmurs appreciated, no peripheral edema  Pulm: Decreased breath sounds, no increased work of breathing, no adventitious sounds  Abd: Soft, nontender, nondistended  Skin: Warm, dry and intact  Psych:depressed mood and affect    Scheduled Meds   budesonide, 0.5 mg, Nebulization, BID  [Held by provider] Cariprazine HCl, 1.5 mg, Oral, Daily  cetirizine, 10 mg, Oral, Daily  enoxaparin sodium, 40 mg, Subcutaneous, Q24H  famotidine, 20 mg, Oral, BID AC  [Held by provider] fluvoxaMINE, 100 mg, Oral, Nightly  insulin lispro, 2-7 Units, Subcutaneous, Q6H  Lacosamide, 50 mg, Intravenous, Q12H  lactulose, 30 g, Oral, Q8H  [Held by provider] lamoTRIgine, 100 mg, Oral, Q12H  levETIRAcetam, 500 mg, Intravenous,  Q12H  metoprolol tartrate, 5 mg, Intravenous, Q6H  mupirocin, 1 Application, Each Nare, BID  senna-docusate sodium, 2 tablet, Oral, BID   And  polyethylene glycol, 17 g, Oral, Daily  potassium chloride ER, 40 mEq, Oral, Q4H  sodium chloride, 10 mL, Intravenous, Q12H       PRN Meds     senna-docusate sodium **AND** polyethylene glycol **AND** bisacodyl **AND** bisacodyl    Calcium Replacement - Follow Nurse / BPA Driven Protocol    dextrose    dextrose    dextrose    glucagon (human recombinant)    haloperidol lactate    ipratropium-albuterol    Magnesium Standard Dose Replacement - Follow Nurse / BPA Driven Protocol    metoprolol tartrate    nitroglycerin    Phosphorus Replacement - Follow Nurse / BPA Driven Protocol    Potassium Replacement - Follow Nurse / BPA Driven Protocol    sodium chloride    sodium chloride   Infusions         Diagnostic Data    Results from last 7 days   Lab Units 07/07/25  0257   WBC 10*3/mm3 8.65   HEMOGLOBIN g/dL 12.4   HEMATOCRIT % 39.0   PLATELETS 10*3/mm3 334   GLUCOSE mg/dL 86   CREATININE mg/dL 0.66   BUN mg/dL 21.1*   SODIUM mmol/L 136   POTASSIUM mmol/L 3.3*   AST (SGOT) U/L 65*   ALT (SGPT) U/L 64*   ALK PHOS U/L 72   BILIRUBIN mg/dL 0.7   ANION GAP mmol/L 16.7*       No radiology results for the last day    Interval results reviewed.    Assessment/Plan:   Mentation Tylenol overdose with polypharmacy  Suicidal attempt  Hypokalemia  High anion gap  Elevated LFTs  Mood disorder  History of seizure disorder    Psych evaluated the patient recommendations noted  Patient started on Haldol as needed    High anion gap most likely due to intake of antiseizure medicines.  Will continue IV fluids and repeat BMP  Replete potassium    Neurology evaluated the patient and recommended to continue Vimpat and plan to slowly wean off in the future if she remains seizure-free.  Urine tox screen positive for tetrahydrocannabinol  Treatment plan discussed with RN.     VTE Prophylaxis:  Pharmacologic &  mechanical VTE prophylaxis orders are present.         Code status is   Code Status and Medical Interventions: CPR (Attempt to Resuscitate); Full Support   Ordered at: 06/30/25 0350     Code Status (Patient has no pulse and is not breathing):    CPR (Attempt to Resuscitate)     Medical Interventions (Patient has pulse or is breathing):    Full Support       Plan for disposition:     Barriers to Discharge: Elevated anion gap, suicidal  Anticipated Date of Discharge: As per psychiatry  Place of Discharge: Unknown      Time: 40 minutes     Signature: Electronically signed by Nickolas Tierney MD, 07/07/25, 09:27 EDT.  [facility] Hospitalist Team
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
## 2025-07-07 NOTE — PLAN OF CARE
Goal Outcome Evaluation:   Pt VSS. Remains in suicide precautions with sitter at bedside. [place] vest removed. Pt is agreeable to IP psych care once discharged. Will continue current plan of care.

## 2025-07-07 NOTE — CASE MANAGEMENT/SOCIAL WORK
Social Work Assessment  [facility]     Patient Name: Rand Asencio  MRN: [id number]  Today's Date: 7/7/2025    Admit Date: 6/30/2025       Discharge Plan       Row Name 07/07/25 1428       Plan    Plan Voluntary IP psych placement. Acceptance pending.    Patient/Family in Agreement with Plan yes    Plan Comments LSW received secure chat from psych APRN (Natividad HIGH) that patient is agreeable to IP psychiatric treatment and has had success in the past with [facility]. LSW submitted referral to [facility] via [facility], pending response. LSW to update psych APRN with patient's acceptance or denial once able. LSW following for discharge planning.             MERCEDES Preston, W    Phone: [phone]  Fax: [phone]  [email or web address]

## 2025-07-07 NOTE — CASE MANAGEMENT/SOCIAL WORK
Social Work Assessment  [facility]     Patient Name: Rand Asencio  MRN: [id number]  Today's Date: 7/6/2025    Admit Date: 6/30/2025         Discharge Plan       Row Name 07/06/25 2020       Plan    Plan Pending psych recommendations.    Plan Comments LSW received s/c from charge RN that Pt's friend, Steff was present at bedside. Steff left by the time, this writer got to bedside. Pt was asleep. LSW followed up with Steff via telephone. Steff reported that Pt was awake briefly and told her she was terrified and did not want her biological mother to know she was at the hospital or be involved in her care. Steff reported that she discussed ACP with Pt and is agreeable to be her HCR and wanted Steff's mother as secondary contact. Steff reported that she will be going out of town for a few days for one of her younger children's tournament but reachable by phone. Steff is Pt's S/O's mother. Steff is agreeable is Pt still wishes to name her as HCR. LSW updated JHONNY Roberts via s/c for follow up on ACP.        JHONNY Adair, MSW    Phone: [phone]  Fax: [phone]  Email: [email or web address]

## 2025-07-08 VITALS
OXYGEN SATURATION: 100 % | BODY MASS INDEX: 23.13 KG/M2 | DIASTOLIC BLOOD PRESSURE: 78 MMHG | RESPIRATION RATE: 15 BRPM | WEIGHT: 147.4 LBS | TEMPERATURE: 98.6 F | SYSTOLIC BLOOD PRESSURE: 101 MMHG | HEART RATE: 85 BPM | HEIGHT: 67 IN

## 2025-07-08 LAB
ALBUMIN SERPL-MCNC: 4.4 G/DL (ref 3.5–5.2)
ALBUMIN/GLOB SERPL: 1.4 G/DL
ALP SERPL-CCNC: 74 U/L (ref 39–117)
ALT SERPL W P-5'-P-CCNC: 64 U/L (ref 1–33)
ANION GAP SERPL CALCULATED.3IONS-SCNC: 12.7 MMOL/L (ref 5–15)
AST SERPL-CCNC: 43 U/L (ref 1–32)
BASOPHILS # BLD AUTO: 0.02 10*3/MM3 (ref 0–0.2)
BASOPHILS NFR BLD AUTO: 0.2 % (ref 0–1.5)
BILIRUB SERPL-MCNC: 0.5 MG/DL (ref 0–1.2)
BUN SERPL-MCNC: 13 MG/DL (ref 6–20)
BUN/CREAT SERPL: 19.7 (ref 7–25)
CALCIUM SPEC-SCNC: 9.6 MG/DL (ref 8.6–10.5)
CHLORIDE SERPL-SCNC: 101 MMOL/L (ref 98–107)
CO2 SERPL-SCNC: 22.3 MMOL/L (ref 22–29)
CREAT SERPL-MCNC: 0.66 MG/DL (ref 0.57–1)
DEPRECATED RDW RBC AUTO: 37.9 FL (ref 37–54)
EGFRCR SERPLBLD CKD-EPI 2021: 129 ML/MIN/1.73
EOSINOPHIL # BLD AUTO: 0.08 10*3/MM3 (ref 0–0.4)
EOSINOPHIL NFR BLD AUTO: 0.9 % (ref 0.3–6.2)
ERYTHROCYTE [DISTWIDTH] IN BLOOD BY AUTOMATED COUNT: 12.9 % (ref 12.3–15.4)
GLOBULIN UR ELPH-MCNC: 3.2 GM/DL
GLUCOSE SERPL-MCNC: 116 MG/DL (ref 65–99)
HCT VFR BLD AUTO: 37.2 % (ref 34–46.6)
HGB BLD-MCNC: 12.3 G/DL (ref 12–15.9)
IMM GRANULOCYTES # BLD AUTO: 0.07 10*3/MM3 (ref 0–0.05)
IMM GRANULOCYTES NFR BLD AUTO: 0.8 % (ref 0–0.5)
LYMPHOCYTES # BLD AUTO: 1.95 10*3/MM3 (ref 0.7–3.1)
LYMPHOCYTES NFR BLD AUTO: 21.7 % (ref 19.6–45.3)
MAGNESIUM SERPL-MCNC: 2.3 MG/DL (ref 1.7–2.2)
MCH RBC QN AUTO: 27 PG (ref 26.6–33)
MCHC RBC AUTO-ENTMCNC: 33.1 G/DL (ref 31.5–35.7)
MCV RBC AUTO: 81.6 FL (ref 79–97)
MONOCYTES # BLD AUTO: 1.08 10*3/MM3 (ref 0.1–0.9)
MONOCYTES NFR BLD AUTO: 12 % (ref 5–12)
NEUTROPHILS NFR BLD AUTO: 5.79 10*3/MM3 (ref 1.7–7)
NEUTROPHILS NFR BLD AUTO: 64.4 % (ref 42.7–76)
NRBC BLD AUTO-RTO: 0 /100 WBC (ref 0–0.2)
PHOSPHATE SERPL-MCNC: 3.6 MG/DL (ref 2.5–4.5)
PLATELET # BLD AUTO: 368 10*3/MM3 (ref 140–450)
PMV BLD AUTO: 9.7 FL (ref 6–12)
POTASSIUM SERPL-SCNC: 3.9 MMOL/L (ref 3.5–5.2)
PROT SERPL-MCNC: 7.6 G/DL (ref 6–8.5)
RBC # BLD AUTO: 4.56 10*6/MM3 (ref 3.77–5.28)
SODIUM SERPL-SCNC: 136 MMOL/L (ref 136–145)
WBC NRBC COR # BLD AUTO: 8.99 10*3/MM3 (ref 3.4–10.8)

## 2025-07-08 PROCEDURE — 85025 COMPLETE CBC W/AUTO DIFF WBC: CPT

## 2025-07-08 PROCEDURE — 80053 COMPREHEN METABOLIC PANEL: CPT

## 2025-07-08 PROCEDURE — 94799 UNLISTED PULMONARY SVC/PX: CPT

## 2025-07-08 PROCEDURE — 99231 SBSQ HOSP IP/OBS SF/LOW 25: CPT

## 2025-07-08 PROCEDURE — 25010000002 LACOSAMIDE 200 MG/20ML SOLUTION: Performed by: PSYCHIATRY & NEUROLOGY

## 2025-07-08 PROCEDURE — 94664 DEMO&/EVAL PT USE INHALER: CPT

## 2025-07-08 PROCEDURE — 83735 ASSAY OF MAGNESIUM: CPT

## 2025-07-08 PROCEDURE — 94761 N-INVAS EAR/PLS OXIMETRY MLT: CPT

## 2025-07-08 PROCEDURE — 25010000002 LEVETRIRACETAM PER 10 MG: Performed by: PSYCHIATRY & NEUROLOGY

## 2025-07-08 PROCEDURE — 84100 ASSAY OF PHOSPHORUS: CPT

## 2025-07-08 PROCEDURE — C9254 INJECTION, LACOSAMIDE: HCPCS | Performed by: PSYCHIATRY & NEUROLOGY

## 2025-07-08 RX ORDER — LACOSAMIDE 50 MG/1
50 TABLET ORAL EVERY 12 HOURS SCHEDULED
Qty: 60 TABLET | Refills: 0 | Status: SHIPPED | OUTPATIENT
Start: 2025-07-08 | End: 2025-07-08 | Stop reason: HOSPADM

## 2025-07-08 RX ORDER — LEVETIRACETAM 500 MG/1
TABLET ORAL
Qty: 9 TABLET | Refills: 0 | Status: SHIPPED | OUTPATIENT
Start: 2025-07-08 | End: 2025-07-14

## 2025-07-08 RX ORDER — LEVETIRACETAM 500 MG/1
500 TABLET ORAL 2 TIMES DAILY
Qty: 60 TABLET | Refills: 0 | Status: SHIPPED | OUTPATIENT
Start: 2025-07-08 | End: 2025-07-08

## 2025-07-08 RX ADMIN — LEVETIRACETAM 500 MG: 500 INJECTION, SOLUTION INTRAVENOUS at 08:33

## 2025-07-08 RX ADMIN — CETIRIZINE HYDROCHLORIDE 10 MG: 10 TABLET, FILM COATED ORAL at 08:33

## 2025-07-08 RX ADMIN — LACTULOSE SOLUTION USP, 10 G/15 ML 30 G: 10 SOLUTION ORAL; RECTAL at 12:15

## 2025-07-08 RX ADMIN — LACOSAMIDE 50 MG: 10 INJECTION INTRAVENOUS at 12:15

## 2025-07-08 RX ADMIN — BUDESONIDE 0.5 MG: 0.5 SUSPENSION RESPIRATORY (INHALATION) at 08:49

## 2025-07-08 RX ADMIN — MUPIROCIN 1 APPLICATION: 20 OINTMENT TOPICAL at 08:33

## 2025-07-08 RX ADMIN — Medication 10 ML: at 08:33

## 2025-07-08 RX ADMIN — BUDESONIDE 0.5 MG: 0.5 SUSPENSION RESPIRATORY (INHALATION) at 19:26

## 2025-07-08 RX ADMIN — SENNOSIDES AND DOCUSATE SODIUM 2 TABLET: 50; 8.6 TABLET ORAL at 08:33

## 2025-07-08 RX ADMIN — LACTULOSE SOLUTION USP, 10 G/15 ML 30 G: 10 SOLUTION ORAL; RECTAL at 05:07

## 2025-07-08 RX ADMIN — POLYETHYLENE GLYCOL 3350 17 G: 17 POWDER, FOR SOLUTION ORAL at 08:33

## 2025-07-08 RX ADMIN — FAMOTIDINE 20 MG: 20 TABLET, FILM COATED ORAL at 08:33

## 2025-07-08 NOTE — PLAN OF CARE
Problem: Adult Inpatient Plan of Care  Goal: Plan of Care Review  Outcome: Progressing  Goal: Patient-Specific Goal (Individualized)  Outcome: Progressing  Goal: Absence of Hospital-Acquired Illness or Injury  Outcome: Progressing  Intervention: Identify and Manage Fall Risk  Recent Flowsheet Documentation  Taken 7/8/2025 0000 by Mabel Chamberlain RN  Safety Promotion/Fall Prevention: safety round/check completed  Taken 7/7/2025 2200 by Mabel Chamberlain RN  Safety Promotion/Fall Prevention: safety round/check completed  Taken 7/7/2025 2000 by Mabel Chamberlain RN  Safety Promotion/Fall Prevention: safety round/check completed  Intervention: Prevent and Manage VTE (Venous Thromboembolism) Risk  Recent Flowsheet Documentation  Taken 7/7/2025 2000 by Mabel Chamberlain RN  VTE Prevention/Management: compression stockings off  Intervention: Prevent Infection  Recent Flowsheet Documentation  Taken 7/7/2025 2000 by Mabel Chamberlain RN  Infection Prevention: hand hygiene promoted  Goal: Optimal Comfort and Wellbeing  Outcome: Progressing  Goal: Readiness for Transition of Care  Outcome: Progressing     Problem: Suicide Risk  Goal: Absence of Self-Harm  Outcome: Progressing  Intervention: Assess Risk to Self and Maintain Safety  Recent Flowsheet Documentation  Taken 7/7/2025 2000 by Mabel Chamberlain RN  Enhanced Safety Measures:  at bedside     Problem: Mechanical Ventilation Invasive  Goal: Effective Communication  Outcome: Progressing  Goal: Optimal Device Function  Outcome: Progressing  Goal: Mechanical Ventilation Liberation  Outcome: Progressing  Intervention: Promote Extubation and Mechanical Ventilation Liberation  Recent Flowsheet Documentation  Taken 7/7/2025 2000 by Mabel Chamberlain RN  Medication Review/Management: medications reviewed  Goal: Optimal Nutrition Delivery  Outcome: Progressing  Goal: Absence of Device-Related Skin and Tissue Injury  Outcome: Progressing  Goal:  Absence of Ventilator-Induced Lung Injury  Outcome: Progressing     Problem: Restraint, Nonviolent  Goal: Absence of Harm or Injury  Outcome: Progressing  Intervention: Implement Least Restrictive Safety Strategies  Recent Flowsheet Documentation  Taken 7/7/2025 2000 by Mabel Chamberlain RN  Medical Device Protection: IV pole/bag removed from visual field     Problem: Skin Injury Risk Increased  Goal: Skin Health and Integrity  Outcome: Progressing     Problem: Fall Injury Risk  Goal: Absence of Fall and Fall-Related Injury  Outcome: Progressing  Intervention: Identify and Manage Contributors  Recent Flowsheet Documentation  Taken 7/7/2025 2000 by Mabel Chamberlain RN  Medication Review/Management: medications reviewed  Intervention: Promote Injury-Free Environment  Recent Flowsheet Documentation  Taken 7/8/2025 0000 by Mabel Chamberlain RN  Safety Promotion/Fall Prevention: safety round/check completed  Taken 7/7/2025 2200 by Mabel Chamberlain RN  Safety Promotion/Fall Prevention: safety round/check completed  Taken 7/7/2025 2000 by Mabel Chamberlain RN  Safety Promotion/Fall Prevention: safety round/check completed     Problem: Enteral Nutrition  Goal: Absence of Aspiration Signs and Symptoms  Outcome: Progressing  Goal: Safe, Effective Therapy Delivery  Outcome: Progressing  Goal: Feeding Tolerance  Outcome: Progressing   Goal Outcome Evaluation:

## 2025-07-08 NOTE — PLAN OF CARE
Problem: Adult Inpatient Plan of Care  Goal: Plan of Care Review  Outcome: Progressing  Goal: Patient-Specific Goal (Individualized)  Outcome: Progressing  Goal: Absence of Hospital-Acquired Illness or Injury  Outcome: Progressing  Intervention: Identify and Manage Fall Risk  Recent Flowsheet Documentation  Taken 7/8/2025 1400 by Brittany Jesus RN  Safety Promotion/Fall Prevention: safety round/check completed  Taken 7/8/2025 1200 by Brittany Jesus, RN  Safety Promotion/Fall Prevention: safety round/check completed  Goal: Optimal Comfort and Wellbeing  Outcome: Progressing  Goal: Readiness for Transition of Care  Outcome: Progressing     Problem: Suicide Risk  Goal: Absence of Self-Harm  Outcome: Progressing     Problem: Mechanical Ventilation Invasive  Goal: Effective Communication  Outcome: Progressing  Goal: Optimal Device Function  Outcome: Progressing  Goal: Mechanical Ventilation Liberation  Outcome: Progressing  Goal: Optimal Nutrition Delivery  Outcome: Progressing  Goal: Absence of Device-Related Skin and Tissue Injury  Outcome: Progressing  Goal: Absence of Ventilator-Induced Lung Injury  Outcome: Progressing     Problem: Restraint, Nonviolent  Goal: Absence of Harm or Injury  Outcome: Progressing     Problem: Skin Injury Risk Increased  Goal: Skin Health and Integrity  Outcome: Progressing     Problem: Fall Injury Risk  Goal: Absence of Fall and Fall-Related Injury  Outcome: Progressing  Intervention: Promote Injury-Free Environment  Recent Flowsheet Documentation  Taken 7/8/2025 1400 by Brittany Jesus, RN  Safety Promotion/Fall Prevention: safety round/check completed  Taken 7/8/2025 1200 by Brittany Jesus, RN  Safety Promotion/Fall Prevention: safety round/check completed     Problem: Enteral Nutrition  Goal: Absence of Aspiration Signs and Symptoms  Outcome: Progressing  Goal: Safe, Effective Therapy Delivery  Outcome: Progressing  Goal: Feeding Tolerance  Outcome: Progressing   Goal Outcome  herNia surgery on feb 4. Now has scrotal swelling and pain.    Evaluation:

## 2025-07-08 NOTE — PROGRESS NOTES
Patient Name: Rand Asencio  YOB: 2005  MRN: [id number]  Admission date: 6/30/2025  Reason for Encounter: Follow-up/Progress Note      [facility] Clinical Nutrition Progress Note       Nutrition Intervention Updates: Continue current diet and encourage good PO intakes.        Subjective: Progress note to check on nutrition plan of care.  Noted diet order has been FLD x 3 days - unsure reasoning.  RD reached out to MD who advanced diet to regular.  Noted with plans to D/C today to Psychiatric facility.         PO Diet: Diet: Regular/House; Fluid Consistency: Thin (IDDSI 0)   PO Supplements: None    PO Intake:  None documented since diet advancement        Current nutrition support:    Nutrition support review:        Labs: Reviewed, management per attending        GI Function:  Last documented BM 7/7 (yesterday)        Brief Weight Review:    Wt Readings from Last 1 Encounters:   07/08/25 0600 66.9 kg (147 lb 6.4 oz)   07/07/25 0447 66.7 kg (147 lb)   07/06/25 0515 74 kg (163 lb 2.3 oz)   07/04/25 0500 73.6 kg (162 lb 4.1 oz)   07/03/25 0955 78.9 kg (174 lb)   07/03/25 0540 79.3 kg (174 lb 13.2 oz)   06/30/25 0915 78.6 kg (173 lb 4.5 oz)   06/30/25 0545 75.7 kg (166 lb 14.2 oz)   06/30/25 0142 79.8 kg (176 lb)        Results from last 7 days   Lab Units 07/08/25  0631 07/07/25  0959 07/07/25  0257 07/05/25  2213   SODIUM mmol/L 136 136 136 142   POTASSIUM mmol/L 3.9 3.8 3.3* 3.7   CHLORIDE mmol/L 101 98 97* 101   CO2 mmol/L 22.3 24.8 22.3 26.4   BUN mg/dL 13.0 17.5 21.1* 43.8*   CREATININE mg/dL 0.66 0.61 0.66 0.79   CALCIUM mg/dL 9.6 9.7 9.3 9.7   BILIRUBIN mg/dL 0.5  --  0.7 0.4   ALK PHOS U/L 74  --  72 63   ALT (SGPT) U/L 64*  --  64* 41*   AST (SGOT) U/L 43*  --  65* 42*   GLUCOSE mg/dL 116* 91 86 92     Results from last 7 days   Lab Units 07/08/25  0631 07/07/25  0257 07/05/25  2213 07/03/25  0352 07/02/25  0944   MAGNESIUM mg/dL 2.3* 2.6* 2.9*   < >  --    PHOSPHORUS mg/dL 3.6 3.7 3.7   <  >  --    PLATELETS 10*3/mm3 368 334 313   < >  --    HEMOGLOBIN g/dL 12.3 12.4 12.2   < >  --    HEMATOCRIT % 37.2 39.0 38.0   < >  --    TRIGLYCERIDES mg/dL  --   --   --   --  104    < > = values in this interval not displayed.     Lab Results   Component Value Date    HGBA1C 5.10 04/25/2025       Electronically signed by:  Mehreen Vazquez RD  07/08/25 13:38 EDT

## 2025-07-08 NOTE — CASE MANAGEMENT/SOCIAL WORK
"Physicians Statement of Medical Necessity for  Ambulance Transportation    GENERAL INFORMATION     Name: Rand Asencio  YOB: 2005  [facility] PPO:   [id number]     Transport Date: 2025 (Valid for round trips this date, or for scheduled repetitive trips for 60 days from the date signed below.)  Origin: [address] [place] [address].  Destination: [address] [place] [address].  Is the Patient's stay covered under Medicare Part A (PPS/DRG?)No   Closest appropriate facility? Yes  If this a hosp-hosp transfer? Yes, describe services needed at 2nd facility not available at 1st facility Patient in need of acute mental health stabilization due to intentional overdose.  Is this a hospice patient? No    MEDICAL NECESSITY QUESTIONAIRE    Ambulance Transportation is medically necessary only if other means of transportation are contraindicated or would be potentially harmful to the patient.  To meet this requirement, the patient must be either \"bed confined\" or suffer from a condition such that transport by means other than an ambulance is contraindicated by the patient's condition.  The following questions must be answered by the healthcare professional signing below for this form to be valid:     1) Describe the MEDICAL CONDITION (physical and/or mental) of this patient AT THE TIME OF AMBULANCE TRANSPORT that requires the patient to be transported in an ambulance, and why transport by other means is contraindicated by the patient's condition:     Intentional overdose involving multiple medications in a suicide attempt. History of anxiety disorder, suicidal ideation, concern for safety due to history of suicide attempts, bipolar disorder, mood disorder.    Past Medical History:   Diagnosis Date    Allergic 2016    Last allergy test was with  at [facility] in [place] in     Allergies     " "Anxiety 2024    Arthritis 2021    Due to multiple ankle sprains/surgery    Asthma     Depression 2025    Due to suicide attempt    Eating disorder 2019    Due to wrestling    Traumatic closed nondisplaced fracture of distal fibula, right, initial encounter 05/03/2021    Visual impairment 2019    Astigmatism      Past Surgical History:   Procedure Laterality Date    LEG TENDON REPAIR Right 01/24/2022    Procedure: TENDON REPAIR FOOT/TOE -posterior tibial tendon repair, Tarsal Tunnel decompression, biopsy of soft tissue mass;  Surgeon: DAMION Hammer DPM;  Location: [facility] MAIN OR;  Service: Podiatry;  Laterality: Right;    LYMPH NODE BIOPSY Right     neck      2) Is this patient \"bed confined\" as defined below?No    To be \"bed confined\" the patient must satisfy all three of the following criteria:  (1) unable to get up from bed without assistance; AND (2) unable to ambulate;  AND (3) unable to sit in a chair or wheelchair.  3) Can this patient safely be transported by car or wheelchair van (I.e., may safely sit during transport, without an attendant or monitoring?)No   4. In addition to completing questions 1-3 above, please check any of the following conditions that apply*:          *Note: supporting documentation for any boxes checked must be maintained in the patient's medical records Danger to self/other      SIGNATURE OF PHYSICIAN OR OTHER AUTHORIZED HEALTHCARE PROFESSIONAL    I certify that the above information is true and correct based on my evaluation of this patient, and represent that the patient requires transport by ambulance and that other forms of transport are contraindicated.  I understand that this information will be used by the Centers for Medicare and Medicaid Services (CMS) to support the determiniation of medical necessity for ambulance services, and I represent that I have personal knowledge of the patient's condition at the time of transport.       If this box is checked, I also certify " that the patient is physically or mentally incapable of signing the ambulance service's claim form and that the institution with which I am affiliated has furnished care, services or assistance to the patient.  My signature below is made on behalf of the patient pursuant to 42 .36(b)(4). In accordance with 42 .37, the specific reason(s) that the patient is physically or mentally incapable of signing the claim for is as follows:     Signature of Physician or Healthcare Professional   MERCEDES Preston LSW Date/Time:   07/08/2025 1331     (For Scheduled repetitive transport, this form is not valid for transports performed more than 60 days after this date).  MERCEDES Preston LSW                                                                                                                                           --------------------------------------------------------------------------------------------  Printed Name and Credentials of Physician or Authorized Healthcare Professional     *Form must be signed by patient's attending physician for scheduled, repetitive transports,.  For non-repetitive ambulance transports, if unable to obtain the signature of the attending physician, any of the following may sign (please select below):     Physician  Clinical Nurse Specialist  Registered Nurse     Physician Assistant X Discharge Planner  Licensed Practical Nurse     Nurse Practitioner

## 2025-07-08 NOTE — CASE MANAGEMENT/SOCIAL WORK
Social Work Assessment  [facility]     Patient Name: Rand Asencio  MRN: [id number]  Today's Date: 7/8/2025    Admit Date: 6/30/2025       Discharge Plan       Row Name 07/08/25 1403       Plan    Plan Voluntary IP psych placement, [facility] accepted.    Patient/Family in Agreement with Plan yes    Plan Comments LSW called [facility] today (7/8) to inquire about referral sent yesterday (7/7). [facility] stated they didn't have referral and requested it be sent again. LSW sent referral via [facility]. LSW updated care team and asked if pt is agreeable to other IP psych facilities. Psych APRN (Natividad HIGH) stated pt is agreeable to other facilities, just prefers [facility]. LSW sent referrals to [facility], [facility], [facility], and [facility]. LSW received call from [facility] asking for updated labs to be sent over, LSW sent labwork via [facility]. LSW later received call from [facility] asking to speak with RN and pt directly for verbal consent to be obtained. [facility] via phone for information and update. LSW met with pt at bedside, sitter was present, introduced self/role, and reason for visit. Pt provided verbal consent to [facility] for admission, but requested to speak with her Healthcare Representative (Steff - HCR) before officially deciding. Pt stated she was concerned about being on an adult unit, as she has been on a teenager unit previously, and she thought there was another option for her to remain on a teenager unit. LSW explained that due to pt's age, she is only eligible for adult units despite which facility she were to transfer to. Pt spoke with HCR and requested LSW speak with HCR. Utilizing pt's room phone, LSW spoke with HCR about transfer. [facility] and asked LSW about success rates. LSW explained that LSW is unfamiliar with their program due to not having been there or hearing of experiences, but offered to place referral. HCR asked if pt could be  discharged from [facility] to go to another facility if pt has a poor experience. LSW explained that if pt doesn't want to remain at [facility], or any facility, and is not being kept there involuntarily with a court order or other required documents, pt is able to leave and go to another facility as a walk-in for evaluation and placement. HCR verbalized understanding and stated she felt pt would benefit from going to [facility] with the understanding of having the option to go to another facility if needed. HCR and pt spoke again, pt agreeable to go to [facility]. LSW contacted [facility] and provided update about pt being agreeable, [facility] provided accepting information. Accepting provider is Dr. De La Torre and bed number is 205B. LSW updated pt and care team. LSW completed medical necessity form and EMS request form. Pt has no further needs at this time.             MERCEDES Preston, WILMA    Phone: [phone]  Fax: [phone]  [email or web address]
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
## 2025-07-08 NOTE — PROGRESS NOTES
No further seizures    Discontinue Vimpat at discharge  Recommend to continue Keppra with plans to wean off  --Continue Keppra 500mg BID x3 days, then Keppra 500mg daily x3 days, then stop.     Discussed with primary   Follow up with outpt neurology   Seizure precautions   Return to ED if pt starts having seizures again
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
## 2025-07-09 NOTE — CASE MANAGEMENT/SOCIAL WORK
Case Management Discharge Note      Final Note: [facility].         Selected Continued Care - Discharged on 7/8/2025 Admission date: 6/30/2025 - Discharge disposition: Psychiatric Hospital or Unit (DC - External or [facility])      Destination Coordination complete.      Service Provider Services Address Phone Fax Patient Preferred    [facility] [address] [place] [address] [phone] --             Transportation Services  Transportation: Ambulance  Ambulance: [facility]  [facility] Ambulance Status: Accepted    Final Discharge Disposition Code: 65 - psychiatric hospital or unit

## 2025-07-18 ENCOUNTER — OFFICE VISIT (OUTPATIENT)
Dept: FAMILY MEDICINE CLINIC | Facility: CLINIC | Age: 20
End: 2025-07-18
Payer: COMMERCIAL

## 2025-07-18 ENCOUNTER — LAB (OUTPATIENT)
Dept: FAMILY MEDICINE CLINIC | Facility: CLINIC | Age: 20
End: 2025-07-18
Payer: COMMERCIAL

## 2025-07-18 VITALS
DIASTOLIC BLOOD PRESSURE: 57 MMHG | WEIGHT: 155.2 LBS | SYSTOLIC BLOOD PRESSURE: 97 MMHG | TEMPERATURE: 97.2 F | HEART RATE: 78 BPM | BODY MASS INDEX: 24.36 KG/M2 | HEIGHT: 67 IN | RESPIRATION RATE: 16 BRPM | OXYGEN SATURATION: 99 %

## 2025-07-18 DIAGNOSIS — Z00.00 HEALTHCARE MAINTENANCE: Primary | ICD-10-CM

## 2025-07-18 DIAGNOSIS — Z00.00 HEALTHCARE MAINTENANCE: ICD-10-CM

## 2025-07-18 PROBLEM — G40.909 RECURRENT SEIZURES: Status: ACTIVE | Noted: 2025-07-09

## 2025-07-18 PROBLEM — H04.123 DRY EYES: Status: RESOLVED | Noted: 2025-06-18 | Resolved: 2025-07-18

## 2025-07-18 LAB
BILIRUB UR QL STRIP: NEGATIVE
CLARITY UR: CLEAR
COLOR UR: YELLOW
GLUCOSE UR STRIP-MCNC: NEGATIVE MG/DL
HGB UR QL STRIP.AUTO: NEGATIVE
HOLD SPECIMEN: NORMAL
KETONES UR QL STRIP: NEGATIVE
LEUKOCYTE ESTERASE UR QL STRIP.AUTO: NEGATIVE
NITRITE UR QL STRIP: NEGATIVE
PH UR STRIP.AUTO: 7 [PH] (ref 5–8)
PROT UR QL STRIP: NEGATIVE
SP GR UR STRIP: 1.01 (ref 1–1.03)
UROBILINOGEN UR QL STRIP: NORMAL

## 2025-07-18 PROCEDURE — 83036 HEMOGLOBIN GLYCOSYLATED A1C: CPT | Performed by: NURSE PRACTITIONER

## 2025-07-18 PROCEDURE — 80061 LIPID PANEL: CPT | Performed by: NURSE PRACTITIONER

## 2025-07-18 PROCEDURE — 81003 URINALYSIS AUTO W/O SCOPE: CPT | Performed by: NURSE PRACTITIONER

## 2025-07-18 PROCEDURE — 80050 GENERAL HEALTH PANEL: CPT | Performed by: NURSE PRACTITIONER

## 2025-07-18 PROCEDURE — 83540 ASSAY OF IRON: CPT | Performed by: NURSE PRACTITIONER

## 2025-07-18 PROCEDURE — 82728 ASSAY OF FERRITIN: CPT | Performed by: NURSE PRACTITIONER

## 2025-07-18 PROCEDURE — 84466 ASSAY OF TRANSFERRIN: CPT | Performed by: NURSE PRACTITIONER

## 2025-07-18 PROCEDURE — 36415 COLL VENOUS BLD VENIPUNCTURE: CPT

## 2025-07-18 RX ORDER — ERGOCALCIFEROL 1.25 MG/1
50000 CAPSULE, LIQUID FILLED ORAL WEEKLY
Qty: 12 CAPSULE | Refills: 0 | Status: SHIPPED | OUTPATIENT
Start: 2025-07-18 | End: 2025-10-16

## 2025-07-18 RX ORDER — CYCLOSPORINE OPHTHALMIC SOLUTION 1 MG/ML
SOLUTION/ DROPS OPHTHALMIC
COMMUNITY
Start: 2025-06-18

## 2025-07-18 RX ORDER — HYDROXYZINE HYDROCHLORIDE 50 MG/1
50 TABLET, FILM COATED ORAL 4 TIMES DAILY PRN
COMMUNITY
Start: 2025-07-11 | End: 2025-09-09

## 2025-07-18 NOTE — PROGRESS NOTES
Transitional Care Follow Up Visit  Subjective     Rand Asencio is a 20 y.o. female who presents for a transitional care management visit.    Within 48 business hours after discharge our office contacted her via telephone to coordinate her care and needs.      I reviewed and discussed the details of that call along with the discharge summary, hospital problems, inpatient lab results, inpatient diagnostic studies, and consultation reports with Rand.     Current outpatient and discharge medications have been reconciled for the patient.  Reviewed by: MARIAELENA Kauffman          6/6/2025     5:57 PM   Date of TCM Phone Call   [facility]   Date of Admission 6/5/2025   Date of Discharge 6/6/2025   Discharge Disposition Home or Self Care     Risk for Readmission (LACE) Score: 14 (7/8/2025  6:00 AM)      History of Present Illness  19 y/o Rand presents to  office accompanied by her significant other Grandmother, whom she has been living with since her last hospitalization.  She was admitted to [facility] for overdose on 6/30/25    Establishing with new Psychiatrist and Therapist with [facility] - 7/29 and 7/30.     Course During Hospital Stay:  6/30 - 7/8/25 then d/c straight to [facility], had seizure, then sent to [facility] and admission on 7/9/25 - 7/11/25.       The following portions of the patient's history were reviewed and updated as appropriate: allergies, current medications, past family history, past medical history, past social history, past surgical history, and problem list.    Review of Systems   Constitutional:  Positive for fatigue. Negative for activity change, appetite change and fever.   Respiratory:  Negative for shortness of breath.    Cardiovascular:  Negative for chest pain.   Psychiatric/Behavioral:  Negative for suicidal ideas.        Objective   BP 97/57 (BP Location: Left arm, Patient Position: Sitting, Cuff Size: Adult)   Pulse 78   Temp 97.2 °F (36.2 °C) (Infrared)   Resp  "16   Ht 170.2 cm (67\")   Wt 70.4 kg (155 lb 3.2 oz)   SpO2 99%   BMI 24.31 kg/m²   Physical Exam  Constitutional:       General: She is not in acute distress.     Appearance: Normal appearance. She is not ill-appearing, toxic-appearing or diaphoretic.   HENT:      Head: Normocephalic and atraumatic.   Cardiovascular:      Rate and Rhythm: Normal rate and regular rhythm.      Pulses: Normal pulses.      Heart sounds: Normal heart sounds.   Neurological:      General: No focal deficit present.      Mental Status: She is alert and oriented to person, place, and time. Mental status is at baseline.   Psychiatric:         Mood and Affect: Mood normal.         Behavior: Behavior normal.         Thought Content: Thought content normal.         Judgment: Judgment normal.         Assessment & Plan   Problems Addressed this Visit          Health Encounters    Healthcare maintenance - Primary    Relevant Medications    vitamin D (ERGOCALCIFEROL) 1.25 MG (68017 UT) capsule capsule    Other Relevant Orders    CBC & Differential    Comprehensive Metabolic Panel    Hemoglobin A1c    Lipid Panel    TSH Rfx On Abnormal To Free T4    Urinalysis With Culture If Indicated -    Ferritin    Iron Profile w/o Ferritin     Diagnoses         Codes Comments      Healthcare maintenance    -  Primary ICD-10-CM: Z00.00  ICD-9-CM: V70.0                  Current Outpatient Medications on File Prior to Visit   Medication Sig Dispense Refill    albuterol (ACCUNEB) 0.63 MG/3ML nebulizer solution Take 3 mL by nebulization Every 6 (Six) Hours As Needed for Wheezing. 60 mL 0    budesonide (Pulmicort) 0.5 MG/2ML nebulizer solution Take 2 mL by nebulization 2 (Two) Times a Day. Dispense as 1 box of unit doses 2 mL 0    cetirizine (zyrTEC) 10 MG tablet Take 1 tablet by mouth Daily.      cycloSPORINE (Vevye) 0.1 % solution insert 1 drop 2 times every day into both eyes      fluvoxaMINE (LUVOX) 100 MG tablet Take 1 tablet by mouth Every Night.      " hydrOXYzine (ATARAX) 50 MG tablet Take 1 tablet by mouth 4 (Four) Times a Day As Needed for Anxiety.      ipratropium-albuterol (DUO-NEB) 0.5-2.5 mg/3 ml nebulizer Take 3 mL by nebulization Every 4 (Four) Hours As Needed for Wheezing. 360 mL 0    lamoTRIgine (LaMICtal) 100 MG tablet Take 1 tablet by mouth Every 12 (Twelve) Hours.      Tezspire 210 MG/1.91ML solution auto-injector Inject 1.91 mL under the skin into the appropriate area as directed Every 30 (Thirty) Days.      tiotropium bromide-olodaterol (Stiolto Respimat) 2.5-2.5 MCG/ACT aerosol solution inhaler Inhale 1 puff Daily.      Vraylar 1.5 MG capsule capsule Take 1 capsule by mouth Daily.      [DISCONTINUED] hydrOXYzine pamoate (VISTARIL) 50 MG capsule Take 1 capsule by mouth 4 (Four) Times a Day As Needed.      levETIRAcetam (Keppra) 500 MG tablet Take 1 tablet by mouth 2 (Two) Times a Day for 3 days, THEN 1 tablet Daily for 3 days. 9 tablet 0    [DISCONTINUED] fluticasone (FLONASE) 50 MCG/ACT nasal spray Administer 2 sprays into the nostril(s) as directed by provider Daily. (Patient not taking: Reported on 7/18/2025)      [DISCONTINUED] omeprazole (priLOSEC) 40 MG capsule Take 1 capsule by mouth Every Morning. (Patient not taking: Reported on 7/18/2025)      [DISCONTINUED] ondansetron ODT (ZOFRAN-ODT) 4 MG disintegrating tablet Place 1 tablet on the tongue Every 8 (Eight) Hours As Needed for Nausea or Vomiting. (Patient not taking: Reported on 7/18/2025)      [DISCONTINUED] vitamin D (ERGOCALCIFEROL) 1.25 MG (57464 UT) capsule capsule Take 1 capsule by mouth 1 (One) Time Per Week. Sundays. (Patient not taking: Reported on 7/18/2025)       No current facility-administered medications on file prior to visit.

## 2025-07-19 LAB
ALBUMIN SERPL-MCNC: 4.2 G/DL (ref 3.5–5.2)
ALBUMIN/GLOB SERPL: 1.4 G/DL
ALP SERPL-CCNC: 61 U/L (ref 39–117)
ALT SERPL W P-5'-P-CCNC: 21 U/L (ref 1–33)
ANION GAP SERPL CALCULATED.3IONS-SCNC: 12.1 MMOL/L (ref 5–15)
AST SERPL-CCNC: 26 U/L (ref 1–32)
BASOPHILS # BLD AUTO: 0.03 10*3/MM3 (ref 0–0.2)
BASOPHILS NFR BLD AUTO: 0.4 % (ref 0–1.5)
BILIRUB SERPL-MCNC: <0.2 MG/DL (ref 0–1.2)
BUN SERPL-MCNC: 4 MG/DL (ref 6–20)
BUN/CREAT SERPL: 5.3 (ref 7–25)
CALCIUM SPEC-SCNC: 9.1 MG/DL (ref 8.6–10.5)
CHLORIDE SERPL-SCNC: 103 MMOL/L (ref 98–107)
CHOLEST SERPL-MCNC: 147 MG/DL (ref 0–200)
CO2 SERPL-SCNC: 23.9 MMOL/L (ref 22–29)
CREAT SERPL-MCNC: 0.76 MG/DL (ref 0.57–1)
DEPRECATED RDW RBC AUTO: 41.9 FL (ref 37–54)
EGFRCR SERPLBLD CKD-EPI 2021: 115.2 ML/MIN/1.73
EOSINOPHIL # BLD AUTO: 0.06 10*3/MM3 (ref 0–0.4)
EOSINOPHIL NFR BLD AUTO: 0.9 % (ref 0.3–6.2)
ERYTHROCYTE [DISTWIDTH] IN BLOOD BY AUTOMATED COUNT: 13.4 % (ref 12.3–15.4)
FERRITIN SERPL-MCNC: 85.9 NG/ML (ref 13–150)
GLOBULIN UR ELPH-MCNC: 2.9 GM/DL
GLUCOSE SERPL-MCNC: 73 MG/DL (ref 65–99)
HBA1C MFR BLD: 5.1 % (ref 4.8–5.6)
HCT VFR BLD AUTO: 34.2 % (ref 34–46.6)
HDLC SERPL-MCNC: 54 MG/DL (ref 40–60)
HGB BLD-MCNC: 11.4 G/DL (ref 12–15.9)
IMM GRANULOCYTES # BLD AUTO: 0.02 10*3/MM3 (ref 0–0.05)
IMM GRANULOCYTES NFR BLD AUTO: 0.3 % (ref 0–0.5)
IRON 24H UR-MRATE: 34 MCG/DL (ref 37–145)
IRON SATN MFR SERPL: 10 % (ref 20–50)
LDLC SERPL CALC-MCNC: 78 MG/DL (ref 0–100)
LDLC/HDLC SERPL: 1.44 {RATIO}
LYMPHOCYTES # BLD AUTO: 1.93 10*3/MM3 (ref 0.7–3.1)
LYMPHOCYTES NFR BLD AUTO: 27.7 % (ref 19.6–45.3)
MCH RBC QN AUTO: 28.5 PG (ref 26.6–33)
MCHC RBC AUTO-ENTMCNC: 33.3 G/DL (ref 31.5–35.7)
MCV RBC AUTO: 85.5 FL (ref 79–97)
MONOCYTES # BLD AUTO: 0.55 10*3/MM3 (ref 0.1–0.9)
MONOCYTES NFR BLD AUTO: 7.9 % (ref 5–12)
NEUTROPHILS NFR BLD AUTO: 4.37 10*3/MM3 (ref 1.7–7)
NEUTROPHILS NFR BLD AUTO: 62.8 % (ref 42.7–76)
NRBC BLD AUTO-RTO: 0 /100 WBC (ref 0–0.2)
PLATELET # BLD AUTO: 369 10*3/MM3 (ref 140–450)
PMV BLD AUTO: 10.8 FL (ref 6–12)
POTASSIUM SERPL-SCNC: 4.3 MMOL/L (ref 3.5–5.2)
PROT SERPL-MCNC: 7.1 G/DL (ref 6–8.5)
RBC # BLD AUTO: 4 10*6/MM3 (ref 3.77–5.28)
SODIUM SERPL-SCNC: 139 MMOL/L (ref 136–145)
TIBC SERPL-MCNC: 343 MCG/DL (ref 298–536)
TRANSFERRIN SERPL-MCNC: 230 MG/DL (ref 200–360)
TRIGL SERPL-MCNC: 77 MG/DL (ref 0–150)
TSH SERPL DL<=0.05 MIU/L-ACNC: 1.29 UIU/ML (ref 0.27–4.2)
VLDLC SERPL-MCNC: 15 MG/DL (ref 5–40)
WBC NRBC COR # BLD AUTO: 6.96 10*3/MM3 (ref 3.4–10.8)

## 2025-07-20 DIAGNOSIS — D50.8 IRON DEFICIENCY ANEMIA SECONDARY TO INADEQUATE DIETARY IRON INTAKE: Primary | ICD-10-CM

## 2025-07-20 RX ORDER — FERROUS SULFATE 325(65) MG
325 TABLET ORAL EVERY OTHER DAY
Qty: 180 TABLET | Refills: 0 | Status: SHIPPED | OUTPATIENT
Start: 2025-07-20 | End: 2026-07-20

## 2025-07-25 ENCOUNTER — TELEPHONE (OUTPATIENT)
Dept: FAMILY MEDICINE CLINIC | Facility: CLINIC | Age: 20
End: 2025-07-25
Payer: COMMERCIAL

## 2025-07-25 NOTE — TELEPHONE ENCOUNTER
Patient is requesting a note for work from her previous visit stating she will have work restrictions. She would like the note to say something about having the ability to rest her voice as intubation has been difficult to recover from.      Short term disability case opened and she is wondering if we have paperwork the for that?

## 2025-07-29 ENCOUNTER — TELEPHONE (OUTPATIENT)
Dept: FAMILY MEDICINE CLINIC | Facility: CLINIC | Age: 20
End: 2025-07-29
Payer: COMMERCIAL

## 2025-07-29 NOTE — TELEPHONE ENCOUNTER
Patient called regarding short term disability paperwork dropped off earlier this month. This needs to be turned in by August 7th.       Patient has a hospital follow-up scheduled for Monday, 8/4 and asked if it could be ready.

## 2025-07-31 NOTE — TELEPHONE ENCOUNTER
We do have paperwork, as she uploaded via [place]. We will most likely work on with patient present in office as most questions do need patient present for descriptions of what she can and can not do.      Will message pt via [place] to advise.

## 2025-08-04 ENCOUNTER — OFFICE VISIT (OUTPATIENT)
Dept: FAMILY MEDICINE CLINIC | Facility: CLINIC | Age: 20
End: 2025-08-04
Payer: COMMERCIAL

## 2025-08-04 VITALS
RESPIRATION RATE: 16 BRPM | DIASTOLIC BLOOD PRESSURE: 59 MMHG | OXYGEN SATURATION: 99 % | HEIGHT: 67 IN | WEIGHT: 154.1 LBS | BODY MASS INDEX: 24.19 KG/M2 | TEMPERATURE: 98 F | SYSTOLIC BLOOD PRESSURE: 106 MMHG | HEART RATE: 104 BPM

## 2025-08-04 DIAGNOSIS — Z02.89 OTHER GENERAL MEDICAL EXAMINATION FOR ADMINISTRATIVE PURPOSES: ICD-10-CM

## 2025-08-04 DIAGNOSIS — Z00.00 HEALTHCARE MAINTENANCE: ICD-10-CM

## 2025-08-04 DIAGNOSIS — D50.9 IRON DEFICIENCY ANEMIA, UNSPECIFIED IRON DEFICIENCY ANEMIA TYPE: Primary | ICD-10-CM

## 2025-08-04 PROCEDURE — 99395 PREV VISIT EST AGE 18-39: CPT | Performed by: NURSE PRACTITIONER

## 2025-08-04 PROCEDURE — 99214 OFFICE O/P EST MOD 30 MIN: CPT | Performed by: NURSE PRACTITIONER

## 2025-08-04 RX ORDER — DIVALPROEX SODIUM 500 MG/1
500 TABLET, FILM COATED, EXTENDED RELEASE ORAL DAILY
COMMUNITY
Start: 2025-07-28

## (undated) DEVICE — BNDG ESMARK 4IN 12FT LF STRL BLU

## (undated) DEVICE — PK EXTREM 50

## (undated) DEVICE — CUFF TOURNI 1BLADDER 1PRT 30IN STRL

## (undated) DEVICE — UNDERGLV SURG BIOGEL INDICAT PI SZ8 BLU

## (undated) DEVICE — PENCL HND ROCKRSWTCH HOLSTR EZ CLEAN TP CRD 10FT

## (undated) DEVICE — SOL IRRIG NACL 1000ML

## (undated) DEVICE — GOWN,REINFORCE,POLY,SIRUS,BREATH SLV,XLG: Brand: MEDLINE

## (undated) DEVICE — GLV SURG BIOGEL M LTX PF 7 1/2

## (undated) DEVICE — KT SURG TURNOVER 050

## (undated) DEVICE — BANDAGE,GAUZE,BULKEE II,4.5"X4.1YD,STRL: Brand: MEDLINE